# Patient Record
Sex: FEMALE | Race: BLACK OR AFRICAN AMERICAN | Employment: FULL TIME | URBAN - METROPOLITAN AREA
[De-identification: names, ages, dates, MRNs, and addresses within clinical notes are randomized per-mention and may not be internally consistent; named-entity substitution may affect disease eponyms.]

---

## 2017-01-05 ENCOUNTER — OFFICE VISIT (OUTPATIENT)
Dept: FAMILY MEDICINE CLINIC | Age: 32
End: 2017-01-05

## 2017-01-05 VITALS
RESPIRATION RATE: 16 BRPM | HEART RATE: 77 BPM | TEMPERATURE: 98.5 F | HEIGHT: 64 IN | DIASTOLIC BLOOD PRESSURE: 77 MMHG | OXYGEN SATURATION: 100 % | WEIGHT: 142 LBS | SYSTOLIC BLOOD PRESSURE: 115 MMHG | BODY MASS INDEX: 24.24 KG/M2

## 2017-01-05 DIAGNOSIS — J06.9 URI, ACUTE: Primary | ICD-10-CM

## 2017-01-05 RX ORDER — LORATADINE 10 MG/1
10 TABLET ORAL
COMMUNITY
End: 2018-04-27 | Stop reason: SDUPTHER

## 2017-01-05 RX ORDER — FLUTICASONE PROPIONATE 50 MCG
2 SPRAY, SUSPENSION (ML) NASAL DAILY
Qty: 1 BOTTLE | Refills: 0 | Status: SHIPPED | OUTPATIENT
Start: 2017-01-05 | End: 2018-04-27 | Stop reason: ALTCHOICE

## 2017-01-05 RX ORDER — PSEUDOEPHEDRINE HCL 30 MG
30 TABLET ORAL
Qty: 30 TAB | Refills: 0 | Status: SHIPPED | OUTPATIENT
Start: 2017-01-05 | End: 2018-05-21

## 2017-01-05 RX ORDER — AZITHROMYCIN 250 MG/1
TABLET, FILM COATED ORAL
Qty: 6 TAB | Refills: 0 | Status: SHIPPED | OUTPATIENT
Start: 2017-01-05 | End: 2017-01-10

## 2017-01-05 NOTE — MR AVS SNAPSHOT
Visit Information Date & Time Provider Department Dept. Phone Encounter #  
 1/5/2017  9:30 AM Marcelina Herron, Luis Christie Bono [phone] [id number] Follow-up Instructions Return for CPE. Upcoming Health Maintenance Date Due DTaP/Tdap/Td series (1 - Tdap) 9/2/2004 PAP AKA CERVICAL CYTOLOGY 10/18/2014 INFLUENZA AGE 9 TO ADULT 8/1/2016 Allergies as of 1/5/2017  Review Complete On: 1/5/2017 By: Marcelina Herron MD  
 No Known Allergies Current Immunizations  Never Reviewed No immunizations on file. Not reviewed this visit You Were Diagnosed With   
  
 Codes Comments URI, acute    -  Primary ICD-10-CM: J06.9 ICD-9-CM: 465.9 Vitals BP Pulse Temp Resp Height(growth percentile) Weight(growth percentile) 115/77 77 98.5 °F (36.9 °C) (Oral) 16 5' 4\" (1.626 m) 142 lb (64.4 kg) SpO2 BMI OB Status Smoking Status 100% 24.37 kg/m2 Having regular periods Never Smoker BMI and BSA Data Body Mass Index Body Surface Area  
 24.37 kg/m 2 1.71 m 2 Preferred Pharmacy Pharmacy Name Phone [facility] [address] [place] [address] [phone] Your Updated Medication List  
  
   
This list is accurate as of: 1/5/17  9:59 AM.  Always use your most recent med list.  
  
  
  
  
 azithromycin 250 mg tablet Commonly known as:  Kesha Burnett Take 2 tablets today, then take 1 tablet daily CLARITIN 10 mg tablet Generic drug:  loratadine Take 10 mg by mouth. fluticasone 50 mcg/actuation nasal spray Commonly known as:  [place] 2 Sprays by Both Nostrils route daily. pseudoephedrine 30 mg tablet Commonly known as:  SUDAFED Take 1 Tab by mouth every six (6) hours as needed for Congestion. Prescriptions Sent to Pharmacy Refills  
 azithromycin (ZITHROMAX) 250 mg tablet 0 Sig: Take 2 tablets today, then take 1 tablet daily Class: Normal  
 Pharmacy: [address] [place] [address] Ph #: [phone]  
 fluticasone (FLONASE) 50 mcg/actuation nasal spray 0 Si Sprays by Both Nostrils route daily. Class: Normal  
 Pharmacy: [address] [place] [address] Ph #: [phone] Route: Both Nostrils  
 pseudoephedrine (SUDAFED) 30 mg tablet 0 Sig: Take 1 Tab by mouth every six (6) hours as needed for Congestion. Class: Normal  
 Pharmacy: [address] [place] [address] Ph #: [phone] Route: Oral  
  
Follow-up Instructions Return for CPE. Patient Instructions - Mucinex as needed for expectoration - Sudafed as needed for congestion - Zyrtec as needed for congestion 
- rest 
- plenty of fluids 
- follow up if not feeling better in 7 days or if symptoms get worse 
- can start the zpak if you are not feeling better in 7 days or get wors Upper Respiratory Infection (Cold): Care Instructions Your Care Instructions An upper respiratory infection, or URI, is an infection of the nose, sinuses, or throat. URIs are spread by coughs, sneezes, and direct contact. The common cold is the most frequent kind of URI. The flu and sinus infections are other kinds of URIs. Almost all URIs are caused by viruses. Antibiotics won't cure them. But you can treat most infections with home care. This may include drinking lots of fluids and taking over-the-counter pain medicine. You will probably feel better in 4 to 10 days. The doctor has checked you carefully, but problems can develop later. If you notice any problems or new symptoms, get medical treatment right away. Follow-up care is a key part of your treatment and safety. Be sure to make and go to all appointments, and call your doctor if you are having problems. It's also a good idea to know your test results and keep a list of the medicines you take. How can you care for yourself at home? · To prevent dehydration, drink plenty of fluids, enough so that your urine is light yellow or clear like water. Choose water and other caffeine-free clear liquids until you feel better. If you have kidney, heart, or liver disease and have to limit fluids, talk with your doctor before you increase the amount of fluids you drink. · Take an over-the-counter pain medicine, such as acetaminophen (Tylenol), ibuprofen (Advil, Motrin), or naproxen (Aleve). Read and follow all instructions on the label. · Before you use cough and cold medicines, check the label. These medicines may not be safe for young children or for people with certain health problems. · Be careful when taking over-the-counter cold or flu medicines and Tylenol at the same time. Many of these medicines have acetaminophen, which is Tylenol. Read the labels to make sure that you are not taking more than the recommended dose. Too much acetaminophen (Tylenol) can be harmful. · Get plenty of rest. 
· Do not smoke or allow others to smoke around you. If you need help quitting, talk to your doctor about stop-smoking programs and medicines. These can increase your chances of quitting for good. When should you call for help? Call 911 anytime you think you may need emergency care. For example, call if: 
· You have severe trouble breathing. Call your doctor now or seek immediate medical care if: 
· You seem to be getting much sicker. · You have new or worse trouble breathing. · You have a new or higher fever. · You have a new rash. Watch closely for changes in your health, and be sure to contact your doctor if: 
· You have a new symptom, such as a sore throat, an earache, or sinus pain. · You cough more deeply or more often, especially if you notice more mucus or a change in the color of your mucus. · You do not get better as expected. Where can you learn more? Go to http://roland-agueda.info/. Enter T017 in the search box to learn more about \"Upper Respiratory Infection (Cold): Care Instructions. \" Current as of: June 30, 2016 Content Version: 11.1 © 0430-7188 Healthwise, Incorporated. Care instructions adapted under license by [employer] (which disclaims liability or warranty for this information). If you have questions about a medical condition or this instruction, always ask your healthcare professional. [place] [address] any warranty or liability for your use of this information. Introducing [facility]! [facility] introduces Root Orange patient portal. Now you can access parts of your medical record, email your doctor's office, and request medication refills online. 1. In your internet browser, go to [email or web address]. I Just [email or web address] Mind 2. Click on the First Time User? Click Here link in the Sign In box. You will see the New Member Sign Up page. 3. Enter your Root Orange Access Code exactly as it appears below. You will not need to use this code after youve completed the sign-up process. If you do not sign up before the expiration date, you must request a new code. · Root Orange Access Code: [email or web address] Expires: 4/5/2017  9:59 AM 
 
4. Enter the last four digits of your Social Security Number (xxxx) and Date of Birth (mm/dd/yyyy) as indicated and click Submit. You will be taken to the next sign-up page. 5. Create a OmniLyticst ID. This will be your Root Orange login ID and cannot be changed, so think of one that is secure and easy to remember. 6. Create a Root Orange password. You can change your password at any time. 7. Enter your Password Reset Question and Answer. This can be used at a later time if you forget your password. 8. Enter your e-mail address. You will receive e-mail notification when new information is available in [address] 9. Click Sign Up. You can now view and download portions of your medical record. 10. Click the Download Summary menu link to download a portable copy of your medical information. If you have questions, please visit the Frequently Asked Questions section of the [employer] website. Remember, [employer] is NOT to be used for urgent needs. For medical emergencies, dial 911. Now available from your iPhone and Android! Please provide this summary of care documentation to your next provider. Your primary care clinician is listed as Jim Li. If you have any questions after today's visit, please call [phone].

## 2017-01-05 NOTE — LETTER
NOTIFICATION RETURN TO WORK / SCHOOL 
 
1/5/2017 9:55 AM 
 
Ms. Aurelio Baugh 3[facility] [address] [place] [id number] To Whom It May Concern: 
 
Aurelio Baugh is currently under the care of [facility]. She was seen in our office 1/5/17. If there are questions or concerns please have the patient contact our office. Sincerely, Marcelina Herron MD

## 2017-01-05 NOTE — PROGRESS NOTES
Humberto Wright is a 33 y.o. female  Chief Complaint   Patient presents with    Head Pain    Neck Pain    Cold Symptoms     cough and sinus pain starting in early december 1. Have you been to the ER, urgent care clinic since your last visit? Hospitalized since your last visit? No    2. Have you seen or consulted any other health care providers outside of the [address] [place] [address] since your last visit? Include any pap smears or colon screening.   No

## 2017-01-05 NOTE — PATIENT INSTRUCTIONS
- Mucinex as needed for expectoration  - Sudafed as needed for congestion  - Zyrtec as needed for congestion  - rest  - plenty of fluids  - follow up if not feeling better in 7 days or if symptoms get worse  - can start the zpak if you are not feeling better in 7 days or get wors         Upper Respiratory Infection (Cold): Care Instructions  Your Care Instructions    An upper respiratory infection, or URI, is an infection of the nose, sinuses, or throat. URIs are spread by coughs, sneezes, and direct contact. The common cold is the most frequent kind of URI. The flu and sinus infections are other kinds of URIs. Almost all URIs are caused by viruses. Antibiotics won't cure them. But you can treat most infections with home care. This may include drinking lots of fluids and taking over-the-counter pain medicine. You will probably feel better in 4 to 10 days. The doctor has checked you carefully, but problems can develop later. If you notice any problems or new symptoms, get medical treatment right away. Follow-up care is a key part of your treatment and safety. Be sure to make and go to all appointments, and call your doctor if you are having problems. It's also a good idea to know your test results and keep a list of the medicines you take. How can you care for yourself at home? · To prevent dehydration, drink plenty of fluids, enough so that your urine is light yellow or clear like water. Choose water and other caffeine-free clear liquids until you feel better. If you have kidney, heart, or liver disease and have to limit fluids, talk with your doctor before you increase the amount of fluids you drink. · Take an over-the-counter pain medicine, such as acetaminophen (Tylenol), ibuprofen (Advil, Motrin), or naproxen (Aleve). Read and follow all instructions on the label. · Before you use cough and cold medicines, check the label.  These medicines may not be safe for young children or for people with certain health problems. · Be careful when taking over-the-counter cold or flu medicines and Tylenol at the same time. Many of these medicines have acetaminophen, which is Tylenol. Read the labels to make sure that you are not taking more than the recommended dose. Too much acetaminophen (Tylenol) can be harmful. · Get plenty of rest.  · Do not smoke or allow others to smoke around you. If you need help quitting, talk to your doctor about stop-smoking programs and medicines. These can increase your chances of quitting for good. When should you call for help? Call 911 anytime you think you may need emergency care. For example, call if:  · You have severe trouble breathing. Call your doctor now or seek immediate medical care if:  · You seem to be getting much sicker. · You have new or worse trouble breathing. · You have a new or higher fever. · You have a new rash. Watch closely for changes in your health, and be sure to contact your doctor if:  · You have a new symptom, such as a sore throat, an earache, or sinus pain. · You cough more deeply or more often, especially if you notice more mucus or a change in the color of your mucus. · You do not get better as expected. Where can you learn more? Go to http://roland-agueda.info/. Enter U505 in the search box to learn more about \"Upper Respiratory Infection (Cold): Care Instructions. \"  Current as of: June 30, 2016  Content Version: 11.1  © 7192-9518 [employer]. Care instructions adapted under license by [employer] (which disclaims liability or warranty for this information). If you have questions about a medical condition or this instruction, always ask your healthcare professional. [place] [address] any warranty or liability for your use of this information.

## 2017-01-05 NOTE — PROGRESS NOTES
Janet Espinosa is a 33 y.o. female who presents with congestion. History provided by: patient     HPI    Beginning of December started to cough with clear to yellow sputum. Having postnasal drip. Was starting to get better and then starting Monday now feeling malaise, fatigue and body pain including neck pain and headache. Positive ear pain  Occasional itchy water eyes  No sore throat, congestion, fever, chills, SOB, wheezing    No current sick contacts  Non smoker  Positive seasonal allergies  No hx asthma or needing inhaler      There is no problem list on file for this patient. Current Outpatient Prescriptions:     loratadine (CLARITIN) 10 mg tablet, Take 10 mg by mouth., Disp: , Rfl:     azithromycin (ZITHROMAX) 250 mg tablet, Take 2 tablets today, then take 1 tablet daily, Disp: 6 Tab, Rfl: 0    fluticasone (FLONASE) 50 mcg/actuation nasal spray, 2 Sprays by Both Nostrils route daily. , Disp: 1 Bottle, Rfl: 0    pseudoephedrine (SUDAFED) 30 mg tablet, Take 1 Tab by mouth every six (6) hours as needed for Congestion. , Disp: 30 Tab, Rfl: 0     No Known Allergies     History reviewed. No pertinent past medical history. ROS  As stated in HPI    Physical Exam   Constitutional: She is well-developed, well-nourished, and in no distress. /77  Pulse 77  Temp 98.5 °F (36.9 °C) (Oral)   Resp 16  Ht 5' 4\" (1.626 m)  Wt 142 lb (64.4 kg)  SpO2 100%  BMI 24.37 kg/m2    HENT:   Head: Normocephalic and atraumatic. Right Ear: External ear normal.   Left Ear: External ear normal.   Mouth/Throat: Oropharynx is clear and moist. No oropharyngeal exudate. Bilateral TM with good light reflex  Positive bilateral maxillary sinus tenderness  Positive nasal congestion   Eyes: Conjunctivae are normal. Pupils are equal, round, and reactive to light. Right eye exhibits no discharge. Left eye exhibits no discharge. No scleral icterus. Neck: Normal range of motion. Neck supple.    Cardiovascular: Normal rate, regular rhythm, normal heart sounds and intact distal pulses. Exam reveals no gallop and no friction rub. No murmur heard. Pulmonary/Chest: Effort normal and breath sounds normal. No respiratory distress. She has no wheezes. She has no rales. Lymphadenopathy:     She has no cervical adenopathy. Vitals reviewed. Negative Brudzinski sign and Kerning sign    Assessment/Plan:   Sandie Flores is a 33 y.o. female who presents with congestion. ICD-10-CM ICD-9-CM    1. URI, acute J06.9 465.9 azithromycin (ZITHROMAX) 250 mg tablet      fluticasone (FLONASE) 50 mcg/actuation nasal spray      pseudoephedrine (SUDAFED) 30 mg tablet     1. URI, acute  Appears that patient had acute new symptoms onset. Symptoms flu like. Outside the window for antiviral. Give symptomatic treatment. - azithromycin (ZITHROMAX) 250 mg tablet; Take 2 tablets today, then take 1 tablet daily  Dispense: 6 Tab; Refill: 0 if not better in 7 days  - fluticasone (FLONASE) 50 mcg/actuation nasal spray; 2 Sprays by Both Nostrils route daily. Dispense: 1 Bottle; Refill: 0  - pseudoephedrine (SUDAFED) 30 mg tablet; Take 1 Tab by mouth every six (6) hours as needed for Congestion. Dispense: 30 Tab; Refill: 0  - continue claritin  - tylenol or ibuprofen prn    Follow-up Disposition:  Return for CPE. I have discussed the diagnosis with the patient and the intended plan as seen in the above orders. The patient has received an after-visit summary and questions were answered concerning future plans. I have discussed medication side effects and warnings with the patient as well.     Scarlet Foreman MD  Family Medicine Resident (PGY-3)  1/5/2017

## 2017-01-06 ENCOUNTER — TELEPHONE (OUTPATIENT)
Dept: FAMILY MEDICINE CLINIC | Age: 32
End: 2017-01-06

## 2017-01-06 NOTE — TELEPHONE ENCOUNTER
----- Message from Ap Matos sent at 1/6/2017 10:56 AM EST -----  Regarding: /Telephone    Pt was out of work on yesterday and seen at the office, barrera a doctor's note stating returned date  Faxed to [phone] . Please call the pt to advise.  [phone]

## 2017-01-06 NOTE — LETTER
NOTIFICATION RETURN TO WORK / SCHOOL 
 
1/6/2017 11:53 AM 
 
Ms. Sandie Flores [facility] [address] [place] [address] [address] [id number] To Whom It May Concern: 
 
Sandie Flores is currently under the care of [facility]. She was seen in our office 1/5/17 and will return to work/school on: 1/7/17 If there are questions or concerns please have the patient contact our office. Sincerely, Scarlet Foreman MD

## 2017-01-13 ENCOUNTER — TELEPHONE (OUTPATIENT)
Dept: FAMILY MEDICINE | Age: 32
End: 2017-01-13

## 2017-02-14 ENCOUNTER — OFFICE VISIT (OUTPATIENT)
Dept: CHIROPRACTIC MEDICINE | Age: 32
End: 2017-02-14

## 2017-02-14 VITALS
OXYGEN SATURATION: 99 % | WEIGHT: 144.2 LBS | BODY MASS INDEX: 24.75 KG/M2 | HEART RATE: 70 BPM | SYSTOLIC BLOOD PRESSURE: 90 MMHG | TEMPERATURE: 97 F | DIASTOLIC BLOOD PRESSURE: 60 MMHG | RESPIRATION RATE: 24 BRPM

## 2017-02-14 DIAGNOSIS — M99.04 SOMATIC DYSFUNCTION OF SACRAL REGION: Primary | ICD-10-CM

## 2017-02-14 PROCEDURE — 99213 OFFICE O/P EST LOW 20 MIN: CPT | Performed by: FAMILY MEDICINE

## 2017-02-14 PROCEDURE — 98925 OSTEOPATH MANJ 1-2 REGIONS: CPT | Performed by: FAMILY MEDICINE

## 2017-03-08 ENCOUNTER — TELEPHONE (OUTPATIENT)
Dept: FAMILY MEDICINE | Age: 32
End: 2017-03-08

## 2017-03-08 DIAGNOSIS — L70.0 ACNE VULGARIS: Primary | Chronic | ICD-10-CM

## 2017-03-08 RX ORDER — MINOCYCLINE HYDROCHLORIDE 65 MG/1
1 TABLET, FILM COATED, EXTENDED RELEASE ORAL DAILY
Qty: 90 TABLET | Refills: 1 | Status: SHIPPED | OUTPATIENT
Start: 2017-03-08 | End: 2017-07-27 | Stop reason: CLARIF

## 2017-05-15 ENCOUNTER — OFFICE VISIT (OUTPATIENT)
Dept: FAMILY MEDICINE | Age: 32
End: 2017-05-15

## 2017-05-15 VITALS
OXYGEN SATURATION: 94 % | RESPIRATION RATE: 16 BRPM | BODY MASS INDEX: 24.79 KG/M2 | TEMPERATURE: 97.4 F | DIASTOLIC BLOOD PRESSURE: 60 MMHG | HEART RATE: 74 BPM | SYSTOLIC BLOOD PRESSURE: 84 MMHG | WEIGHT: 144.4 LBS

## 2017-05-15 DIAGNOSIS — K62.5 RECTAL BLEEDING: Primary | ICD-10-CM

## 2017-05-15 PROCEDURE — 99213 OFFICE O/P EST LOW 20 MIN: CPT | Performed by: FAMILY MEDICINE

## 2017-05-22 ENCOUNTER — TELEPHONE (OUTPATIENT)
Dept: FAMILY MEDICINE | Age: 32
End: 2017-05-22

## 2017-05-22 ENCOUNTER — NURSE ONLY (OUTPATIENT)
Dept: FAMILY MEDICINE | Age: 32
End: 2017-05-22

## 2017-05-22 DIAGNOSIS — Z11.1 SCREENING FOR TUBERCULOSIS: Primary | ICD-10-CM

## 2017-05-22 PROCEDURE — 86580 TB INTRADERMAL TEST: CPT | Performed by: FAMILY MEDICINE

## 2017-05-25 ENCOUNTER — TELEPHONE (OUTPATIENT)
Dept: FAMILY MEDICINE | Age: 32
End: 2017-05-25

## 2017-05-25 ENCOUNTER — NURSE ONLY (OUTPATIENT)
Dept: FAMILY MEDICINE | Age: 32
End: 2017-05-25

## 2017-05-25 LAB — INDURATION: 0 MM (ref 0–?)

## 2017-06-16 ENCOUNTER — TELEPHONE (OUTPATIENT)
Dept: FAMILY MEDICINE | Age: 32
End: 2017-06-16

## 2017-06-19 ENCOUNTER — TELEPHONE (OUTPATIENT)
Dept: FAMILY MEDICINE | Age: 32
End: 2017-06-19

## 2017-07-03 ENCOUNTER — OFFICE VISIT (OUTPATIENT)
Dept: FAMILY MEDICINE CLINIC | Age: 32
End: 2017-07-03

## 2017-07-03 VITALS
HEART RATE: 67 BPM | TEMPERATURE: 98.4 F | HEIGHT: 64 IN | DIASTOLIC BLOOD PRESSURE: 73 MMHG | BODY MASS INDEX: 24.07 KG/M2 | SYSTOLIC BLOOD PRESSURE: 106 MMHG | OXYGEN SATURATION: 100 % | RESPIRATION RATE: 17 BRPM | WEIGHT: 141 LBS

## 2017-07-03 DIAGNOSIS — Z01.419 WELL WOMAN EXAM WITH ROUTINE GYNECOLOGICAL EXAM: ICD-10-CM

## 2017-07-03 DIAGNOSIS — N92.6 MISSED MENSES: Primary | ICD-10-CM

## 2017-07-03 LAB
HCG URINE, QL. (POC): POSITIVE
VALID INTERNAL CONTROL?: YES

## 2017-07-03 NOTE — PROGRESS NOTES
HPI:  Shawn Win is a 33 y.o. female presenting for well woman exam and possible pregnancy. No acute problems today. Diet: She follows regular diet and tries to eat healthy. Drinks plenty of fluids. Exercise: Just walking   Tobacco: None  Alcohol: None  Drugs: None    Health Maintenance:  Pap smear: Last , normal  Mammogram: Never done before   HIV testing: Never done  Hepatitis C testing: Never done  Dental screening: Seeing the dentist regularly    Immunizations: There is no immunization history on file for this patient. Flu:Not done for 5586-6423 season  Tdap: More than 10 years ago    Menstrual, Pregnancy, and Sexual Histories:  Age at which menses began: 13  Last menstrual period was May, 28, 2017  Length of periods: 4-5 days  Number of days between periods: 28-30 days  Menstrual flow: Normal        Sexually active?: yes  Number of sexual partners: 1  Type of sexual partners: Male  Method of family planning: None    Has 6year old daughter who was born 6 years ago at [facility]. No complications during the pregnancy,  full term w/o complications. No Known Allergies      Current Outpatient Prescriptions   Medication Sig    prenatal vit-calcium-iron-fa (PRENATAL PLUS WITH CALCIUM) 27 mg iron- 1 mg tab Take 1 Tab by mouth daily.  loratadine (CLARITIN) 10 mg tablet Take 10 mg by mouth.  fluticasone (FLONASE) 50 mcg/actuation nasal spray 2 Sprays by Both Nostrils route daily.  pseudoephedrine (SUDAFED) 30 mg tablet Take 1 Tab by mouth every six (6) hours as needed for Congestion. No current facility-administered medications for this visit. History reviewed. No pertinent past medical history. History reviewed. No pertinent surgical history.       Family History   Problem Relation Age of Onset    Cancer Mother      breast    Cancer Father      prostate         Social History     Social History    Marital status: UNKNOWN     Spouse name: N/A    Number of children: N/A    Years of education: N/A     Occupational History    Not on file. Social History Main Topics    Smoking status: Never Smoker    Smokeless tobacco: Never Used    Alcohol use No    Drug use: No    Sexual activity: Not Currently     Partners: Male     Other Topics Concern    Not on file     Social History Narrative         Review of Systems: (Positive for items in bold)  Constitutional: fevers, chills, fatigue, weakness, weight loss, weight gain  Eyes: blurry vision, decreased vision, loss of vision, eye pain, diplopia, photophobia, discharge  ENT: sore throat, nasal congestion, nasal discharge, epistaxis, tinnitus, hearing loss  Cardiovascular: chest pain, dyspnea on exertion, orthopnea, paroxysmal nocturnal dyspnea, edema, palpitations   Respiratory: cough, hemoptysis, shortness of breath, pleuritic chest pain, wheezing   GI: abdominal pain, flank pain, nausea, vomiting, diarrhea, constipation, black stool, blood in stool  : dysuria, frequency/urgency, hematuria, genital discharge, vaginal bleeding. Missed menses.   Endocrine: polydipsia, polyuria, palpitations, skin changes, temperature intolerances, unexpected weight change   Neurological: dizzy/vertigo, headache, focal weakness, numbness/tingling, speech problems, loss of consciousness, confusion, memory loss  Musculoskeletal: back pain, joint pain, joint stiffness, joint swelling, muscle pain, muscle weakness  Skin: rash, itching, hives  Breast: no masses or nipple discharge  Psychiatric: anxiety, depression, physical abuse      Physical Exam  Visit Vitals    /73 (BP 1 Location: Left arm, BP Patient Position: Sitting)    Pulse 67    Temp 98.4 °F (36.9 °C) (Oral)    Resp 17    Ht 5' 4\" (1.626 m)    Wt 141 lb (64 kg)    LMP 05/28/2017    SpO2 100%    BMI 24.2 kg/m2       General appearance - alert, well appearing, and in no distress  HEENT - normocephalic, pupils equal and reactive, extraocular eye movements intact, hearing grossly normal bilaterally, bilateral TM's and external ear canals normal, nose normal and patent with no erythema, mucous membranes moist, pharynx normal without lesions  Neck - supple, no significant adenopathy  Cardiac - normal rate and regular rhythm, no m/r/g  Respiratory - clear to auscultation, no wheezes, rales or rhonchi, symmetric air entry  Abdomen - soft, nontender, nondistended, no masses or organomegaly  Neurologic - alert, oriented, normal speech, no focal findings or movement disorder noted, cranial nerves II through XII intact  Musculoskeletal - no joint tenderness, deformity or swelling  Extremities - peripheral pulses normal, no pedal edema, no clubbing or cyanosis  Skin - normal coloration and turgor, no rashes, no suspicious skin lesions noted  Pelvic - Exam chaperoned by Louisa Ramos LPN. External genitalia normal without rashes or lesions. Pink and moist vaginal mucosa. No discharge. Cervix without lesions or abnormal discharge. Uterus non tender and normal size. No adnexal masses or tenderness appreciated. Breast - Exam chaperoned by Louisa Ramos LPN. Non tender. No masses. No nipple discharge. POC UPT positive. Assessment/Plan:    1. Missed menses. Discussed with the patient healthy life style during the pregnancy. Precausions were given, safe exercise discussed.  -The labwork as below  -Will start prenatal vitamins  -The appointment is made for the dating [address] on 7/11/2017  And for initial prenatal visit with me on 7/21/2017.    2. Well woman exam. No concerns about health maintenance. I have discussed the diagnosis with the patient and the intended plan as seen in the above orders. The patient has received an after-visit summary and questions were answered concerning future plans. I have discussed medication side effects and warnings with the patient as well. Informed pt to return to the office if new symptoms arise.     Patient was discussed  with Dr. Reyna Alford, Attending Physician. Flavio Alston MD  Family Medicine Resident, PGY-2        Encounter Diagnoses:    ICD-10-CM ICD-9-CM    1. Missed menses N92.6 626.4 AMB POC URINE PREGNANCY TEST, VISUAL COLOR COMPARISON      CBC WITH AUTOMATED DIFF      METABOLIC PANEL, COMPREHENSIVE      TOTAL HCG, QT.      ABO GROUPING AND RHO(D) TYPING      HEP B SURFACE AG      HIV 1/2 AG/AB, 4TH GENERATION,W RFLX CONFIRM      VZV AB, IGG      T PALLIDUM AB      RUBELLA AB, IGG      CULTURE, URINE      prenatal vit-calcium-iron-fa (PRENATAL PLUS WITH CALCIUM) 27 mg iron- 1 mg tab      CHLAMYDIA / GC-AMPLIFIED      CANCELED: CHLAMYDIA/GC AMPLIFICATON THINPREP      CANCELED: CHLAMYDIA/GC PCR   2.  Well woman exam with routine gynecological exam Z01.419 V72.31 PAP IG, APTIMA HPV AND RFX 16/18,45 ([id number])

## 2017-07-03 NOTE — PATIENT INSTRUCTIONS
Learning About Pregnancy  Your Care Instructions  Your health in the early weeks of your pregnancy is particularly important for your babys health. Take good care of yourself. Anything you do that harms your body can also harm your baby. Make sure to go to all of your doctor appointments. Regular checkups will help keep you and your baby healthy. Follow-up care is a key part of your treatment and safety. Be sure to make and go to all appointments, and call your doctor if you are having problems. Its also a good idea to know your test results and keep a list of the medicines you take. How can you care for yourself at home? Diet  · Eat a balanced diet. Make sure your diet includes plenty of beans, peas, and leafy green vegetables. · Do not skip meals or go for many hours without eating. If you are nauseated, try to eat a small, healthy snack every 2 to 3 hours. · Do not eat fish that has a high level of mercury, such as shark, swordfish, or mackerel. Do not eat more than one can of tuna each week. · Drink plenty of fluids, enough so that your urine is light yellow or clear like water. If you have kidney, heart, or liver disease and have to limit fluids, talk with your doctor before you increase the amount of fluids you drink. · Cut down on caffeine, such as coffee, tea, and cola. · Do not drink alcohol, such as beer, wine, or hard liquor. · Take a multivitamin that contains at least 400 micrograms (mcg) of folic acid to help prevent birth defects. Fortified cereal and whole wheat bread are good additional sources of folic acid. · Increase the calcium in your diet. Try to drink a quart of skim milk each day. You may also take calcium supplements and choose foods such as cheese and yogurt. Lifestyle  · Make sure you go to your follow-up appointments. · Get plenty of rest. You may be unusually tired while you are pregnant. · Get at least 30 minutes of exercise on most days of the week.  Walking is a good choice. If you have not exercised in the past, start out slowly. Take several short walks each day. · Do not smoke. If you need help quitting, talk to your doctor about stop-smoking programs. These can increase your chances of quitting for good. · Do not touch cat feces or litter boxes. Also, wash your hands after you handle raw meat, and fully cook all meat before you eat it. Wear gloves when you work in the yard or garden, and wash your hands well when you are done. Cat feces, raw or undercooked meat, and contaminated dirt can cause an infection that may harm your baby or lead to a miscarriage. · Do not use saunas or hot tubs. Raising your body temperature may harm your baby. · Avoid chemical fumes, paint fumes, or poisons. · Do not use illegal drugs or alcohol. Medicines  · Review all of your medicines with your doctor. Some of your routine medicines may need to be changed to protect your baby. · Use acetaminophen (Tylenol) to relieve minor problems, such as a mild headache or backache or a mild fever with cold symptoms. Do not use nonsteroidal anti-inflammatory drugs (NSAIDs), such as ibuprofen (Advil, Motrin) or naproxen (Aleve), unless your doctor says it is okay. · Do not take two or more pain medicines at the same time unless the doctor told you to. Many pain medicines have acetaminophen, which is Tylenol. Too much acetaminophen (Tylenol) can be harmful. · Take your medicines exactly as prescribed. Call your doctor if you think you are having a problem with your medicine. To manage morning sickness  · If you feel sick when you first wake up, try eating a small snack (such as crackers) before you get out of bed. Allow some time to digest the snack, and then get out of bed slowly. · Do not skip meals or go for long periods without eating. An empty stomach can make nausea worse. · Eat small, frequent meals instead of three large meals each day. · Drink plenty of fluids.  Sports drinks, such as Gatorade or Powerade, are good choices. · Eat foods that are high in protein but low in fat. · If you are taking iron supplements, ask your doctor if they are necessary. Iron can make nausea worse. · Avoid any smells, such as coffee, that make you feel sick. · Get lots of rest. Morning sickness may be worse when you are tired. Where can you learn more? Go to http://roland-agueda.info/. Enter S693 in the search box to learn more about \"Learning About Pregnancy. \"  Current as of: March 16, 2017  Content Version: 11.3  © 1109-4128 [employer]. Care instructions adapted under license by [employer] (which disclaims liability or warranty for this information). If you have questions about a medical condition or this instruction, always ask your healthcare professional. [address] any warranty or liability for your use of this information. Exercise During Pregnancy: Care Instructions  Your Care Instructions  Exercise is good for healthy pregnant women. It can relieve back pain, swelling, and other discomforts of pregnancy. It also prepares your muscles for childbirth. And exercise can improve your energy level and help you sleep better. If your doctor recommends it, get more exercise. Walking is a good choice. Bit by bit, increase the amount you walk every day. Try for at least 30 minutes on most days of the week. But if you do not already exercise, be sure to talk with your doctor before you start a new exercise program. Try exercise classes for pregnant women. Doctors do not recommend contact sports during pregnancy. Follow-up care is a key part of your treatment and safety. Be sure to make and go to all appointments, and call your doctor if you are having problems. It's also a good idea to know your test results and keep a list of the medicines you take. How can you care for yourself at home?   · Talk with your doctor about the right kind of exercise for each stage of pregnancy. · Listen to your body to know if your exercise is at a safe level. ¨ Do not become overheated while you exercise. ¨ If you feel tired, take it easy. You might walk instead of run. ¨ If you are used to strenuous exercise, pay attention to changes in your body that mean it is time to slow down. ¨ High body temperature can be harmful to your baby. So if you want to use a sauna or hot tub, be sure to talk to your doctor about how to use them safely. · If you exercised before getting pregnant, you should be able to keep up your routine early in your pregnancy. That might include running and aerobics. Later, you may want to switch to swimming or walking. · Eat a small snack or drink juice 15 to 30 minutes before you exercise. · Eat a healthy diet. Make sure it includes plenty of beans, peas, and leafy green vegetables. You may need to increase how much you eat to get extra energy for exercise. · Drink plenty of fluids before, during, and after exercise. · Avoid contact sports, such as soccer and basketball. Also avoid scuba diving, exercise in high altitude (above 6,000 feet), and horseback riding. · Do not get overtired while you exercise. You should be able to talk while you work out. · After your fourth month of pregnancy, avoid exercises (such as sit-ups and some yoga poses) that require you to lie flat on your back on a hard surface. · Try swimming and brisk walking during all your pregnancy. · Get plenty of rest. You may be very tired while you are pregnant. Where can you learn more? Go to http://roland-agueda.info/. Enter X808 in the search box to learn more about \"Exercise During Pregnancy: Care Instructions. \"  Current as of: March 16, 2017  Content Version: 11.3  © 6055-5066 [employer]. Care instructions adapted under license by [employer] (which disclaims liability or warranty for this information).  If you have questions about a medical condition or this instruction, always ask your healthcare professional. [place] [address] any warranty or liability for your use of this information.

## 2017-07-03 NOTE — MR AVS SNAPSHOT
Visit Information Date & Time Provider Department Dept. Phone Encounter #  
 7/3/2017  4:00 PM Fernando Berger MD [facility] [phone] [id number] Your Appointments 7/11/2017  4:00 PM  
PROCEDURE with Werner Brown MD  
[address]) Appt Note: dating ultrasound [address] [facility]  
[phone]  
  
   
 [address] [id number]  
  
    
 7/21/2017 11:05 AM  
NEW OB Patient with Fernando Berger MD  
[address]) Appt Note: New Ob  
 [address] [facility] [facility]  
[phone]  
  
   
 [address] [facility] [phone] Upcoming Health Maintenance Date Due DTaP/Tdap/Td series (1 - Tdap) 9/2/2004 PAP AKA CERVICAL CYTOLOGY 10/18/2014 INFLUENZA AGE 9 TO ADULT 8/1/2017 Allergies as of 7/3/2017  Review Complete On: 7/3/2017 By: Kindra Segura LPN No Known Allergies Current Immunizations  Never Reviewed No immunizations on file. Not reviewed this visit You Were Diagnosed With   
  
 Codes Comments Missed menses    -  Primary ICD-10-CM: N92.6 ICD-9-CM: 626.4 Vitals BP Pulse Temp Resp Height(growth percentile) Weight(growth percentile) 106/73 (BP 1 Location: Left arm, BP Patient Position: Sitting) 67 98.4 °F (36.9 °C) (Oral) 17 5' 4\" (1.626 m) 141 lb (64 kg) LMP SpO2 BMI OB Status Smoking Status 05/28/2017 100% 24.2 kg/m2 Pregnant Never Smoker Vitals History BMI and BSA Data Body Mass Index Body Surface Area  
 24.2 kg/m 2 1.7 m 2 Preferred Pharmacy Pharmacy Name Phone [facility] [address] [place] [address] [phone] Your Updated Medication List  
  
   
This list is accurate as of: 7/3/17  5:17 PM.  Always use your most recent med list.  
  
  
  
  
 CLARITIN 10 mg tablet Generic drug:  loratadine Take 10 mg by mouth. fluticasone 50 mcg/actuation nasal spray Commonly known as:  Marcine Infield 2 Sprays by Both Nostrils route daily. prenatal vit-calcium-iron-fa 27 mg iron- 1 mg Tab Commonly known as:  PRENATAL PLUS with CALCIUM Take 1 Tab by mouth daily. pseudoephedrine 30 mg tablet Commonly known as:  SUDAFED Take 1 Tab by mouth every six (6) hours as needed for Congestion. Prescriptions Printed Refills  
 prenatal vit-calcium-iron-fa (PRENATAL PLUS WITH CALCIUM) 27 mg iron- 1 mg tab 11 Sig: Take 1 Tab by mouth daily. Class: Print Route: Oral  
  
We Performed the Following ABO GROUPING AND RHO(D) TYPING [id number] CPT(R)] AMB POC URINE PREGNANCY TEST, VISUAL COLOR COMPARISON [05608 CPT(R)] CBC WITH AUTOMATED DIFF [30811 CPT(R)] CHLAMYDIA/GC PCR [00629 CPT(R)] CULTURE, URINE [id number] CPT(R)] HEP B SURFACE AG [id number] CPT(R)] HIV 1/2 AG/AB, 4TH GENERATION,W RFLX CONFIRM [[id number] Custom] METABOLIC PANEL, COMPREHENSIVE [51822 CPT(R)] PAP IG, APTIMA HPV AND RFX 16/18,45 (229623) [[id number] Custom] RUBELLA AB, IGG [id number] CPT(R)] T PALLIDUM AB [[id number] Custom] TOTAL HCG, QT. [31792 CPT(R)] VZV AB, IGG [id number] CPT(R)] Patient Instructions Learning About Pregnancy Your Care Instructions Your health in the early weeks of your pregnancy is particularly important for your babys health. Take good care of yourself. Anything you do that harms your body can also harm your baby. Make sure to go to all of your doctor appointments. Regular checkups will help keep you and your baby healthy. Follow-up care is a key part of your treatment and safety. Be sure to make and go to all appointments, and call your doctor if you are having problems. Its also a good idea to know your test results and keep a list of the medicines you take. How can you care for yourself at home? Diet · Eat a balanced diet. Make sure your diet includes plenty of beans, peas, and leafy green vegetables. · Do not skip meals or go for many hours without eating. If you are nauseated, try to eat a small, healthy snack every 2 to 3 hours. · Do not eat fish that has a high level of mercury, such as shark, swordfish, or mackerel. Do not eat more than one can of tuna each week. · Drink plenty of fluids, enough so that your urine is light yellow or clear like water. If you have kidney, heart, or liver disease and have to limit fluids, talk with your doctor before you increase the amount of fluids you drink. · Cut down on caffeine, such as coffee, tea, and cola. · Do not drink alcohol, such as beer, wine, or hard liquor. · Take a multivitamin that contains at least 400 micrograms (mcg) of folic acid to help prevent birth defects. Fortified cereal and whole wheat bread are good additional sources of folic acid. · Increase the calcium in your diet. Try to drink a quart of skim milk each day. You may also take calcium supplements and choose foods such as cheese and yogurt. Lifestyle · Make sure you go to your follow-up appointments. · Get plenty of rest. You may be unusually tired while you are pregnant. · Get at least 30 minutes of exercise on most days of the week. Walking is a good choice. If you have not exercised in the past, start out slowly. Take several short walks each day. · Do not smoke. If you need help quitting, talk to your doctor about stop-smoking programs. These can increase your chances of quitting for good. · Do not touch cat feces or litter boxes. Also, wash your hands after you handle raw meat, and fully cook all meat before you eat it. Wear gloves when you work in the yard or garden, and wash your hands well when you are done. Cat feces, raw or undercooked meat, and contaminated dirt can cause an infection that may harm your baby or lead to a miscarriage. · Do not use saunas or hot tubs. Raising your body temperature may harm your baby. · Avoid chemical fumes, paint fumes, or poisons. · Do not use illegal drugs or alcohol. Medicines · Review all of your medicines with your doctor. Some of your routine medicines may need to be changed to protect your baby. · Use acetaminophen (Tylenol) to relieve minor problems, such as a mild headache or backache or a mild fever with cold symptoms. Do not use nonsteroidal anti-inflammatory drugs (NSAIDs), such as ibuprofen (Advil, Motrin) or naproxen (Aleve), unless your doctor says it is okay. · Do not take two or more pain medicines at the same time unless the doctor told you to. Many pain medicines have acetaminophen, which is Tylenol. Too much acetaminophen (Tylenol) can be harmful. · Take your medicines exactly as prescribed. Call your doctor if you think you are having a problem with your medicine. To manage morning sickness · If you feel sick when you first wake up, try eating a small snack (such as crackers) before you get out of bed. Allow some time to digest the snack, and then get out of bed slowly. · Do not skip meals or go for long periods without eating. An empty stomach can make nausea worse. · Eat small, frequent meals instead of three large meals each day. · Drink plenty of fluids. Sports drinks, such as Gatorade or Powerade, are good choices. · Eat foods that are high in protein but low in fat. · If you are taking iron supplements, ask your doctor if they are necessary. Iron can make nausea worse. · Avoid any smells, such as coffee, that make you feel sick. · Get lots of rest. Morning sickness may be worse when you are tired. Where can you learn more? Go to http://roland-agueda.info/. Enter S068 in the search box to learn more about \"Learning About Pregnancy. \" Current as of: March 16, 2017 Content Version: 11.3 © 5131-5689 [employer]. Care instructions adapted under license by [employer] (which disclaims liability or warranty for this information). If you have questions about a medical condition or this instruction, always ask your healthcare professional. [address] any warranty or liability for your use of this information. Exercise During Pregnancy: Care Instructions Your Care Instructions Exercise is good for healthy pregnant women. It can relieve back pain, swelling, and other discomforts of pregnancy. It also prepares your muscles for childbirth. And exercise can improve your energy level and help you sleep better. If your doctor recommends it, get more exercise. Walking is a good choice. Bit by bit, increase the amount you walk every day. Try for at least 30 minutes on most days of the week. But if you do not already exercise, be sure to talk with your doctor before you start a new exercise program. Try exercise classes for pregnant women. Doctors do not recommend contact sports during pregnancy. Follow-up care is a key part of your treatment and safety. Be sure to make and go to all appointments, and call your doctor if you are having problems. It's also a good idea to know your test results and keep a list of the medicines you take. How can you care for yourself at home? · Talk with your doctor about the right kind of exercise for each stage of pregnancy. · Listen to your body to know if your exercise is at a safe level. ¨ Do not become overheated while you exercise. ¨ If you feel tired, take it easy. You might walk instead of run. ¨ If you are used to strenuous exercise, pay attention to changes in your body that mean it is time to slow down. ¨ High body temperature can be harmful to your baby. So if you want to use a sauna or hot tub, be sure to talk to your doctor about how to use them safely. · If you exercised before getting pregnant, you should be able to keep up your routine early in your pregnancy. That might include running and aerobics. Later, you may want to switch to swimming or walking. · Eat a small snack or drink juice 15 to 30 minutes before you exercise. · Eat a healthy diet. Make sure it includes plenty of beans, peas, and leafy green vegetables. You may need to increase how much you eat to get extra energy for exercise. · Drink plenty of fluids before, during, and after exercise. · Avoid contact sports, such as soccer and basketball. Also avoid scuba diving, exercise in high altitude (above 6,000 feet), and horseback riding. · Do not get overtired while you exercise. You should be able to talk while you work out. · After your fourth month of pregnancy, avoid exercises (such as sit-ups and some yoga poses) that require you to lie flat on your back on a hard surface. · Try swimming and brisk walking during all your pregnancy. · Get plenty of rest. You may be very tired while you are pregnant. Where can you learn more? Go to http://rolandSenseLogixagueda.info/. Enter W130 in the search box to learn more about \"Exercise During Pregnancy: Care Instructions. \" Current as of: March 16, 2017 Content Version: 11.3 © 9697-4411 [employer]. Care instructions adapted under license by [employer] (which disclaims liability or warranty for this information). If you have questions about a medical condition or this instruction, always ask your healthcare professional. [place] [address] any warranty or liability for your use of this information. Introducing [facility]! Avelina Almanzar introduces [employer] patient portal. Now you can access parts of your medical record, email your doctor's office, and request medication refills online. 1. In your internet browser, go to https://[facility]. [employer]/[facility] 2. Click on the First Time User? Click Here link in the Sign In box. You will see the New Member Sign Up page. 3. Enter your [employer] Access Code exactly as it appears below. You will not need to use this code after youve completed the sign-up process. If you do not sign up before the expiration date, you must request a new code. · [employer] Access Code: [email or web address] Expires: 10/1/2017  4:02 PM 
 
4. Enter the last four digits of your Social Security Number (xxxx) and Date of Birth (mm/dd/yyyy) as indicated and click Submit. You will be taken to the next sign-up page. 5. Create a [employer] ID. This will be your [employer] login ID and cannot be changed, so think of one that is secure and easy to remember. 6. Create a [employer] password. You can change your password at any time. 7. Enter your Password Reset Question and Answer. This can be used at a later time if you forget your password. 8. Enter your e-mail address. You will receive e-mail notification when new information is available in [address] 9. Click Sign Up. You can now view and download portions of your medical record. 10. Click the Download Summary menu link to download a portable copy of your medical information. If you have questions, please visit the Frequently Asked Questions section of the [employer] website. Remember, [employer] is NOT to be used for urgent needs. For medical emergencies, dial 911. Now available from your iPhone and Android! Please provide this summary of care documentation to your next provider. Your primary care clinician is listed as Jorden Andrews. If you have any questions after today's visit, please call [phone].

## 2017-07-04 LAB — BACTERIA UR CULT: NO GROWTH

## 2017-07-05 LAB
C TRACH RRNA SPEC QL NAA+PROBE: NEGATIVE
N GONORRHOEA RRNA SPEC QL NAA+PROBE: NEGATIVE

## 2017-07-05 NOTE — PROGRESS NOTES
Chlamydia and Gonorrhea negative, urine culture with no growth. Will discuss during the next prenatal visit.

## 2017-07-06 LAB
ABO GROUP BLD DONR: NORMAL
ALBUMIN SERPL-MCNC: 4.2 G/DL (ref 3.5–5.5)
ALBUMIN/GLOB SERPL: 1.6 {RATIO} (ref 1.2–2.2)
ALP SERPL-CCNC: 45 IU/L (ref 39–117)
ALT SERPL-CCNC: 12 IU/L (ref 0–32)
AST SERPL-CCNC: 13 IU/L (ref 0–40)
BASOPHILS # BLD AUTO: 0 X10E3/UL (ref 0–0.2)
BASOPHILS NFR BLD AUTO: 0 %
BILIRUB SERPL-MCNC: 0.2 MG/DL (ref 0–1.2)
BUN SERPL-MCNC: 6 MG/DL (ref 6–20)
BUN/CREAT SERPL: 9 (ref 9–23)
CALCIUM SERPL-MCNC: 9.2 MG/DL (ref 8.7–10.2)
CHLORIDE SERPL-SCNC: 102 MMOL/L (ref 96–106)
CO2 SERPL-SCNC: 22 MMOL/L (ref 18–29)
CREAT SERPL-MCNC: 0.7 MG/DL (ref 0.57–1)
EOSINOPHIL # BLD AUTO: 0.1 X10E3/UL (ref 0–0.4)
EOSINOPHIL NFR BLD AUTO: 1 %
ERYTHROCYTE [DISTWIDTH] IN BLOOD BY AUTOMATED COUNT: 14.3 % (ref 12.3–15.4)
GLOBULIN SER CALC-MCNC: 2.7 G/DL (ref 1.5–4.5)
GLUCOSE SERPL-MCNC: 84 MG/DL (ref 65–99)
HBV SURFACE AG SERPL QL IA: NEGATIVE
HCG INTACT+B SERPL-ACNC: 2320 MIU/ML
HCT VFR BLD AUTO: 35.8 % (ref 34–46.6)
HGB BLD-MCNC: 11.3 G/DL (ref 11.1–15.9)
HIV 1+2 AB+HIV1 P24 AG SERPL QL IA: NON REACTIVE
IMM GRANULOCYTES # BLD: 0 X10E3/UL (ref 0–0.1)
IMM GRANULOCYTES NFR BLD: 0 %
LYMPHOCYTES # BLD AUTO: 2 X10E3/UL (ref 0.7–3.1)
LYMPHOCYTES NFR BLD AUTO: 26 %
MCH RBC QN AUTO: 25 PG (ref 26.6–33)
MCHC RBC AUTO-ENTMCNC: 31.6 G/DL (ref 31.5–35.7)
MCV RBC AUTO: 79 FL (ref 79–97)
MONOCYTES # BLD AUTO: 0.5 X10E3/UL (ref 0.1–0.9)
MONOCYTES NFR BLD AUTO: 7 %
NEUTROPHILS # BLD AUTO: 5.1 X10E3/UL (ref 1.4–7)
NEUTROPHILS NFR BLD AUTO: 66 %
PLATELET # BLD AUTO: 252 X10E3/UL (ref 150–379)
POTASSIUM SERPL-SCNC: 3.6 MMOL/L (ref 3.5–5.2)
PROT SERPL-MCNC: 6.9 G/DL (ref 6–8.5)
RBC # BLD AUTO: 4.52 X10E6/UL (ref 3.77–5.28)
RH BLD: POSITIVE
RUBV IGG SERPL IA-ACNC: 4.4 INDEX
SODIUM SERPL-SCNC: 140 MMOL/L (ref 134–144)
T PALLIDUM AB SER QL IA: NEGATIVE
VZV IGG SER IA-ACNC: 844 INDEX
WBC # BLD AUTO: 7.7 X10E3/UL (ref 3.4–10.8)

## 2017-07-07 ENCOUNTER — APPOINTMENT (OUTPATIENT)
Dept: ULTRASOUND IMAGING | Age: 32
End: 2017-07-07
Attending: EMERGENCY MEDICINE
Payer: COMMERCIAL

## 2017-07-07 ENCOUNTER — HOSPITAL ENCOUNTER (EMERGENCY)
Age: 32
Discharge: HOME | End: 2017-07-07
Attending: EMERGENCY MEDICINE | Admitting: EMERGENCY MEDICINE
Payer: COMMERCIAL

## 2017-07-07 VITALS
HEIGHT: 65 IN | TEMPERATURE: 98.1 F | WEIGHT: 141 LBS | BODY MASS INDEX: 23.49 KG/M2 | HEART RATE: 80 BPM | DIASTOLIC BLOOD PRESSURE: 70 MMHG | OXYGEN SATURATION: 100 % | RESPIRATION RATE: 15 BRPM | SYSTOLIC BLOOD PRESSURE: 111 MMHG

## 2017-07-07 DIAGNOSIS — O20.0 THREATENED MISCARRIAGE: ICD-10-CM

## 2017-07-07 DIAGNOSIS — R10.31 ABDOMINAL PAIN, RIGHT LOWER QUADRANT: Primary | ICD-10-CM

## 2017-07-07 LAB
ALBUMIN SERPL BCP-MCNC: 3.8 G/DL (ref 3.5–5)
ALBUMIN/GLOB SERPL: 1 {RATIO} (ref 1.1–2.2)
ALP SERPL-CCNC: 44 U/L (ref 45–117)
ALT SERPL-CCNC: 20 U/L (ref 12–78)
ANION GAP BLD CALC-SCNC: 7 MMOL/L (ref 5–15)
APPEARANCE UR: CLEAR
AST SERPL W P-5'-P-CCNC: 15 U/L (ref 15–37)
BACTERIA URNS QL MICRO: NEGATIVE /HPF
BASOPHILS # BLD AUTO: 0 K/UL (ref 0–0.1)
BASOPHILS # BLD: 0 % (ref 0–1)
BILIRUB SERPL-MCNC: 0.3 MG/DL (ref 0.2–1)
BILIRUB UR QL: NEGATIVE
BUN SERPL-MCNC: 3 MG/DL (ref 6–20)
BUN/CREAT SERPL: 4 (ref 12–20)
CALCIUM SERPL-MCNC: 8.7 MG/DL (ref 8.5–10.1)
CHLORIDE SERPL-SCNC: 103 MMOL/L (ref 97–108)
CO2 SERPL-SCNC: 27 MMOL/L (ref 21–32)
COLOR UR: ABNORMAL
CREAT SERPL-MCNC: 0.71 MG/DL (ref 0.55–1.02)
EOSINOPHIL # BLD: 0 K/UL (ref 0–0.4)
EOSINOPHIL NFR BLD: 0 % (ref 0–7)
EPITH CASTS URNS QL MICRO: ABNORMAL /LPF
ERYTHROCYTE [DISTWIDTH] IN BLOOD BY AUTOMATED COUNT: 13.7 % (ref 11.5–14.5)
GLOBULIN SER CALC-MCNC: 4 G/DL (ref 2–4)
GLUCOSE SERPL-MCNC: 94 MG/DL (ref 65–100)
GLUCOSE UR STRIP.AUTO-MCNC: NEGATIVE MG/DL
HCG SERPL-ACNC: 2688 MIU/ML (ref 0–6)
HCT VFR BLD AUTO: 32.5 % (ref 35–47)
HGB BLD-MCNC: 11 G/DL (ref 11.5–16)
HGB UR QL STRIP: NEGATIVE
KETONES UR QL STRIP.AUTO: ABNORMAL MG/DL
LEUKOCYTE ESTERASE UR QL STRIP.AUTO: NEGATIVE
LIPASE SERPL-CCNC: 104 U/L (ref 73–393)
LYMPHOCYTES # BLD AUTO: 21 % (ref 12–49)
LYMPHOCYTES # BLD: 1.7 K/UL (ref 0.8–3.5)
MCH RBC QN AUTO: 25.9 PG (ref 26–34)
MCHC RBC AUTO-ENTMCNC: 33.8 G/DL (ref 30–36.5)
MCV RBC AUTO: 76.7 FL (ref 80–99)
MONOCYTES # BLD: 0.6 K/UL (ref 0–1)
MONOCYTES NFR BLD AUTO: 7 % (ref 5–13)
NEUTS SEG # BLD: 6.1 K/UL (ref 1.8–8)
NEUTS SEG NFR BLD AUTO: 72 % (ref 32–75)
NITRITE UR QL STRIP.AUTO: NEGATIVE
PH UR STRIP: 6 [PH] (ref 5–8)
PLATELET # BLD AUTO: 231 K/UL (ref 150–400)
POTASSIUM SERPL-SCNC: 3.2 MMOL/L (ref 3.5–5.1)
PROT SERPL-MCNC: 7.8 G/DL (ref 6.4–8.2)
PROT UR STRIP-MCNC: NEGATIVE MG/DL
RBC # BLD AUTO: 4.24 M/UL (ref 3.8–5.2)
RBC #/AREA URNS HPF: ABNORMAL /HPF (ref 0–5)
SODIUM SERPL-SCNC: 137 MMOL/L (ref 136–145)
SP GR UR REFRACTOMETRY: 1.01 (ref 1–1.03)
UA: UC IF INDICATED,UAUC: ABNORMAL
UROBILINOGEN UR QL STRIP.AUTO: 0.2 EU/DL (ref 0.2–1)
WBC # BLD AUTO: 8.4 K/UL (ref 3.6–11)
WBC URNS QL MICRO: ABNORMAL /HPF (ref 0–4)

## 2017-07-07 PROCEDURE — 96361 HYDRATE IV INFUSION ADD-ON: CPT

## 2017-07-07 PROCEDURE — 36415 COLL VENOUS BLD VENIPUNCTURE: CPT | Performed by: EMERGENCY MEDICINE

## 2017-07-07 PROCEDURE — 74011250636 HC RX REV CODE- 250/636: Performed by: EMERGENCY MEDICINE

## 2017-07-07 PROCEDURE — 83690 ASSAY OF LIPASE: CPT | Performed by: EMERGENCY MEDICINE

## 2017-07-07 PROCEDURE — 87086 URINE CULTURE/COLONY COUNT: CPT | Performed by: EMERGENCY MEDICINE

## 2017-07-07 PROCEDURE — 81001 URINALYSIS AUTO W/SCOPE: CPT | Performed by: EMERGENCY MEDICINE

## 2017-07-07 PROCEDURE — 99283 EMERGENCY DEPT VISIT LOW MDM: CPT

## 2017-07-07 PROCEDURE — 80053 COMPREHEN METABOLIC PANEL: CPT | Performed by: EMERGENCY MEDICINE

## 2017-07-07 PROCEDURE — 96360 HYDRATION IV INFUSION INIT: CPT

## 2017-07-07 PROCEDURE — 76817 TRANSVAGINAL US OBSTETRIC: CPT

## 2017-07-07 PROCEDURE — 85025 COMPLETE CBC W/AUTO DIFF WBC: CPT | Performed by: EMERGENCY MEDICINE

## 2017-07-07 PROCEDURE — 76801 OB US < 14 WKS SINGLE FETUS: CPT

## 2017-07-07 PROCEDURE — 84702 CHORIONIC GONADOTROPIN TEST: CPT | Performed by: EMERGENCY MEDICINE

## 2017-07-07 RX ADMIN — SODIUM CHLORIDE 1000 ML: 900 INJECTION, SOLUTION INTRAVENOUS at 19:45

## 2017-07-07 NOTE — PROGRESS NOTES
CBC ( Hg 11.3), CMP WNL. Blood type A positive. Hep B/ HIV negative. Varicella and Rubella immune. Will discuss the results on next visit.

## 2017-07-07 NOTE — ED PROVIDER NOTES
HPI Comments: 33 y.o. female with no significant past medical history who presents from home with chief complaint of abdominal pain. Patient is A0 and reportedly found out she was pregnant on Monday (4 days ago). Her LMP was 17 and patient reports that it was normal. She states that she started to have vaginal bleeding/spotting on  (5 days ago). Pt reports passing clots two days ago but states that she did not have any bleeding today. She reports having sudden onset of right sided abdominal pain that radiates to her R flank since 0600 this morning (13 hours ago). She also complains of 3 episodes of diarrhea since this morning along with increased urinary frequency. Patient denies having any vomiting or fever. She states that she is unsure of any blood in her stool. There are no other acute medical concerns at this time. Social hx: nonsmoker, no EtOH use, no drug use  PCP: Lauryn Rojas MD    Note written by Lynn Olson, as dictated by Alyssa Solitario MD 7:27 PM      The history is provided by the patient. No  was used. No past medical history on file. No past surgical history on file. Family History:   Problem Relation Age of Onset    Cancer Mother      breast    Cancer Father      prostate       Social History     Social History    Marital status: UNKNOWN     Spouse name: N/A    Number of children: N/A    Years of education: N/A     Occupational History    Not on file. Social History Main Topics    Smoking status: Never Smoker    Smokeless tobacco: Never Used    Alcohol use No    Drug use: No    Sexual activity: Not Currently     Partners: Male     Other Topics Concern    Not on file     Social History Narrative         ALLERGIES: Review of patient's allergies indicates no known allergies. Review of Systems   Constitutional: Negative for fever. Eyes: Negative for visual disturbance.    Respiratory: Negative for cough, shortness of breath and wheezing. Cardiovascular: Negative for chest pain and leg swelling. Gastrointestinal: Positive for abdominal pain and diarrhea. Negative for nausea and vomiting. Genitourinary: Positive for flank pain and frequency. Negative for dysuria. Musculoskeletal: Negative for back pain and neck stiffness. Skin: Negative for rash. Neurological: Negative. Negative for syncope and headaches. Psychiatric/Behavioral: Negative for confusion. All other systems reviewed and are negative. Vitals:    07/07/17 1911   BP: 115/59   Pulse: 84   Resp: 14   Temp: 98.1 °F (36.7 °C)   SpO2: 100%   Weight: 64 kg (141 lb)   Height: 5' 5\" (1.651 m)            Physical Exam   Constitutional: She appears well-developed and well-nourished. No distress. HENT:   Head: Normocephalic. Eyes: Pupils are equal, round, and reactive to light. Neck: Normal range of motion. Cardiovascular: Normal rate and regular rhythm. No murmur heard. Pulmonary/Chest: Effort normal and breath sounds normal. No respiratory distress. Abdominal: Soft. There is tenderness (greater in RLQ) in the right upper quadrant and right lower quadrant. Musculoskeletal: Normal range of motion. She exhibits no edema. Neurological: She is alert. She has normal strength. No cranial nerve deficit. Skin: Skin is warm and dry. Psychiatric: She has a normal mood and affect. Her behavior is normal.   Nursing note and vitals reviewed. Note written by Lynn Horn, as dictated by Ish Sanchez MD 7:27 PM    [facility]  ED Course       Procedures  9:45 PM  Patient's results have been reviewed with them. Discussed ultrasound results with patient, which indicate possible miscarriage/miscarriage in progress. Recommended patient to have repeat HCG or ultrasound to check for continuing miscarriage in 2-3 days.  Patient and/or family have verbally conveyed their understanding and agreement of the patient's signs, symptoms, diagnosis, treatment and prognosis and additionally agree to follow up as recommended or return to the Emergency Room should their condition change prior to follow-up. Discharge instructions have also been provided to the patient with some educational information regarding their diagnosis as well a list of reasons why they would want to return to the ER prior to their follow-up appointment should their condition change.

## 2017-07-07 NOTE — ED NOTES
Assuming pt care, introduced self to pt as primary nurse. Adv pt to notify if pt did not want medical information discussed in the presence of family and visitors. Pt expressed understanding. Pt ambulatory to attempt to provide urine sample. Steady gait.

## 2017-07-08 NOTE — ED NOTES
Pt discharged with teaching given to pt by provider. Pt given opportunity to ask questions. Pt expressed understanding. Pt ambulatory with self with no signs of distress.

## 2017-07-08 NOTE — DISCHARGE INSTRUCTIONS
Threatened Miscarriage: Care Instructions  Your Care Instructions    Some women have light spotting or bleeding during the first 12 weeks of pregnancy. In some cases this is normal. Light spotting or bleeding can also be a sign of a possible loss of the pregnancy. This is called a threatened miscarriage. At this point, the doctor may not be able to tell if your vaginal bleeding is normal or is a sign of a miscarriage. In early pregnancy, things such as stress, exercise, and sex do not cause miscarriage. You may be worried or upset about the possibility of losing your pregnancy. But do not blame yourself. There is no treatment to stop a threatened miscarriage. If you do have a miscarriage, there was nothing you could have done to prevent it. A miscarriage usually means that the pregnancy is not developing normally. The doctor has checked you carefully, but problems can develop later. If you notice any problems or new symptoms, get medical treatment right away. Follow-up care is a key part of your treatment and safety. Be sure to make and go to all appointments, and call your doctor if you are having problems. It's also a good idea to know your test results and keep a list of the medicines you take. How can you care for yourself at home? · If you do have a miscarriage, you will probably have some vaginal bleeding for 1 to 2 weeks. Use pads instead of tampons. · Take acetaminophen (Tylenol) for cramps. Read and follow all instructions on the label. · Do not take two or more pain medicines at the same time unless the doctor told you to. Many pain medicines have acetaminophen, which is Tylenol. Too much acetaminophen (Tylenol) can be harmful. · Do not have sex until your doctor says it is okay. · Get lots of rest over the next several days. · You may do your normal activities if you feel well enough to do them. But do not do any heavy exercise until your doctor says it is okay.   · Eat a balanced diet that is high in iron and vitamin C. Foods rich in iron include red meat, shellfish, eggs, beans, and leafy green vegetables. Foods high in vitamin C include citrus fruits, tomatoes, and broccoli. Talk to your doctor about whether you need to take iron pills or a multivitamin. · Do not drink alcohol or use tobacco or illegal drugs. · Do not smoke. If you need help quitting, talk to your doctor about stop-smoking programs and medicines. These can increase your chances of quitting for good. When should you call for help? Call 911 anytime you think you may need emergency care. For example, call if:  · You have sudden, severe pain in your belly or pelvis. · You passed out (lost consciousness). · You have severe vaginal bleeding. Call your doctor now or seek immediate medical care if:  · You are dizzy or lightheaded, or you feel like you may faint. · You have new or increased pain in your belly or pelvis. · Your vaginal bleeding is getting worse. · You have increased pain in the vaginal area. · You have a fever. · You think you may have passed tissue. Save any tissue that you pass. Take it to your doctor's office as soon as you can. Watch closely for changes in your health, and be sure to contact your doctor if:  · You have new or worse vaginal discharge. · You do not get better as expected. Where can you learn more? Go to http://roland-agueda.info/. Enter F040 in the search box to learn more about \"Threatened Miscarriage: Care Instructions. \"  Current as of: March 16, 2017  Content Version: 11.3  © 4922-5329 [employer]. Care instructions adapted under license by [employer] (which disclaims liability or warranty for this information). If you have questions about a medical condition or this instruction, always ask your healthcare professional. [address] any warranty or liability for your use of this information.          Abdominal Pain: Care Instructions  Your Care Instructions    Abdominal pain has many possible causes. Some aren't serious and get better on their own in a few days. Others need more testing and treatment. If your pain continues or gets worse, you need to be rechecked and may need more tests to find out what is wrong. You may need surgery to correct the problem. Don't ignore new symptoms, such as fever, nausea and vomiting, urination problems, pain that gets worse, and dizziness. These may be signs of a more serious problem. Your doctor may have recommended a follow-up visit in the next 8 to 12 hours. If you are not getting better, you may need more tests or treatment. The doctor has checked you carefully, but problems can develop later. If you notice any problems or new symptoms, get medical treatment right away. Follow-up care is a key part of your treatment and safety. Be sure to make and go to all appointments, and call your doctor if you are having problems. It's also a good idea to know your test results and keep a list of the medicines you take. How can you care for yourself at home? · Rest until you feel better. · To prevent dehydration, drink plenty of fluids, enough so that your urine is light yellow or clear like water. Choose water and other caffeine-free clear liquids until you feel better. If you have kidney, heart, or liver disease and have to limit fluids, talk with your doctor before you increase the amount of fluids you drink. · If your stomach is upset, eat mild foods, such as rice, dry toast or crackers, bananas, and applesauce. Try eating several small meals instead of two or three large ones. · Wait until 48 hours after all symptoms have gone away before you have spicy foods, alcohol, and drinks that contain caffeine. · Do not eat foods that are high in fat. · Avoid anti-inflammatory medicines such as aspirin, ibuprofen (Advil, Motrin), and naproxen (Aleve). These can cause stomach upset.  Talk to your doctor if you take daily aspirin for another health problem. When should you call for help? Call 911 anytime you think you may need emergency care. For example, call if:  · You passed out (lost consciousness). · You pass maroon or very bloody stools. · You vomit blood or what looks like coffee grounds. · You have new, severe belly pain. Call your doctor now or seek immediate medical care if:  · Your pain gets worse, especially if it becomes focused in one area of your belly. · You have a new or higher fever. · Your stools are black and look like tar, or they have streaks of blood. · You have unexpected vaginal bleeding. · You have symptoms of a urinary tract infection. These may include:  ¨ Pain when you urinate. ¨ Urinating more often than usual.  ¨ Blood in your urine. · You are dizzy or lightheaded, or you feel like you may faint. Watch closely for changes in your health, and be sure to contact your doctor if:  · You are not getting better after 1 day (24 hours). Where can you learn more? Go to http://roland-agueda.info/. Enter F593 in the search box to learn more about \"Abdominal Pain: Care Instructions. \"  Current as of: March 20, 2017  Content Version: 11.3  © 0441-0594 [employer]. Care instructions adapted under license by [employer] (which disclaims liability or warranty for this information). If you have questions about a medical condition or this instruction, always ask your healthcare professional. [place] [address] any warranty or liability for your use of this information.

## 2017-07-09 LAB
BACTERIA SPEC CULT: NORMAL
CC UR VC: NORMAL
CYTOLOGIST CVX/VAG CYTO: NORMAL
CYTOLOGY CVX/VAG DOC THIN PREP: NORMAL
DX ICD CODE: NORMAL
HPV I/H RISK 4 DNA CVX QL PROBE+SIG AMP: NEGATIVE
Lab: NORMAL
OTHER STN SPEC: NORMAL
PATH REPORT.FINAL DX SPEC: NORMAL
SERVICE CMNT-IMP: NORMAL
STAT OF ADQ CVX/VAG CYTO-IMP: NORMAL

## 2017-07-10 ENCOUNTER — OFFICE VISIT (OUTPATIENT)
Dept: FAMILY MEDICINE CLINIC | Age: 32
End: 2017-07-10

## 2017-07-10 VITALS
HEIGHT: 65 IN | OXYGEN SATURATION: 98 % | HEART RATE: 72 BPM | SYSTOLIC BLOOD PRESSURE: 111 MMHG | RESPIRATION RATE: 18 BRPM | TEMPERATURE: 98.5 F | BODY MASS INDEX: 23.32 KG/M2 | WEIGHT: 140 LBS | DIASTOLIC BLOOD PRESSURE: 78 MMHG

## 2017-07-10 DIAGNOSIS — O03.9 COMPLETE MISCARRIAGE: Primary | ICD-10-CM

## 2017-07-10 NOTE — LETTER
NOTIFICATION RETURN TO WORK / SCHOOL 
 
7/10/2017 10:20 AM 
 
Ms. Lovely Carrillo [address] [address] To Whom It May Concern: 
 
Lovely Carrillo is currently under the care of [facility]. She will return to work/school on: 7/12/17 If there are questions or concerns please have the patient contact our office. Sincerely, Shiv Reddy MD

## 2017-07-10 NOTE — PROGRESS NOTES
Visit Vitals    /78    Pulse 72    Temp 98.5 °F (36.9 °C) (Oral)    Resp 18    Ht 5' 5\" (1.651 m)    Wt 140 lb (63.5 kg)    LMP 05/28/2017    SpO2 98%    BMI 23.3 kg/m2     Chief Complaint   Patient presents with   [facility] Follow Up     still cramp

## 2017-07-10 NOTE — MR AVS SNAPSHOT
Visit Information Date & Time Provider Department Dept. Phone Encounter #  
 7/10/2017  9:00 AM Ravinder Andrade MD [facility] [phone] [id number] Your Appointments 7/11/2017  4:00 PM  
PROCEDURE with Sonia Jakcson MD  
[facility] [address] [place] [address] Road) Appt Note: dating ultrasound [facility] [facility]  
[phone]  
  
   
 [address] [facility] [phone]  
  
    
 7/21/2017 11:05 AM  
NEW OB Patient with Ravinder Andrade MD  
[facility]) Appt Note: New Ob  
 [address] [facility] [facility]  
[phone]  
  
   
 [address] [place] [address],[phone] Upcoming Health Maintenance Date Due DTaP/Tdap/Td series (1 - Tdap) 9/2/2004 PAP AKA CERVICAL CYTOLOGY 10/18/2014 INFLUENZA AGE 9 TO ADULT 8/1/2017 Allergies as of 7/10/2017  Review Complete On: 7/10/2017 By: Bernie Adorno LPN No Known Allergies Current Immunizations  Reviewed on 7/3/2017 No immunizations on file. Not reviewed this visit You Were Diagnosed With   
  
 Codes Comments Threatened miscarriage in early pregnancy    -  Primary ICD-10-CM: O20.0 ICD-9-CM: 640.03 Vitals BP Pulse Temp Resp Height(growth percentile) Weight(growth percentile) 111/78 72 98.5 °F (36.9 °C) (Oral) 18 5' 5\" (1.651 m) 140 lb (63.5 kg) LMP SpO2 BMI OB Status Smoking Status 05/28/2017 98% 23.3 kg/m2 Pregnant Never Smoker Vitals History BMI and BSA Data Body Mass Index Body Surface Area  
 23.3 kg/m 2 1.71 m 2 Preferred Pharmacy Pharmacy Name Phone [facility] [address] [place] [address] [phone] Your Updated Medication List  
  
   
This list is accurate as of: 7/10/17 10:04 AM.  Always use your most recent med list.  
  
  
  
  
 CLARITIN 10 mg tablet Generic drug:  loratadine Take 10 mg by mouth. fluticasone 50 mcg/actuation nasal spray Commonly known as:  Marcine Infield 2 Sprays by Both Nostrils route daily. prenatal vit-calcium-iron-fa 27 mg iron- 1 mg Tab Commonly known as:  PRENATAL PLUS with CALCIUM Take 1 Tab by mouth daily. pseudoephedrine 30 mg tablet Commonly known as:  SUDAFED Take 1 Tab by mouth every six (6) hours as needed for Congestion. We Performed the Following TOTAL HCG, QT. [95141 CPT(R)] Patient Instructions Threatened Miscarriage: After Your Visit to the Emergency Room Your Care Instructions Some women have light spotting or bleeding during the first 12 weeks of pregnancy. In some cases this is normal. Light spotting or bleeding can also be a sign of a possible loss of the pregnancy. This is called a threatened miscarriage. At this point, the doctor may not be able to tell if your vaginal bleeding is normal or a threatened miscarriage. In early pregnancy, things such as stress, exercise, and sex do not cause vaginal bleeding or miscarriage. You may be worried or upset about the possibility of losing your pregnancy. But do not blame yourself. There is no treatment to stop a threatened miscarriage. If you do have a miscarriage, there was nothing you could have done to prevent it. A miscarriage usually means that the pregnancy is not developing normally. Even though you have been released from the emergency room, you still need to watch for any problems. The doctor carefully checked you. But sometimes problems can develop later. If you have new symptoms, or if your symptoms do not get better, return to the emergency room or call your doctor right away. A visit to the emergency room is only one step in your treatment.  Even if you feel better, you still need to do what your doctor recommends, such as going to all suggested follow-up appointments and taking medicines exactly as directed. This will help you recover and help prevent future problems. How can you care for yourself at home? · If you do have a miscarriage, you will probably have some vaginal bleeding for 1 to 2 weeks. Use pads instead of tampons. Count the pads you use every day, and write it down. This will help you know if the bleeding is stopping. · Take acetaminophen (Tylenol) for cramps. Read and follow all instructions on the label. · Do not take two or more pain medicines at the same time unless the doctor told you to. Many pain medicines have acetaminophen, which is Tylenol. Too much acetaminophen (Tylenol) can be harmful. · Do not have sex until your doctor says it is okay. · Get lots of rest over the next several days. · You may do your normal activities if you feel well enough to do them. But do not do any heavy exercise until your doctor says it is okay. · Eat a balanced diet that is high in iron and vitamin C. Foods rich in iron include red meat, shellfish, eggs, beans, and leafy green vegetables. Foods high in vitamin C include citrus fruits, tomatoes, and broccoli. Talk to your doctor about whether you need to take iron pills or a multivitamin. · Do not drink alcohol or use tobacco or illegal drugs. · Do not smoke. If you need help quitting, talk to your doctor about stop-smoking programs and medicines. These can increase your chances of quitting for good. When should you call for help? Call 911 if: 
· You have sudden, severe pain in your belly. · You passed out (lost consciousness). Return to the emergency room now if: 
· You have severe vaginal bleeding. You are passing blood clots and soaking through your usual pads each hour for 2 or more hours. · Vaginal bleeding restarts. Call your doctor today if: 
· You have cramps or pain in your belly or pelvis. · You have a fever. · You have vaginal discharge that smells bad. · Vaginal bleeding has not stopped completely after 3 days. Where can you learn more? Go to DealBringMeThater.be Enter F225 in the search box to learn more about \"Threatened Miscarriage: After Your Visit to the Emergency Room. \"  
© 1655-6382 [employer]wise, [employer]. Care instructions adapted under license by Lidia Guerrero (which disclaims liability or warranty for this information). This care instruction is for use with your licensed healthcare professional. If you have questions about a medical condition or this instruction, always ask your healthcare professional. [address] any warranty or liability for your use of this information. Content Version: 9.4.03909; Last Revised: December 9, 2010 Introducing [facility]! Lidia Guerrero introduces intelloCut patient portal. Now you can access parts of your medical record, email your doctor's office, and request medication refills online. 1. In your internet browser, go to [email or web address] 2. Click on the First Time User? Click Here link in the Sign In box. You will see the New Member Sign Up page. 3. Enter your intelloCut Access Code exactly as it appears below. You will not need to use this code after youve completed the sign-up process. If you do not sign up before the expiration date, you must request a new code. · intelloCut Access Code: [email or web address] Expires: 10/1/2017  4:02 PM 
 
4. Enter the last four digits of your Social Security Number (xxxx) and Date of Birth (mm/dd/yyyy) as indicated and click Submit. You will be taken to the next sign-up page. 5. Create a RedOak Logict ID. This will be your intelloCut login ID and cannot be changed, so think of one that is secure and easy to remember. 6. Create a intelloCut password. You can change your password at any time. 7. Enter your Password Reset Question and Answer. This can be used at a later time if you forget your password. 8. Enter your e-mail address. You will receive e-mail notification when new information is available in [address] 9. Click Sign Up. You can now view and download portions of your medical record. 10. Click the Download Summary menu link to download a portable copy of your medical information. If you have questions, please visit the Frequently Asked Questions section of the Someecards website. Remember, Someecards is NOT to be used for urgent needs. For medical emergencies, dial 911. Now available from your iPhone and Android! Please provide this summary of care documentation to your next provider. Your primary care clinician is listed as Pavel Santos. If you have any questions after today's visit, please call [phone].

## 2017-07-10 NOTE — PATIENT INSTRUCTIONS
Threatened Miscarriage: After Your Visit to the Emergency Room  Your Care Instructions  Some women have light spotting or bleeding during the first 12 weeks of pregnancy. In some cases this is normal. Light spotting or bleeding can also be a sign of a possible loss of the pregnancy. This is called a threatened miscarriage. At this point, the doctor may not be able to tell if your vaginal bleeding is normal or a threatened miscarriage. In early pregnancy, things such as stress, exercise, and sex do not cause vaginal bleeding or miscarriage. You may be worried or upset about the possibility of losing your pregnancy. But do not blame yourself. There is no treatment to stop a threatened miscarriage. If you do have a miscarriage, there was nothing you could have done to prevent it. A miscarriage usually means that the pregnancy is not developing normally. Even though you have been released from the emergency room, you still need to watch for any problems. The doctor carefully checked you. But sometimes problems can develop later. If you have new symptoms, or if your symptoms do not get better, return to the emergency room or call your doctor right away. A visit to the emergency room is only one step in your treatment. Even if you feel better, you still need to do what your doctor recommends, such as going to all suggested follow-up appointments and taking medicines exactly as directed. This will help you recover and help prevent future problems. How can you care for yourself at home? · If you do have a miscarriage, you will probably have some vaginal bleeding for 1 to 2 weeks. Use pads instead of tampons. Count the pads you use every day, and write it down. This will help you know if the bleeding is stopping. · Take acetaminophen (Tylenol) for cramps. Read and follow all instructions on the label. · Do not take two or more pain medicines at the same time unless the doctor told you to.  Many pain medicines have acetaminophen, which is Tylenol. Too much acetaminophen (Tylenol) can be harmful. · Do not have sex until your doctor says it is okay. · Get lots of rest over the next several days. · You may do your normal activities if you feel well enough to do them. But do not do any heavy exercise until your doctor says it is okay. · Eat a balanced diet that is high in iron and vitamin C. Foods rich in iron include red meat, shellfish, eggs, beans, and leafy green vegetables. Foods high in vitamin C include citrus fruits, tomatoes, and broccoli. Talk to your doctor about whether you need to take iron pills or a multivitamin. · Do not drink alcohol or use tobacco or illegal drugs. · Do not smoke. If you need help quitting, talk to your doctor about stop-smoking programs and medicines. These can increase your chances of quitting for good. When should you call for help? Call 911 if:  · You have sudden, severe pain in your belly. · You passed out (lost consciousness). Return to the emergency room now if:  · You have severe vaginal bleeding. You are passing blood clots and soaking through your usual pads each hour for 2 or more hours. · Vaginal bleeding restarts. Call your doctor today if:  · You have cramps or pain in your belly or pelvis. · You have a fever. · You have vaginal discharge that smells bad. · Vaginal bleeding has not stopped completely after 3 days. Where can you learn more? Go to [employer]  Enter T476 in the search box to learn more about \"Threatened Miscarriage: After Your Visit to the Emergency Room. \"   © 6560-3877 [employer]wise, Incorporated. Care instructions adapted under license by [employer] (which disclaims liability or warranty for this information).  This care instruction is for use with your licensed healthcare professional. If you have questions about a medical condition or this instruction, always ask your healthcare professional. [facility] disclaims any warranty or liability for your use of this information. Content Version: 9.4.16277;  Last Revised: December 9, 2010

## 2017-07-10 NOTE — PROGRESS NOTES
Subjective:   Kevin Maier is an 33 y.o. female who presents for the follow up after ED visit. She is  at 6w1d dated by LMP    The pt was seen in the ER on 2017 for the vaginal bleeding and abdominal pain. The night prior to visit the pt started to have significant vaginal bleeding associated with uterine cramping. She denies any trauma, no sexual intercourse prior to event. She went to ER where the total hcg was collected and vaginal US was concerning for miscarriage. She was sent home and was recommended to follow up in 2-3 days. Since the visit vaginal bleeding stopped. She is still having mild abdominal cramping. No fever, no SOB. No dysuria. No vaginal discharge. Allergies - reviewed:   No Known Allergies      Medications - reviewed:  Current Outpatient Prescriptions   Medication Sig    prenatal vit-calcium-iron-fa (PRENATAL PLUS WITH CALCIUM) 27 mg iron- 1 mg tab Take 1 Tab by mouth daily.  loratadine (CLARITIN) 10 mg tablet Take 10 mg by mouth.  fluticasone (FLONASE) 50 mcg/actuation nasal spray 2 Sprays by Both Nostrils route daily.  pseudoephedrine (SUDAFED) 30 mg tablet Take 1 Tab by mouth every six (6) hours as needed for Congestion. No current facility-administered medications for this visit. Past Medical History - reviewed:  History reviewed. No pertinent past medical history. Past Surgical History - reviewed:  History reviewed. No pertinent surgical history. Family History - reviewed:  Family History   Problem Relation Age of Onset    Cancer Mother      breast    Cancer Father      prostate         Social History - reviewed:  Social History     Social History    Marital status: UNKNOWN     Spouse name: N/A    Number of children: N/A    Years of education: N/A     Occupational History    Not on file.      Social History Main Topics    Smoking status: Never Smoker    Smokeless tobacco: Never Used    Alcohol use No    Drug use: No    Sexual activity: Not Currently     Partners: Male     Other Topics Concern    Not on file     Social History Narrative         Review of Systems   CONSTITUTIONAL: denies fever. Denies chills. CARDIOVASCULAR: denies chest pain. Denies palpitations  RESPIRATORY: denies shortness of breath  : No dysuria. Uterine cramping. Objective:     Visit Vitals    /78    Pulse 72    Temp 98.5 °F (36.9 °C) (Oral)    Resp 18    Ht 5' 5\" (1.651 m)    Wt 140 lb (63.5 kg)    LMP 2017    SpO2 98%    BMI 23.3 kg/m2       General appearance - alert, well appearing, and in no distress  Chest - clear to auscultation, no wheezes, rales or rhonchi, symmetric air entry  Heart - normal rate, regular rhythm, normal S1, S2, no murmurs, rubs, clicks or gallops  Abdomen - soft, nontender, nondistended, no masses or organomegaly  Pelvic exam: Exam chaperoned by Nelia Ordaz LPN. External genitalia normal without rashes or lesions. Pink and moist vaginal mucosa. No discharge. Cervix closed, without lesions or abnormal discharge, no bleeding coming from the cervix is seen. Assessment:   32 yo  at 6w1d is here for possible miscarriage. ICD-10-CM ICD-9-CM    1. Complete miscarriage O03.9 634.92 TOTAL HCG, QT.      TOTAL HCG, QT. Plan:   -The clinical presentation, US findings and physical exam today are concerning for complete miscarriage. Will check total HCG today. If the level decreased which will be consistent with compete miscarriage, will recheck bHCG in 1 week and continue to recheck until the level drop to 0. Will following the bHCG level, will strongly advise the pt against the conception until bhcg drop to 0. Follow-up Disposition:  Return in about 1 week (around 2017) for Bhcg check. The patient was discussed with Dr. Ravinder Orr ( the attending physician)      I have discussed the diagnosis with the patient and the intended plan as seen in the above orders.   The patient has received an after-visit summary and questions were answered concerning future plans. I have discussed medication side effects and warnings with the patient as well. Informed pt to return to the office if new symptoms arise.       Jolanta Nichols MD  Family Medicine Resident

## 2017-07-11 LAB — HCG INTACT+B SERPL-ACNC: 1046 MIU/ML

## 2017-07-11 NOTE — PROGRESS NOTES
Total hcg significantly decreased from the initial number. The findings are consistent with miscarriage give the recent clinical picture and US findings. Will call the pt and notified about the results. The pt will need to come back to repeat bHcg level in 1 week and continue to check the level until it drop to 0. Will strongly advise not to get pregnant until the bhcg level goes to 0.

## 2017-07-12 NOTE — PROGRESS NOTES
CBC, CMP WNL. Blood type A positive. HepB/HIV negative. PAP negative for IEl, hpv NEGATIVE. The pt had miscarriage. Will notify about the results.

## 2017-07-13 ENCOUNTER — TELEPHONE (OUTPATIENT)
Dept: FAMILY MEDICINE CLINIC | Age: 32
End: 2017-07-13

## 2017-07-13 NOTE — TELEPHONE ENCOUNTER
----- Message from Ely Galloway sent at 7/12/2017  5:09 PM EDT -----  Regarding: /telephone  Pt retuning a call. Best contact number is [phone].

## 2017-07-13 NOTE — TELEPHONE ENCOUNTER
Returning your call. Addendum:   Called the pt again. The pt was identified by 2 identifiers. Explained the test results to the pt - the findings consistent with complete miscarriage. Discussed with the pt the importance of following bHCG level until it goes to 0, also explained to avoid getting pregnant by using the contraception until the bHCG level goes to 0. The pt expressed understanding. She will come on Monday to draw the blood. The order is in place.     4:32 PM  7/13/2017  Alin Lacey MD

## 2017-07-14 NOTE — TELEPHONE ENCOUNTER
Called and spoke with patient as to make the lab appointment. The lab department also had has their lab schedule so the patient did make an appointment. Thanks.

## 2017-07-17 ENCOUNTER — LAB ONLY (OUTPATIENT)
Dept: FAMILY MEDICINE CLINIC | Age: 32
End: 2017-07-17

## 2017-07-17 DIAGNOSIS — O03.9 COMPLETE MISCARRIAGE: ICD-10-CM

## 2017-07-18 LAB — HCG INTACT+B SERPL-ACNC: 1255 MIU/ML

## 2017-07-18 NOTE — PROGRESS NOTES
bHcg slightly elevated from the previous reading. Will call and ask to come back again. Will continue checking until drop to 0.

## 2017-07-19 ENCOUNTER — TELEPHONE (OUTPATIENT)
Dept: FAMILY MEDICINE CLINIC | Age: 32
End: 2017-07-19

## 2017-07-19 DIAGNOSIS — O03.9 MISCARRIAGE: Primary | ICD-10-CM

## 2017-07-19 NOTE — TELEPHONE ENCOUNTER
Dr. Paty Lowe [address] [place] [address] Pt requesting call back from practice regarding her lab results.  Best contact number [phone].

## 2017-07-23 LAB — HCG INTACT+B SERPL-ACNC: 1348 MIU/ML

## 2017-07-24 ENCOUNTER — TELEPHONE (OUTPATIENT)
Dept: FAMILY MEDICINE CLINIC | Age: 32
End: 2017-07-24

## 2017-07-24 DIAGNOSIS — O03.9 MISCARRIAGE: Primary | ICD-10-CM

## 2017-07-24 NOTE — TELEPHONE ENCOUNTER
I called the pt to update on test results. The pt was identified by 2 identifiers. The total bHCG level continues to rise slightly. Will need to perform the US and repeat UPT. -US scheduled  in the hospital on 7/26/at 11.30 am   -The pt is informed to come 30 min earlier and drink 32-48 oz of water 1 h prior to appointment.      2:33 PM  7/24/2017  Jolanta Nichols MD

## 2017-07-25 ENCOUNTER — TELEPHONE (OUTPATIENT)
Dept: FAMILY MEDICINE | Age: 32
End: 2017-07-25

## 2017-07-26 ENCOUNTER — TELEPHONE (OUTPATIENT)
Dept: FAMILY MEDICINE CLINIC | Age: 32
End: 2017-07-26

## 2017-07-26 ENCOUNTER — HOSPITAL ENCOUNTER (OUTPATIENT)
Dept: ULTRASOUND IMAGING | Age: 32
Discharge: HOME | End: 2017-07-26
Attending: FAMILY MEDICINE
Payer: COMMERCIAL

## 2017-07-26 DIAGNOSIS — O03.9 MISCARRIAGE: ICD-10-CM

## 2017-07-26 PROCEDURE — 76801 OB US < 14 WKS SINGLE FETUS: CPT

## 2017-07-26 PROCEDURE — 76817 TRANSVAGINAL US OBSTETRIC: CPT

## 2017-07-26 NOTE — TELEPHONE ENCOUNTER
Patient would like to doctor to call her. Patient didn't disclose reason for call when asked. Call/[phone]    Addendum:  Discussed the case with Dr. Rubi Mcdaniels. Concern for the abnormal US with complex cyst and persistent number of bhcg level. I called the pt at [phone], the pt was identified by 2 identifiers. Updated the pt on test results. The pt had US done yesterday for the bhcg level being unchanged for the concern for retain products of conception. US with no evidence of retained products of conception however shows complex ovarian cyst. Due to concern for the ectopic pregnancy was patient was advised to the nearest hospital to get another US to be done and repeat bhcg level. The patient denies vaginal bleeding, which resolved on Tuesday, no abdominal pain or cramping. The pt reports that she is out of the town at [place]. She will find the hospital to repeat US and I will send the papers with the test results there. I gave her my cell phone number to call back. 5:30 PM  7/27/2017  Zacarias Calle MD      Addendum:   Attempted to call the pt to follow up if could find the place to get the [place] and [address] [facility]. No one answered. Left a message to call back and update. Left my call phone number again. 6:34 PM  7/27/2017  Zacarias Calle MD      The pt will go to [facility], she said she will go there now. I called the ER physician (Dr. Nino Villafuerte) and let him know that the pt is coming to the ER for the evaluation. Ask to repeat bhcg level and TVUS to exclude ectopic pregnancy. Will fax the [address] [place] [address] Dr results there.  -Will call the pt tomorrow morning to follow up  7:18 PM  7/27/2017  Zacarias Calle MD    Called the pt for the updates. The pt was seen in the ER yesterday where US and bhcg level were done. Per pt, US was unchanged, bhcg level was 1000.  She was advised to come back on Sunday ( 7/29/2017) to [facility] torepeat bhcg level.  -Will call on Monday    12:39 PM  Krishna Lind MD  7/28/2017

## 2017-07-27 RX ORDER — MINOCYCLINE HYDROCHLORIDE 50 MG/1
50 TABLET ORAL 2 TIMES DAILY
Qty: 180 TABLET | Refills: 1 | Status: SHIPPED | OUTPATIENT
Start: 2017-07-27 | End: 2017-08-01 | Stop reason: CLARIF

## 2017-07-27 NOTE — PROGRESS NOTES
I reviewed with the resident the medical history and the resident's findings on the physical examination. I discussed with the resident the patient's diagnosis and concur with the plan. RH positive.

## 2017-07-31 ENCOUNTER — TELEPHONE (OUTPATIENT)
Dept: FAMILY MEDICINE CLINIC | Age: 32
End: 2017-07-31

## 2017-07-31 NOTE — TELEPHONE ENCOUNTER
Called the pt to follow up on test results. Th ept was see at the [facility] yesterday ( 7/30/2017) where bHCG level was checked. The pt reports that the number was in 800s. She denies abdominal pain, vaginal bleeding.   -Advised the pt to draw the blood on Wednesday to repeat bhcg level. She will go to [facility] at [place]. Will send the request there.   12:27 PM  7/31/2017  Alana Vizcarra MD

## 2017-08-01 ENCOUNTER — TELEPHONE (OUTPATIENT)
Dept: FAMILY MEDICINE | Age: 32
End: 2017-08-01

## 2017-08-01 RX ORDER — MINOCYCLINE HYDROCHLORIDE 50 MG/1
50 CAPSULE ORAL 2 TIMES DAILY
Qty: 180 CAPSULE | Refills: 3 | Status: SHIPPED | OUTPATIENT
Start: 2017-08-01 | End: 2019-02-18 | Stop reason: ALTCHOICE

## 2017-08-04 LAB — HCG INTACT+B SERPL-ACNC: 724 MIU/ML

## 2017-08-04 NOTE — PROGRESS NOTES
BHCG is slowly dropping. Talked to the pt. She has no symptoms of abdominal cramping, vaginal bleeding.  Scheduled an appointment on 8/8/2017 to review the records from the hospital.

## 2017-08-07 ENCOUNTER — TELEPHONE (OUTPATIENT)
Dept: FAMILY MEDICINE CLINIC | Age: 32
End: 2017-08-07

## 2017-08-07 NOTE — TELEPHONE ENCOUNTER
----- Message from Nicole Barajas sent at 8/4/2017  5:11 PM EDT -----  Regarding: Dr. Jeffery Palacios states that [address] [place] [address] wants a medical release form faxed over to [phone]. Best contact number to reach pt is [phone].

## 2017-08-08 ENCOUNTER — OFFICE VISIT (OUTPATIENT)
Dept: FAMILY MEDICINE CLINIC | Age: 32
End: 2017-08-08

## 2017-08-08 VITALS
BODY MASS INDEX: 23.32 KG/M2 | TEMPERATURE: 98.5 F | SYSTOLIC BLOOD PRESSURE: 104 MMHG | HEART RATE: 66 BPM | RESPIRATION RATE: 16 BRPM | OXYGEN SATURATION: 100 % | DIASTOLIC BLOOD PRESSURE: 62 MMHG | WEIGHT: 140 LBS | HEIGHT: 65 IN

## 2017-08-08 DIAGNOSIS — Z13.31 DEPRESSION SCREEN: ICD-10-CM

## 2017-08-08 DIAGNOSIS — O03.9 MISCARRIAGE: Primary | ICD-10-CM

## 2017-08-08 NOTE — PROGRESS NOTES
Jann Casillas is an 33 y.o. female who presents for the follow up visit after the miscarriage. She was seen in [facility] on [facility] where she had US done and 2 subsequent bHCG levels. The   Intermittent lower abdominal pain. Pt was told that \" she did not need any additional procedures\". She was not given any test results, only instructions. No vaginal bleeding. No sexual intercourse since the miscarriage. Denies depression, decreased mood due to recent pregnancy loss. She reports that she \" had random intercourse with FOB and not very concerned that she lost the baby\". Allergies - reviewed:   No Known Allergies      Medications - reviewed:   Current Outpatient Prescriptions   Medication Sig    prenatal vit-calcium-iron-fa (PRENATAL PLUS WITH CALCIUM) 27 mg iron- 1 mg tab Take 1 Tab by mouth daily.  loratadine (CLARITIN) 10 mg tablet Take 10 mg by mouth.  fluticasone (FLONASE) 50 mcg/actuation nasal spray 2 Sprays by Both Nostrils route daily.  pseudoephedrine (SUDAFED) 30 mg tablet Take 1 Tab by mouth every six (6) hours as needed for Congestion. No current facility-administered medications for this visit. Past Medical History - reviewed:  No past medical history on file. Immunizations - reviewed: There is no immunization history on file for this patient. ROS  Review of Systems : A complete review of systems as performed and is negative except for those mentioned in the HPI. Physical Exam  Visit Vitals    /62    Pulse 66    Temp 98.5 °F (36.9 °C) (Oral)    Resp 16    Ht 5' 5\" (1.651 m)    Wt 140 lb (63.5 kg)    LMP 05/28/2017    SpO2 100%    Breastfeeding Unknown    BMI 23.3 kg/m2       General appearance - Alert, NAD. Respiratory - LCTAB. No wheeze/rale/rhonchi  Heart - Normal rate, regular rhythm. No m/r/r  Abdomen - +BS, Soft, Non distended.  MIld tenderness on the deep palpation in the lower quadrants bilaterally. Skin - normal coloration and normal turgor. No cyanosis, no rash. Assessment/Plan    Miscarriage. BHCG is dropping slower than expected. The pt did not provide any records from the previous evaluation from the [facility]. Due to concern of persistent bhcg level not decreasing appropriately ( ectopic vs molar?) will need to get another US. -Will repeat bhcg today  -MFM form was faxed today, will call tomorrow morning to make an appointment. Discussed the case with Dr. Bogdan Richard and Dr. Leela Cabrera ( over the phone). Dr. Leela Cabrera will review the chart tomorrow and will follow up on the [address] [facility]. May need to send the pt for D&C. Follow-up Disposition:  Return if symptoms worsen or fail to improve. I discussed the aforementioned diagnoses with the patient as well as the plan of care.      The pt was discussed with Dr. Bogdan Richard ( The attending physician)    Emelyn Francis MD  Family Medicine Resident  PGY 2

## 2017-08-08 NOTE — PATIENT INSTRUCTIONS
Miscarriage: Care Instructions  Your Care Instructions    The loss of a pregnancy can be very hard. You may wonder why it happened or blame yourself. Miscarriages are common and are not caused by exercise, stress, or sex. Most happen because the fertilized egg in the uterus does not develop normally. There is no treatment that can stop a miscarriage. As long as you do not have heavy blood loss, fever, weakness, or other signs of infection, you can let a miscarriage follow its own course. This can take several days. Your body will recover over the next several weeks. Having a miscarriage does not mean you cannot have a normal pregnancy in the future. The doctor has checked you carefully, but problems can develop later. If you notice any problems or new symptoms, get medical treatment right away. Follow-up care is a key part of your treatment and safety. Be sure to make and go to all appointments, and call your doctor if you are having problems. It's also a good idea to know your test results and keep a list of the medicines you take. How can you care for yourself at home? · You will probably have some vaginal bleeding for 1 to 2 weeks. It may be similar to or slightly heavier than a normal period. The bleeding should get lighter after a week. Use pads instead of tampons. You may use tampons during your next period, which should start in 3 to 6 weeks. · Take an over-the-counter pain medicine, such as acetaminophen (Tylenol), ibuprofen (Advil, Motrin), or naproxen (Aleve) for cramps. Read and follow all instructions on the label. You may have cramps for several days after the miscarriage. · Do not take two or more pain medicines at the same time unless the doctor told you to. Many pain medicines have acetaminophen, which is Tylenol. Too much acetaminophen (Tylenol) can be harmful. · Use a clear container to save any tissue that you pass. Take it to your doctor's office as soon as you can.   · Do not have sex until the bleeding stops. · You may return to your normal activities if you feel well enough to do so. But you should avoid heavy exercise until the bleeding stops. · If you plan to get pregnant again, check with your doctor. Most doctors suggest waiting until you have had at least one normal period before you try to get pregnant. · If you do not want to get pregnant, ask your doctor about birth control. You can get pregnant again before your next period starts if you are not using birth control. · You may be low in iron because of blood loss. Eat a balanced diet that is high in iron and vitamin C. Foods rich in iron include red meat, shellfish, eggs, beans, and leafy green vegetables. Foods high in vitamin C include citrus fruits, tomatoes, and broccoli. Talk to your doctor about whether you need to take iron pills or a multivitamin. · The loss of a pregnancy can be very hard. You may wonder why it happened and blame yourself. Talking to family members, friends, a counselor, or your doctor may help you cope with your loss. When should you call for help? Call 911 anytime you think you may need emergency care. For example, call if:  · You have sudden, severe pain in your belly or pelvis. · You passed out (lost consciousness). · You have severe vaginal bleeding. Call your doctor now or seek immediate medical care if:  · You are dizzy or lightheaded, or you feel like you may faint. · You have new or increased pain in your belly or pelvis. · Your vaginal bleeding is getting worse. · You have increased pain in the vaginal area. · You have a fever. Watch closely for changes in your health, and be sure to contact your doctor if:  · You have new or worse vaginal discharge. · You do not get better as expected. Where can you learn more? Go to http://roland-agueda.info/. Enter E802 in the search box to learn more about \"Miscarriage: Care Instructions. \"  Current as of: March 16, 2017  Content Version: 11.3  © 1975-0907 [employer]. Care instructions adapted under license by [employer] (which disclaims liability or warranty for this information). If you have questions about a medical condition or this instruction, always ask your healthcare professional. [address] any warranty or liability for your use of this information.

## 2017-08-08 NOTE — PROGRESS NOTES
Chief Complaint   Patient presents with    Miscarriage     1. Have you been to the ER, urgent care clinic since your last visit? Hospitalized since your last visit? Yes. [facility]. 2. Have you seen or consulted any other health care providers outside of the [address] [place] [address] since your last visit? Include any pap smears or colon screening.  No

## 2017-08-08 NOTE — MR AVS SNAPSHOT
Visit Information Date & Time Provider Department Dept. Phone Encounter #  
 8/8/2017  4:00 PM Scott Onofre MD [address] [place] [address] [phone] [id number] Follow-up Instructions Return if symptoms worsen or fail to improve. Upcoming Health Maintenance Date Due DTaP/Tdap/Td series (1 - Tdap) 9/2/2004 INFLUENZA AGE 9 TO ADULT 8/1/2017 PAP AKA CERVICAL CYTOLOGY 7/3/2020 Allergies as of 8/8/2017  Review Complete On: 8/8/2017 By: Kemal Lewis LPN No Known Allergies Current Immunizations  Reviewed on 7/3/2017 No immunizations on file. Not reviewed this visit You Were Diagnosed With   
  
 Codes Comments Miscarriage    -  Primary ICD-10-CM: O36.9 ICD-9-CM: 634.90 Depression screen     ICD-10-CM: Z13.89 ICD-9-CM: V79.0 Vitals BP Pulse Temp Resp Height(growth percentile) Weight(growth percentile) 104/62 66 98.5 °F (36.9 °C) (Oral) 16 5' 5\" (1.651 m) 140 lb (63.5 kg) LMP SpO2 Breastfeeding? BMI OB Status Smoking Status 05/28/2017 100% Unknown 23.3 kg/m2 Recent pregnancy Never Smoker BMI and BSA Data Body Mass Index Body Surface Area  
 23.3 kg/m 2 1.71 m 2 Preferred Pharmacy Pharmacy Name Phone [address] Your Updated Medication List  
  
   
This list is accurate as of: 8/8/17  5:10 PM.  Always use your most recent med list.  
  
  
  
  
 CLARITIN 10 mg tablet Generic drug:  loratadine Take 10 mg by mouth. fluticasone 50 mcg/actuation nasal spray Commonly known as:  Arlys Pock 2 Sprays by Both Nostrils route daily. prenatal vit-calcium-iron-fa 27 mg iron- 1 mg Tab Commonly known as:  PRENATAL PLUS with CALCIUM Take 1 Tab by mouth daily. pseudoephedrine 30 mg tablet Commonly known as:  SUDAFED Take 1 Tab by mouth every six (6) hours as needed for Congestion. We Performed the Following BEHAV ASSMT W/SCORE & DOCD/STAND INSTRUMENT [id number] CPT(R)] BETA HCG, QT [id number] CPT(R)] Follow-up Instructions Return if symptoms worsen or fail to improve. Patient Instructions Miscarriage: Care Instructions Your Care Instructions The loss of a pregnancy can be very hard. You may wonder why it happened or blame yourself. Miscarriages are common and are not caused by exercise, stress, or sex. Most happen because the fertilized egg in the uterus does not develop normally. There is no treatment that can stop a miscarriage. As long as you do not have heavy blood loss, fever, weakness, or other signs of infection, you can let a miscarriage follow its own course. This can take several days. Your body will recover over the next several weeks. Having a miscarriage does not mean you cannot have a normal pregnancy in the future. The doctor has checked you carefully, but problems can develop later. If you notice any problems or new symptoms, get medical treatment right away. Follow-up care is a key part of your treatment and safety. Be sure to make and go to all appointments, and call your doctor if you are having problems. It's also a good idea to know your test results and keep a list of the medicines you take. How can you care for yourself at home? · You will probably have some vaginal bleeding for 1 to 2 weeks. It may be similar to or slightly heavier than a normal period. The bleeding should get lighter after a week. Use pads instead of tampons. You may use tampons during your next period, which should start in 3 to 6 weeks. · Take an over-the-counter pain medicine, such as acetaminophen (Tylenol), ibuprofen (Advil, Motrin), or naproxen (Aleve) for cramps. Read and follow all instructions on the label. You may have cramps for several days after the miscarriage.  
· Do not take two or more pain medicines at the same time unless the doctor told you to. Many pain medicines have acetaminophen, which is Tylenol. Too much acetaminophen (Tylenol) can be harmful. · Use a clear container to save any tissue that you pass. Take it to your doctor's office as soon as you can. · Do not have sex until the bleeding stops. · You may return to your normal activities if you feel well enough to do so. But you should avoid heavy exercise until the bleeding stops. · If you plan to get pregnant again, check with your doctor. Most doctors suggest waiting until you have had at least one normal period before you try to get pregnant. · If you do not want to get pregnant, ask your doctor about birth control. You can get pregnant again before your next period starts if you are not using birth control. · You may be low in iron because of blood loss. Eat a balanced diet that is high in iron and vitamin C. Foods rich in iron include red meat, shellfish, eggs, beans, and leafy green vegetables. Foods high in vitamin C include citrus fruits, tomatoes, and broccoli. Talk to your doctor about whether you need to take iron pills or a multivitamin. · The loss of a pregnancy can be very hard. You may wonder why it happened and blame yourself. Talking to family members, friends, a counselor, or your doctor may help you cope with your loss. When should you call for help? Call 911 anytime you think you may need emergency care. For example, call if: 
· You have sudden, severe pain in your belly or pelvis. · You passed out (lost consciousness). · You have severe vaginal bleeding. Call your doctor now or seek immediate medical care if: 
· You are dizzy or lightheaded, or you feel like you may faint. · You have new or increased pain in your belly or pelvis. · Your vaginal bleeding is getting worse. · You have increased pain in the vaginal area. · You have a fever. Watch closely for changes in your health, and be sure to contact your doctor if: · You have new or worse vaginal discharge. · You do not get better as expected. Where can you learn more? Go to http://roland-agueda.info/. Enter E802 in the search box to learn more about \"Miscarriage: Care Instructions. \" Current as of: March 16, 2017 Content Version: 11.3 © 1112-9828 [employer]. Care instructions adapted under license by [employer] (which disclaims liability or warranty for this information). If you have questions about a medical condition or this instruction, always ask your healthcare professional. [address] any warranty or liability for your use of this information. Introducing [facility]! Avelina Almanzar introduces SixthEye patient portal. Now you can access parts of your medical record, email your doctor's office, and request medication refills online. 1. In your internet browser, go to [email or web address] 2. Click on the First Time User? Click Here link in the Sign In box. You will see the New Member Sign Up page. 3. Enter your SixthEye Access Code exactly as it appears below. You will not need to use this code after youve completed the sign-up process. If you do not sign up before the expiration date, you must request a new code. · SixthEye Access Code: [email or web address] Expires: 10/1/2017  4:02 PM 
 
4. Enter the last four digits of your Social Security Number (xxxx) and Date of Birth (mm/dd/yyyy) as indicated and click Submit. You will be taken to the next sign-up page. 5. Create a GigaLogixt ID. This will be your SixthEye login ID and cannot be changed, so think of one that is secure and easy to remember. 6. Create a SixthEye password. You can change your password at any time. 7. Enter your Password Reset Question and Answer. This can be used at a later time if you forget your password. 8. Enter your e-mail address.  You will receive e-mail notification when new information is available in Urban [employer]. 9. Click Sign Up. You can now view and download portions of your medical record. 10. Click the Download Summary menu link to download a portable copy of your medical information. If you have questions, please visit the Frequently Asked Questions section of the [employer] website. Remember, [employer] is NOT to be used for urgent needs. For medical emergencies, dial 911. Now available from your iPhone and Android! Please provide this summary of care documentation to your next provider. Your primary care clinician is listed as Janet Malik. If you have any questions after today's visit, please call [phone].

## 2017-08-09 ENCOUNTER — TELEPHONE (OUTPATIENT)
Dept: FAMILY MEDICINE CLINIC | Age: 32
End: 2017-08-09

## 2017-08-09 ENCOUNTER — DOCUMENTATION ONLY (OUTPATIENT)
Dept: PSYCHIATRY | Age: 32
End: 2017-08-09

## 2017-08-09 LAB — HCG INTACT+B SERPL-ACNC: 455 MIU/ML

## 2017-08-09 NOTE — PROGRESS NOTES
Called [facility] office to make an appointment for [address]. The office does not any openings but the nurse will talk to Dr. Shahriar Canales and will try to make an appointment today. She will call back. 8:44 AM  8/9/2017  Fernando Berger MD      Addendum:  The pt is scheduled for the [address] on Friday 8/11/2017 at 2.15 pm with [facility].

## 2017-08-09 NOTE — TELEPHONE ENCOUNTER
Patient called regarding her appointment with the  center.  She is currently scheduled for Friday but would like to know if it can wait until Monday

## 2017-08-09 NOTE — TELEPHONE ENCOUNTER
Spoke with Alphonse who stated pt could be seen today at 10am.  States she will call the pt to inform her.

## 2017-08-09 NOTE — TELEPHONE ENCOUNTER
Nahomy from the  center returned a call to this office per Dr. Sandra Qureshi for the patient to be seen there. Nurse took the call.

## 2017-08-09 NOTE — TELEPHONE ENCOUNTER
Spoke with Dr. Luke Christianson who states that it needs to be done Friday and should not wait until Monday

## 2017-08-11 ENCOUNTER — HOSPITAL ENCOUNTER (OUTPATIENT)
Dept: PERINATAL CARE | Age: 32
Discharge: HOME | End: 2017-08-11
Attending: OBSTETRICS & GYNECOLOGY
Payer: COMMERCIAL

## 2017-08-11 PROCEDURE — 76830 TRANSVAGINAL US NON-OB: CPT | Performed by: OBSTETRICS & GYNECOLOGY

## 2017-08-13 NOTE — PROGRESS NOTES
Pt referred to [facility] for repeat ultrasound as beta hCGs have not trended as quickly as expected    Dx:   Spontaneous     Plan:  Need to follow beta hCGs to zero  Repeat beta hCG in 4 weeks

## 2017-08-15 NOTE — PROGRESS NOTES
Result forwarded to Dr. Misael Lunsford. Dr. Smith Common aware as well. I am covering for Dr. Stephania French while she is on vacation.

## 2017-08-16 ENCOUNTER — TELEPHONE (OUTPATIENT)
Dept: FAMILY MEDICINE | Age: 32
End: 2017-08-16

## 2017-12-01 ENCOUNTER — WALK IN (OUTPATIENT)
Dept: URGENT CARE | Age: 32
End: 2017-12-01

## 2017-12-01 VITALS
TEMPERATURE: 97.6 F | RESPIRATION RATE: 16 BRPM | SYSTOLIC BLOOD PRESSURE: 97 MMHG | DIASTOLIC BLOOD PRESSURE: 66 MMHG | HEART RATE: 78 BPM | OXYGEN SATURATION: 98 %

## 2017-12-01 DIAGNOSIS — N30.00 ACUTE CYSTITIS WITHOUT HEMATURIA: ICD-10-CM

## 2017-12-01 DIAGNOSIS — R30.0 DYSURIA: Primary | ICD-10-CM

## 2017-12-01 LAB
APPEARANCE UR: NORMAL
BILIRUB UR QL: NEGATIVE
COLOR UR: YELLOW
GLUCOSE UR-MCNC: NEGATIVE MG/DL
HGB UR QL: NORMAL
KETONES UR-MCNC: NEGATIVE MG/DL
LEUKOCYTE ESTERASE UR QL STRIP: NORMAL
NITRITE UR QL: POSITIVE
PH UR: 6.5 [PH]
PROT UR QL: NEGATIVE
SP GR UR: 1.02
SPECIMEN SOURCE: NORMAL
UROBILINOGEN UR QL: 0.2

## 2017-12-01 PROCEDURE — 87186 SC STD MICRODIL/AGAR DIL: CPT | Performed by: INTERNAL MEDICINE

## 2017-12-01 PROCEDURE — 87086 URINE CULTURE/COLONY COUNT: CPT | Performed by: INTERNAL MEDICINE

## 2017-12-01 PROCEDURE — 81002 URINALYSIS NONAUTO W/O SCOPE: CPT | Performed by: INTERNAL MEDICINE

## 2017-12-01 PROCEDURE — 87088 URINE BACTERIA CULTURE: CPT | Performed by: INTERNAL MEDICINE

## 2017-12-01 PROCEDURE — 99213 OFFICE O/P EST LOW 20 MIN: CPT | Performed by: INTERNAL MEDICINE

## 2017-12-01 RX ORDER — FLUCONAZOLE 200 MG/1
200 TABLET ORAL ONCE
Qty: 1 TABLET | Refills: 0 | Status: SHIPPED | OUTPATIENT
Start: 2017-12-01 | End: 2017-12-01

## 2017-12-01 RX ORDER — SULFAMETHOXAZOLE AND TRIMETHOPRIM 800; 160 MG/1; MG/1
1 TABLET ORAL 2 TIMES DAILY
Qty: 20 TABLET | Refills: 0 | Status: SHIPPED | OUTPATIENT
Start: 2017-12-01 | End: 2017-12-06

## 2017-12-04 ENCOUNTER — TELEPHONE (OUTPATIENT)
Dept: URGENT CARE | Age: 32
End: 2017-12-04

## 2017-12-04 LAB
BACTERIA UR CULT: ABNORMAL
ORGANISM: ABNORMAL
REPORT STATUS (RPT): ABNORMAL
SPECIMEN SOURCE: ABNORMAL

## 2017-12-12 ENCOUNTER — TELEPHONE (OUTPATIENT)
Dept: FAMILY MEDICINE CLINIC | Age: 32
End: 2017-12-12

## 2017-12-12 DIAGNOSIS — O03.9 MISCARRIAGE: Primary | ICD-10-CM

## 2017-12-12 NOTE — TELEPHONE ENCOUNTER
I called the patient at [phone] to discuss the test results. Left a message to call back the clinic. The pt needs to come back to the clinic to draw the blood. Will need to see if total bHCG back to 0.  -The order for total HCG is placed.     4:24 PM  12/12/2017  Rosalie Segovia MD

## 2017-12-21 ENCOUNTER — LAB ONLY (OUTPATIENT)
Dept: FAMILY MEDICINE CLINIC | Age: 32
End: 2017-12-21

## 2017-12-21 DIAGNOSIS — O03.9 MISCARRIAGE: ICD-10-CM

## 2017-12-22 LAB — HCG INTACT+B SERPL-ACNC: <1 MIU/ML

## 2017-12-24 NOTE — PROGRESS NOTES
The pt draw the blood for bHCG after miscarriage. BHCG<1, no further testing is indicated.     8:29 AM  12/24/2017    Trip Fields MD

## 2017-12-29 ENCOUNTER — TELEPHONE (OUTPATIENT)
Dept: FAMILY MEDICINE CLINIC | Age: 32
End: 2017-12-29

## 2018-01-02 NOTE — TELEPHONE ENCOUNTER
I wanted to call patient about lab results and looks like the labs still need to be collected?  Does she need to come back for labwork

## 2018-01-02 NOTE — TELEPHONE ENCOUNTER
I called the patient at [phone] to discuss the test results.   Left a message stating that the labwork was negative and asked to call back the clinic.    4:13 PM  1/2/2018  David De La Fuente MD

## 2018-02-01 ENCOUNTER — OFFICE VISIT (OUTPATIENT)
Dept: FAMILY MEDICINE CLINIC | Age: 33
End: 2018-02-01

## 2018-02-01 VITALS
WEIGHT: 140.8 LBS | SYSTOLIC BLOOD PRESSURE: 114 MMHG | TEMPERATURE: 98.8 F | BODY MASS INDEX: 23.46 KG/M2 | HEIGHT: 65 IN | DIASTOLIC BLOOD PRESSURE: 76 MMHG | RESPIRATION RATE: 16 BRPM | OXYGEN SATURATION: 100 % | HEART RATE: 74 BPM

## 2018-02-01 DIAGNOSIS — J01.00 ACUTE NON-RECURRENT MAXILLARY SINUSITIS: Primary | ICD-10-CM

## 2018-02-01 DIAGNOSIS — J02.9 PHARYNGITIS, UNSPECIFIED ETIOLOGY: ICD-10-CM

## 2018-02-01 RX ORDER — LIDOCAINE HYDROCHLORIDE 20 MG/ML
SOLUTION OROPHARYNGEAL
Qty: 100 ML | Refills: 0 | Status: SHIPPED | OUTPATIENT
Start: 2018-02-01 | End: 2018-05-21

## 2018-02-01 NOTE — MR AVS SNAPSHOT
[address] 
[phone] Patient: Vitaly Coronado MRN: [id number] Visit Information Date & Time Provider Department Dept. Phone Encounter #  
 2/1/2018  7:00 PM Iglesia Peter, [facility] [phone] [id number] Follow-up Instructions Return if symptoms worsen or fail to improve. Upcoming Health Maintenance Date Due DTaP/Tdap/Td series (1 - Tdap) 9/2/2004 Influenza Age 5 to Adult 8/1/2017 PAP AKA CERVICAL CYTOLOGY 7/3/2020 Allergies as of 2/1/2018  Review Complete On: 2/1/2018 By: Grace Guaman No Known Allergies Current Immunizations  Reviewed on 7/3/2017 No immunizations on file. Not reviewed this visit You Were Diagnosed With   
  
 Codes Comments Acute non-recurrent maxillary sinusitis    -  Primary ICD-10-CM: J01.00 ICD-9-CM: 461.0 Pharyngitis, unspecified etiology     ICD-10-CM: J02.9 ICD-9-CM: 400 Vitals BP Pulse Temp Resp Height(growth percentile) Weight(growth percentile) 114/76 (BP 1 Location: Left arm, BP Patient Position: Sitting) 74 98.8 °F (37.1 °C) (Oral) 16 5' 5\" (1.651 m) 140 lb 12.8 oz (63.9 kg) LMP SpO2 BMI OB Status Smoking Status 01/08/2018 (Exact Date) 100% 23.43 kg/m2 Having regular periods Never Smoker Vitals History BMI and BSA Data Body Mass Index Body Surface Area  
 23.43 kg/m 2 1.71 m 2 Preferred Pharmacy Pharmacy Name Phone [facility], [address] [place] [address] Street [address] [place] [address] [phone] Your Updated Medication List  
  
   
This list is accurate as of: 2/1/18  7:43 PM.  Always use your most recent med list.  
  
  
  
  
 CLARITIN 10 mg tablet Generic drug:  loratadine Take 10 mg by mouth. fluticasone 50 mcg/actuation nasal spray Commonly known as:  Keira Zelayaa 2 Sprays by Both Nostrils route daily. lidocaine 2 % solution Commonly known as:  XYLOCAINE 15 mL gargled and may be swallowed no more often than every 3 hours as needed; MAX 8 doses in a 24-hour period  
  
 prenatal vit-calcium-iron-fa 27 mg iron- 1 mg Tab Commonly known as:  PRENATAL PLUS with CALCIUM Take 1 Tab by mouth daily. pseudoephedrine 30 mg tablet Commonly known as:  SUDAFED Take 1 Tab by mouth every six (6) hours as needed for Congestion. Prescriptions Sent to Pharmacy Refills  
 lidocaine (XYLOCAINE) 2 % solution 0 Sig: 15 mL gargled and may be swallowed no more often than every 3 hours as needed; MAX 8 doses in a 24-hour period Class: Normal  
 Pharmacy: [facility] [address], [address] [place] [address] #: [phone] Follow-up Instructions Return if symptoms worsen or fail to improve. Patient Instructions Sinusitis: Care Instructions Your Care Instructions Sinusitis is an infection of the lining of the sinus cavities in your head. Sinusitis often follows a cold. It causes pain and pressure in your head and face. In most cases, sinusitis gets better on its own in 1 to 2 weeks. But some mild symptoms may last for several weeks. Sometimes antibiotics are needed. Follow-up care is a key part of your treatment and safety. Be sure to make and go to all appointments, and call your doctor if you are having problems. It's also a good idea to know your test results and keep a list of the medicines you take. How can you care for yourself at home? · Take an over-the-counter pain medicine, such as acetaminophen (Tylenol), ibuprofen (Advil, Motrin), or naproxen (Aleve). Read and follow all instructions on the label. · If the doctor prescribed antibiotics, take them as directed. Do not stop taking them just because you feel better. You need to take the full course of antibiotics. · Be careful when taking over-the-counter cold or flu medicines and Tylenol at the same time. Many of these medicines have acetaminophen, which is Tylenol. Read the labels to make sure that you are not taking more than the recommended dose. Too much acetaminophen (Tylenol) can be harmful. · Breathe warm, moist air from a steamy shower, a hot bath, or a sink filled with hot water. Avoid cold, dry air. Using a humidifier in your home may help. Follow the directions for cleaning the machine. · Use saline (saltwater) nasal washes to help keep your nasal passages open and wash out mucus and bacteria. You can buy saline nose drops at a grocery store or drugstore. Or you can make your own at home by adding 1 teaspoon of salt and 1 teaspoon of baking soda to 2 cups of distilled water. If you make your own, fill a bulb syringe with the solution, insert the tip into your nostril, and squeeze gently. Lizzie Link your nose. · Put a hot, wet towel or a warm gel pack on your face 3 or 4 times a day for 5 to 10 minutes each time. · Try a decongestant nasal spray like oxymetazoline (Afrin). Do not use it for more than 3 days in a row. Using it for more than 3 days can make your congestion worse. When should you call for help? Call your doctor now or seek immediate medical care if: 
? · You have new or worse swelling or redness in your face or around your eyes. ? · You have a new or higher fever. ? Watch closely for changes in your health, and be sure to contact your doctor if: 
? · You have new or worse facial pain. ? · The mucus from your nose becomes thicker (like pus) or has new blood in it. ? · You are not getting better as expected. Where can you learn more? Go to http://roland-agueda.info/. Enter B150 in the search box to learn more about \"Sinusitis: Care Instructions. \" Current as of: May 12, 2017 Content Version: 11.4 © 2672-0215 [employer]wise, [employer].  Care instructions adapted under license by [address] (which disclaims liability or warranty for this information). If you have questions about a medical condition or this instruction, always ask your healthcare professional. [address] any warranty or liability for your use of this information. Sore Throat: Care Instructions Your Care Instructions Infection by bacteria or a virus causes most sore throats. Cigarette smoke, dry air, air pollution, allergies, and yelling can also cause a sore throat. Sore throats can be painful and annoying. Fortunately, most sore throats go away on their own. If you have a bacterial infection, your doctor may prescribe antibiotics. Follow-up care is a key part of your treatment and safety. Be sure to make and go to all appointments, and call your doctor if you are having problems. It's also a good idea to know your test results and keep a list of the medicines you take. How can you care for yourself at home? · If your doctor prescribed antibiotics, take them as directed. Do not stop taking them just because you feel better. You need to take the full course of antibiotics. · Gargle with warm salt water once an hour to help reduce swelling and relieve discomfort. Use 1 teaspoon of salt mixed in 1 cup of warm water. · Take an over-the-counter pain medicine, such as acetaminophen (Tylenol), ibuprofen (Advil, Motrin), or naproxen (Aleve). Read and follow all instructions on the label. · Be careful when taking over-the-counter cold or flu medicines and Tylenol at the same time. Many of these medicines have acetaminophen, which is Tylenol. Read the labels to make sure that you are not taking more than the recommended dose. Too much acetaminophen (Tylenol) can be harmful. · Drink plenty of fluids. Fluids may help soothe an irritated throat. Hot fluids, such as tea or soup, may help decrease throat pain. · Use over-the-counter throat lozenges to soothe pain. Regular cough drops or hard candy may also help. These should not be given to young children because of the risk of choking. · Do not smoke or allow others to smoke around you. If you need help quitting, talk to your doctor about stop-smoking programs and medicines. These can increase your chances of quitting for good. · Use a vaporizer or humidifier to add moisture to your bedroom. Follow the directions for cleaning the machine. When should you call for help? Call your doctor now or seek immediate medical care if: 
? · You have new or worse trouble swallowing. ? · Your sore throat gets much worse on one side. ? Watch closely for changes in your health, and be sure to contact your doctor if you do not get better as expected. Where can you learn more? Go to http://roland-agueda.info/. Enter 062 441 80 [id number] in the search box to learn more about \"Sore Throat: Care Instructions. \" Current as of: May 12, 2017 Content Version: 11.4 © 9818-5762 [employer]. Care instructions adapted under license by [employer] (which disclaims liability or warranty for this information). If you have questions about a medical condition or this instruction, always ask your healthcare professional. [address] any warranty or liability for your use of this information. Introducing [facility] & HEALTH SERVICES! [employer] introduces [employer] patient portal. Now you can access parts of your medical record, email your doctor's office, and request medication refills online. 1. In your internet browser, go to [email or web address] 2. Click on the First Time User? Click Here link in the Sign In box. You will see the New Member Sign Up page. 3. Enter your [employer] Access Code exactly as it appears below. You will not need to use this code after youve completed the sign-up process.  If you do not sign up before the expiration date, you must request a new code. · [employer] Access Code: [email or web address] Expires: 5/2/2018  7:18 PM 
 
4. Enter the last four digits of your Social Security Number (xxxx) and Date of Birth (mm/dd/yyyy) as indicated and click Submit. You will be taken to the next sign-up page. 5. Create a [employer] ID. This will be your [employer] login ID and cannot be changed, so think of one that is secure and easy to remember. 6. Create a [employer] password. You can change your password at any time. 7. Enter your Password Reset Question and Answer. This can be used at a later time if you forget your password. 8. Enter your e-mail address. You will receive e-mail notification when new information is available in [address] 9. Click Sign Up. You can now view and download portions of your medical record. 10. Click the Download Summary menu link to download a portable copy of your medical information. If you have questions, please visit the Frequently Asked Questions section of the [employer] website. Remember, [employer] is NOT to be used for urgent needs. For medical emergencies, dial 911. Now available from your iPhone and Android! Please provide this summary of care documentation to your next provider. Your primary care clinician is listed as Cindra Blind. If you have any questions after today's visit, please call [phone].

## 2018-02-02 NOTE — PROGRESS NOTES
Suella Roosevelt  27 y.o. female  1985  [address] [place] [address]  [id number]   [address]: Progress Note  Rom Sher MD       Encounter Date: 2/1/2018    Chief Complaint   Patient presents with    Sore Throat     sore throat and ear ache x 3-4 days. Headaches. No fever/chills. Congestion. History of Present Illness   Marquis Jaimes is a 27 y.o. female who presents to clinic today for 4 days of sore throat and bilateral ear ache. Associated with congestion. Denies fevers/chills. No known sick contacts. Talking OTC analgesics. Pain Scale: 8/10. Non Smoker  No known hx of asthma    Review of Systems   Review of Systems   Constitutional: Negative for chills and fever. HENT: Positive for congestion, ear pain, sinus pain and sore throat. Respiratory: Negative for cough, shortness of breath and wheezing. Cardiovascular: Negative. Skin: Negative. Neurological: Positive for headaches. Vitals/Objective:     Vitals:    02/01/18 1913   BP: 114/76   Pulse: 74   Resp: 16   Temp: 98.8 °F (37.1 °C)   TempSrc: Oral   SpO2: 100%   Weight: 140 lb 12.8 oz (63.9 kg)   Height: 5' 5\" (1.651 m)     Body mass index is 23.43 kg/(m^2). Physical Exam   Constitutional: She appears well-nourished. No distress. HENT:   Right Ear: Tympanic membrane and ear canal normal.   Left Ear: Tympanic membrane and ear canal normal.   Nose: Mucosal edema present. No rhinorrhea. Right sinus exhibits maxillary sinus tenderness. Right sinus exhibits no frontal sinus tenderness. Left sinus exhibits no maxillary sinus tenderness and no frontal sinus tenderness. Mouth/Throat: Posterior oropharyngeal erythema present. No oropharyngeal exudate or posterior oropharyngeal edema. Eyes: Right eye exhibits no discharge. Left eye exhibits no discharge. Cardiovascular: Normal rate, regular rhythm, normal heart sounds and intact distal pulses.   Exam reveals no gallop and no friction rub. No murmur heard. Pulmonary/Chest: Effort normal and breath sounds normal. No respiratory distress. She has no wheezes. Abdominal: Soft. Bowel sounds are normal.   Lymphadenopathy:     She has cervical adenopathy. Assessment and Plan:   1. Acute non-recurrent maxillary sinusitis  Likely viral. Discussed sx management    2. Pharyngitis, unspecified etiology  - lidocaine (XYLOCAINE) 2 % solution; 15 mL gargled and may be swallowed no more often than every 3 hours as needed; MAX 8 doses in a 24-hour period  Dispense: 100 mL; Refill: 0    I have discussed the diagnosis with the patient and the intended plan as seen in the above orders. she has expressed understanding. The patient has received an after-visit summary and questions were answered concerning future plans. I have discussed medication side effects and warnings with the patient as well. Follow-up Disposition:  Return if symptoms worsen or fail to improve. Electronically Signed: Nahun Palmer MD     History   Patients past medical, surgical and family histories were reviewed and updated. No past medical history on file. No past surgical history on file. Family History   Problem Relation Age of Onset    Cancer Mother      breast    Cancer Father      prostate     Social History     Social History    Marital status: UNKNOWN     Spouse name: N/A    Number of children: N/A    Years of education: N/A     Occupational History    Not on file.      Social History Main Topics    Smoking status: Never Smoker    Smokeless tobacco: Never Used    Alcohol use No    Drug use: No    Sexual activity: Not Currently     Partners: Male     Other Topics Concern    Not on file     Social History Narrative            Current Medications/Allergies     Current Outpatient Prescriptions   Medication Sig Dispense Refill    pseudoephedrine (SUDAFED) 30 mg tablet Take 1 Tab by mouth every six (6) hours as needed for Congestion. 30 Tab 0    prenatal vit-calcium-iron-fa (PRENATAL PLUS WITH CALCIUM) 27 mg iron- 1 mg tab Take 1 Tab by mouth daily. 30 Tab 11    loratadine (CLARITIN) 10 mg tablet Take 10 mg by mouth.  fluticasone (FLONASE) 50 mcg/actuation nasal spray 2 Sprays by Both Nostrils route daily.  1 Bottle 0     No Known Allergies

## 2018-02-02 NOTE — PATIENT INSTRUCTIONS
Sinusitis: Care Instructions  Your Care Instructions    Sinusitis is an infection of the lining of the sinus cavities in your head. Sinusitis often follows a cold. It causes pain and pressure in your head and face. In most cases, sinusitis gets better on its own in 1 to 2 weeks. But some mild symptoms may last for several weeks. Sometimes antibiotics are needed. Follow-up care is a key part of your treatment and safety. Be sure to make and go to all appointments, and call your doctor if you are having problems. It's also a good idea to know your test results and keep a list of the medicines you take. How can you care for yourself at home? · Take an over-the-counter pain medicine, such as acetaminophen (Tylenol), ibuprofen (Advil, Motrin), or naproxen (Aleve). Read and follow all instructions on the label. · If the doctor prescribed antibiotics, take them as directed. Do not stop taking them just because you feel better. You need to take the full course of antibiotics. · Be careful when taking over-the-counter cold or flu medicines and Tylenol at the same time. Many of these medicines have acetaminophen, which is Tylenol. Read the labels to make sure that you are not taking more than the recommended dose. Too much acetaminophen (Tylenol) can be harmful. · Breathe warm, moist air from a steamy shower, a hot bath, or a sink filled with hot water. Avoid cold, dry air. Using a humidifier in your home may help. Follow the directions for cleaning the machine. · Use saline (saltwater) nasal washes to help keep your nasal passages open and wash out mucus and bacteria. You can buy saline nose drops at a grocery store or drugstore. Or you can make your own at home by adding 1 teaspoon of salt and 1 teaspoon of baking soda to 2 cups of distilled water. If you make your own, fill a bulb syringe with the solution, insert the tip into your nostril, and squeeze gently. Faith Gusmanhs your nose.   · Put a hot, wet towel or a warm gel pack on your face 3 or 4 times a day for 5 to 10 minutes each time. · Try a decongestant nasal spray like oxymetazoline (Afrin). Do not use it for more than 3 days in a row. Using it for more than 3 days can make your congestion worse. When should you call for help? Call your doctor now or seek immediate medical care if:  ? · You have new or worse swelling or redness in your face or around your eyes. ? · You have a new or higher fever. ? Watch closely for changes in your health, and be sure to contact your doctor if:  ? · You have new or worse facial pain. ? · The mucus from your nose becomes thicker (like pus) or has new blood in it. ? · You are not getting better as expected. Where can you learn more? Go to http://roland-agueda.info/. Enter A534 in the search box to learn more about \"Sinusitis: Care Instructions. \"  Current as of: May 12, 2017  Content Version: 11.4  © 3121-8815 [employer]. Care instructions adapted under license by [employer] (which disclaims liability or warranty for this information). If you have questions about a medical condition or this instruction, always ask your healthcare professional. [place] [address] any warranty or liability for your use of this information. Sore Throat: Care Instructions  Your Care Instructions    Infection by bacteria or a virus causes most sore throats. Cigarette smoke, dry air, air pollution, allergies, and yelling can also cause a sore throat. Sore throats can be painful and annoying. Fortunately, most sore throats go away on their own. If you have a bacterial infection, your doctor may prescribe antibiotics. Follow-up care is a key part of your treatment and safety. Be sure to make and go to all appointments, and call your doctor if you are having problems. It's also a good idea to know your test results and keep a list of the medicines you take.   How can you care for yourself at home? · If your doctor prescribed antibiotics, take them as directed. Do not stop taking them just because you feel better. You need to take the full course of antibiotics. · Gargle with warm salt water once an hour to help reduce swelling and relieve discomfort. Use 1 teaspoon of salt mixed in 1 cup of warm water. · Take an over-the-counter pain medicine, such as acetaminophen (Tylenol), ibuprofen (Advil, Motrin), or naproxen (Aleve). Read and follow all instructions on the label. · Be careful when taking over-the-counter cold or flu medicines and Tylenol at the same time. Many of these medicines have acetaminophen, which is Tylenol. Read the labels to make sure that you are not taking more than the recommended dose. Too much acetaminophen (Tylenol) can be harmful. · Drink plenty of fluids. Fluids may help soothe an irritated throat. Hot fluids, such as tea or soup, may help decrease throat pain. · Use over-the-counter throat lozenges to soothe pain. Regular cough drops or hard candy may also help. These should not be given to young children because of the risk of choking. · Do not smoke or allow others to smoke around you. If you need help quitting, talk to your doctor about stop-smoking programs and medicines. These can increase your chances of quitting for good. · Use a vaporizer or humidifier to add moisture to your bedroom. Follow the directions for cleaning the machine. When should you call for help? Call your doctor now or seek immediate medical care if:  ? · You have new or worse trouble swallowing. ? · Your sore throat gets much worse on one side. ? Watch closely for changes in your health, and be sure to contact your doctor if you do not get better as expected. Where can you learn more? Go to http://roland-agueda.info/. Enter 062 441 80 [id number] in the search box to learn more about \"Sore Throat: Care Instructions. \"  Current as of:  May 12, 2017  Content Version: 11.4  © 7792-3324 [employer]. Care instructions adapted under license by [employer] (which disclaims liability or warranty for this information). If you have questions about a medical condition or this instruction, always ask your healthcare professional. [address] any warranty or liability for your use of this information.

## 2018-02-02 NOTE — PROGRESS NOTES
Chief Complaint   Patient presents with    Sore Throat     sore throat and ear ache x 3-4 days. Headaches. No fever/chills. Congestion. 1. Have you been to the ER, urgent care clinic since your last visit? Hospitalized since your last visit? No    2. Have you seen or consulted any other health care providers outside of the [address] [place] [address] since your last visit? Include any pap smears or colon screening.  No

## 2018-04-20 ENCOUNTER — TELEPHONE (OUTPATIENT)
Dept: FAMILY MEDICINE | Age: 33
End: 2018-04-20

## 2018-04-20 DIAGNOSIS — L70.0 SUPERFICIAL MIXED COMEDONAL AND INFLAMMATORY ACNE VULGARIS: Primary | ICD-10-CM

## 2018-04-20 RX ORDER — CLINDAMYCIN PHOSPHATE 11.9 MG/ML
SOLUTION TOPICAL 2 TIMES DAILY
Qty: 60 ML | Refills: 5 | Status: SHIPPED | OUTPATIENT
Start: 2018-04-20 | End: 2019-01-07 | Stop reason: SDUPTHER

## 2018-04-27 ENCOUNTER — OFFICE VISIT (OUTPATIENT)
Dept: FAMILY MEDICINE CLINIC | Age: 33
End: 2018-04-27

## 2018-04-27 VITALS
RESPIRATION RATE: 18 BRPM | TEMPERATURE: 98.3 F | WEIGHT: 143 LBS | HEART RATE: 88 BPM | DIASTOLIC BLOOD PRESSURE: 55 MMHG | OXYGEN SATURATION: 98 % | SYSTOLIC BLOOD PRESSURE: 95 MMHG | BODY MASS INDEX: 23.82 KG/M2 | HEIGHT: 65 IN

## 2018-04-27 DIAGNOSIS — J30.1 SEASONAL ALLERGIC RHINITIS DUE TO POLLEN: Primary | ICD-10-CM

## 2018-04-27 RX ORDER — LORATADINE 10 MG/1
10 TABLET ORAL DAILY
Qty: 30 TAB | Refills: 0 | Status: SHIPPED | OUTPATIENT
Start: 2018-04-27 | End: 2018-05-21

## 2018-04-27 RX ORDER — FLUTICASONE PROPIONATE 50 MCG
2 SPRAY, SUSPENSION (ML) NASAL DAILY
Qty: 1 BOTTLE | Refills: 2 | Status: SHIPPED | OUTPATIENT
Start: 2018-04-27 | End: 2018-05-21

## 2018-04-27 NOTE — PROGRESS NOTES
Chief Complaint   Patient presents with    Allergies     headache,bilat ear pain,nasal congestionwatery eyes     1. Have you been to the ER, urgent care clinic since your last visit? Hospitalized since your last visit? No    2. Have you seen or consulted any other health care providers outside of the [address] [place] [address] since your last visit? Include any pap smears or colon screening.  No

## 2018-04-27 NOTE — MR AVS SNAPSHOT
[address] [phone] Patient: Chong Godinez MRN: [id number] TPF:[birth year] Visit Information Date & Time Provider Department Dept. Phone Encounter #  
 4/27/2018 10:20 AM Darlyn Perales MD [facility] [phone] [id number] Upcoming Health Maintenance Date Due DTaP/Tdap/Td series (1 - Tdap) 9/2/2004 Influenza Age 5 to Adult 8/1/2018 PAP AKA CERVICAL CYTOLOGY 7/3/2020 Allergies as of 4/27/2018  Review Complete On: 4/27/2018 By: Crystal Minaya MD  
 No Known Allergies Current Immunizations  Reviewed on 7/3/2017 No immunizations on file. Not reviewed this visit You Were Diagnosed With   
  
 Codes Comments Seasonal allergic rhinitis due to pollen    -  Primary ICD-10-CM: J30.1 ICD-9-CM: 477.0 Vitals BP Pulse Temp Resp Height(growth percentile) Weight(growth percentile) 95/55 (BP 1 Location: Right arm, BP Patient Position: Sitting) 88 98.3 °F (36.8 °C) (Oral) 18 5' 5\" (1.651 m) 143 lb (64.9 kg) LMP SpO2 Breastfeeding? BMI OB Status Smoking Status 04/26/2018 98% No 23.8 kg/m2 Having regular periods Never Smoker Vitals History BMI and BSA Data Body Mass Index Body Surface Area  
 23.8 kg/m 2 1.73 m 2 Preferred Pharmacy Pharmacy Name Phone [facility], [address] [place] [address] [phone] Your Updated Medication List  
  
   
This list is accurate as of 4/27/18 10:59 AM.  Always use your most recent med list.  
  
  
  
  
 fluticasone 50 mcg/actuation nasal spray Commonly known as:  Tony Calk 2 Sprays by Both Nostrils route daily. lidocaine 2 % solution Commonly known as:  XYLOCAINE 15 mL gargled and may be swallowed no more often than every 3 hours as needed; MAX 8 doses in a 24-hour period  
  
 loratadine 10 mg tablet Commonly known as:  Veryl Bleak Take 1 Tab by mouth daily. prenatal vit-calcium-iron-fa 27 mg iron- 1 mg Tab Commonly known as:  PRENATAL PLUS with CALCIUM Take 1 Tab by mouth daily. pseudoephedrine 30 mg tablet Commonly known as:  SUDAFED Take 1 Tab by mouth every six (6) hours as needed for Congestion. Prescriptions Sent to Pharmacy Refills  
 fluticasone (FLONASE) 50 mcg/actuation nasal spray 2 Si Sprays by Both Nostrils route daily. Class: Normal  
 Pharmacy: [facility] [address], [address] Ph #: [phone] Route: Both Nostrils  
 loratadine (CLARITIN) 10 mg tablet 0 Sig: Take 1 Tab by mouth daily. Class: Normal  
 Pharmacy: [facility] [address], [address] Ph #: [phone] Route: Oral  
  
Patient Instructions Seasonal Allergies: Care Instructions Your Care Instructions Allergies occur when your body's defense system (immune system) overreacts to certain substances. The immune system treats a harmless substance as if it were a harmful germ or virus. Many things can cause this to happen. Examples include pollens, medicine, food, dust, animal dander, and mold. Your allergies are seasonal if you have symptoms just at certain times of the year. In that case, you are probably allergic to pollens from certain trees, grasses, or weeds. Allergies can be mild or severe. Over-the-counter allergy medicine may help with some symptoms. Read and follow all instructions on the label. Managing your allergies is an important part of staying healthy. Your doctor may suggest that you have tests to help find the cause of your allergies. When you know what things trigger your symptoms, you can avoid them. This can prevent allergy symptoms and other health problems. In some cases, immunotherapy might help.  For this treatment, you get shots or use pills that have a small amount of certain allergens in them. Your body \"gets used to\" the allergen, so you react less to it over time. This kind of treatment may help prevent or reduce some allergy symptoms. Follow-up care is a key part of your treatment and safety. Be sure to make and go to all appointments, and call your doctor if you are having problems. It's also a good idea to know your test results and keep a list of the medicines you take. How can you care for yourself at home? · Be safe with medicines. Take your medicines exactly as prescribed. Call your doctor if you think you are having a problem with your medicine. · During your allergy season, keep windows closed. If you need to use air-conditioning, change or clean all filters every month. Take a shower and change your clothes after you have been outside. · Stay inside when pollen counts are high. Vacuum once or twice a week. Use a vacuum  with a HEPA filter or a double-thickness filter. When should you call for help? Give an epinephrine shot if: 
? · You think you are having a severe allergic reaction. ? After giving an epinephrine shot, call 911, even if you feel better. ?Call 911 if: 
? · You have symptoms of a severe allergic reaction. These may include: 
¨ Sudden raised, red areas (hives) all over your body. ¨ Swelling of the throat, mouth, lips, or tongue. ¨ Trouble breathing. ¨ Passing out (losing consciousness). Or you may feel very lightheaded or suddenly feel weak, confused, or restless. ? · You have been given an epinephrine shot, even if you feel better. ?Call your doctor now or seek immediate medical care if: 
? · You have symptoms of an allergic reaction, such as: ¨ A rash or hives (raised, red areas on the skin). ¨ Itching. ¨ Swelling. ¨ Belly pain, nausea, or vomiting. ? Watch closely for changes in your health, and be sure to contact your doctor if: 
? · You do not get better as expected. Where can you learn more? Go to http://roland-agueda.info/. Enter J912 in the search box to learn more about \"Seasonal Allergies: Care Instructions. \" Current as of: September 29, 2016 Content Version: 11.4 © 3304-7438 [employer] Incorporated. Care instructions adapted under license by [employer] [place] (which disclaims liability or warranty for this information). If you have questions about a medical condition or this instruction, always ask your healthcare professional. [address] any warranty or liability for your use of this information. Introducing [facility]! Amadeo Godinez introduces Lumatic patient portal. Now you can access parts of your medical record, email your doctor's office, and request medication refills online. 1. In your internet browser, go to [email or web address] 2. Click on the First Time User? Click Here link in the Sign In box. You will see the New Member Sign Up page. 3. Enter your Lumatic Access Code exactly as it appears below. You will not need to use this code after youve completed the sign-up process. If you do not sign up before the expiration date, you must request a new code. · Lumatic Access Code: [email or web address] Expires: 5/2/2018  8:18 PM 
 
4. Enter the last four digits of your Social Security Number (xxxx) and Date of Birth (mm/dd/yyyy) as indicated and click Submit. You will be taken to the next sign-up page. 5. Create a DGITt ID. This will be your Lumatic login ID and cannot be changed, so think of one that is secure and easy to remember. 6. Create a Lumatic password. You can change your password at any time. 7. Enter your Password Reset Question and Answer. This can be used at a later time if you forget your password. 8. Enter your e-mail address. You will receive e-mail notification when new information is available in [address] 9. Click Sign Up. You can now view and download portions of your medical record. 10. Click the Download Summary menu link to download a portable copy of your medical information. If you have questions, please visit the Frequently Asked Questions section of the [employer] website. Remember, [employer] is NOT to be used for urgent needs. For medical emergencies, dial 911. Now available from your iPhone and Android! Please provide this summary of care documentation to your next provider. Your primary care clinician is listed as Christ Kanner. If you have any questions after today's visit, please call [phone].

## 2018-04-27 NOTE — LETTER
NOTIFICATION RETURN TO WORK / SCHOOL 
 
4/27/2018 11:02 AM 
 
Ms. Alonzo Graf [address] [address] To Whom It May Concern: 
 
Alonzo Graf is currently under the care of [email or web address]. Patient had an appointment today, 04/27/18 at 10:20am. 
 
If there are questions or concerns please have the patient contact our office.  
 
 
 
Sincerely, 
 
 
Sandy Green MD

## 2018-04-27 NOTE — PATIENT INSTRUCTIONS
Seasonal Allergies: Care Instructions  Your Care Instructions  Allergies occur when your body's defense system (immune system) overreacts to certain substances. The immune system treats a harmless substance as if it were a harmful germ or virus. Many things can cause this to happen. Examples include pollens, medicine, food, dust, animal dander, and mold. Your allergies are seasonal if you have symptoms just at certain times of the year. In that case, you are probably allergic to pollens from certain trees, grasses, or weeds. Allergies can be mild or severe. Over-the-counter allergy medicine may help with some symptoms. Read and follow all instructions on the label. Managing your allergies is an important part of staying healthy. Your doctor may suggest that you have tests to help find the cause of your allergies. When you know what things trigger your symptoms, you can avoid them. This can prevent allergy symptoms and other health problems. In some cases, immunotherapy might help. For this treatment, you get shots or use pills that have a small amount of certain allergens in them. Your body \"gets used to\" the allergen, so you react less to it over time. This kind of treatment may help prevent or reduce some allergy symptoms. Follow-up care is a key part of your treatment and safety. Be sure to make and go to all appointments, and call your doctor if you are having problems. It's also a good idea to know your test results and keep a list of the medicines you take. How can you care for yourself at home? · Be safe with medicines. Take your medicines exactly as prescribed. Call your doctor if you think you are having a problem with your medicine. · During your allergy season, keep windows closed. If you need to use air-conditioning, change or clean all filters every month. Take a shower and change your clothes after you have been outside. · Stay inside when pollen counts are high.  Vacuum once or twice a week. Use a vacuum  with a HEPA filter or a double-thickness filter. When should you call for help? Give an epinephrine shot if:  ? · You think you are having a severe allergic reaction. ? After giving an epinephrine shot, call 911, even if you feel better. ?Call 911 if:  ? · You have symptoms of a severe allergic reaction. These may include:  ¨ Sudden raised, red areas (hives) all over your body. ¨ Swelling of the throat, mouth, lips, or tongue. ¨ Trouble breathing. ¨ Passing out (losing consciousness). Or you may feel very lightheaded or suddenly feel weak, confused, or restless. ? · You have been given an epinephrine shot, even if you feel better. ?Call your doctor now or seek immediate medical care if:  ? · You have symptoms of an allergic reaction, such as:  ¨ A rash or hives (raised, red areas on the skin). ¨ Itching. ¨ Swelling. ¨ Belly pain, nausea, or vomiting. ? Watch closely for changes in your health, and be sure to contact your doctor if:  ? · You do not get better as expected. Where can you learn more? Go to http://roland-agueda.info/. Enter J912 in the search box to learn more about \"Seasonal Allergies: Care Instructions. \"  Current as of: September 29, 2016  Content Version: 11.4  © 9443-2171 [employer]. Care instructions adapted under license by [employer] (which disclaims liability or warranty for this information). If you have questions about a medical condition or this instruction, always ask your healthcare professional. [place] [address] any warranty or liability for your use of this information.

## 2018-04-27 NOTE — PROGRESS NOTES
HPI     Chief Complaint   Patient presents with    Allergies     headache,bilat ear pain,nasal congestion watery eyes     She is a 27 y.o. female who presents for sinus congestion. Present for about 2-3 months but worsening. She now has a headache, watery eyes, and ear fullness. No fever or chills. Has not tried any antihistamines or nasal sprays. Was on sudafed before but no longer taking this. Denies sore throat or cough. Review of Systems   Constitutional: Negative for chills and fever. HENT: Positive for congestion, ear pain, postnasal drip and sinus pressure. Respiratory: Negative for cough and shortness of breath. Cardiovascular: Negative for chest pain. Gastrointestinal: Negative for abdominal pain. Musculoskeletal: Negative for back pain. Skin: Negative for rash. Neurological: Positive for headaches. Psychiatric/Behavioral: Negative for confusion. Physical Exam:  Visit Vitals    BP 95/55 (BP 1 Location: Right arm, BP Patient Position: Sitting)    Pulse 88    Temp 98.3 °F (36.8 °C) (Oral)    Resp 18    Ht 5' 5\" (1.651 m)    Wt 143 lb (64.9 kg)    LMP 04/26/2018    SpO2 98%    Breastfeeding No    BMI 23.8 kg/m2     Physical Exam   Constitutional: She is oriented to person, place, and time. She appears well-developed and well-nourished. HENT:   Head: Normocephalic and atraumatic. Right Ear: External ear normal.   Left Ear: External ear normal.   Nose: Nose normal.   Mouth/Throat: Oropharynx is clear and moist. No oropharyngeal exudate. Eyes: Conjunctivae are normal. Pupils are equal, round, and reactive to light. Cardiovascular: Normal rate and regular rhythm. No murmur heard. Pulmonary/Chest: Effort normal and breath sounds normal. She has no wheezes. She has no rales. Abdominal: Soft. Bowel sounds are normal. She exhibits no distension. There is no tenderness. Lymphadenopathy:     She has no cervical adenopathy.    Neurological: She is alert and oriented to person, place, and time. Skin: Skin is warm and dry. Vitals reviewed. Assessment / Plan   Diagnoses and all orders for this visit:    1. Seasonal allergic rhinitis due to pollen  -     fluticasone (FLONASE) 50 mcg/actuation nasal spray; 2 Sprays by Both Nostrils route daily. -     loratadine (CLARITIN) 10 mg tablet; Take 1 Tab by mouth daily. Follow-up Disposition:  Return if symptoms worsen or fail to improve. I have discussed the diagnosis with the patient and the intended plan as seen in the above orders. The patient has received an after-visit summary and questions were answered concerning future plans. I have discussed medication side effects and warnings with the patient as well.     Dwight Stein MD  Family Medicine Resident

## 2018-05-01 ENCOUNTER — OCC HEALTH (OUTPATIENT)
Dept: OCCUPATIONAL MEDICINE | Age: 33
End: 2018-05-01

## 2018-05-01 DIAGNOSIS — Z00.8 HEALTH EXAMINATION IN POPULATION SURVEYS: ICD-10-CM

## 2018-05-01 DIAGNOSIS — Z02.89 VISIT FOR OCCUPATIONAL HEALTH EXAMINATION: Primary | ICD-10-CM

## 2018-05-01 PROCEDURE — OH062 RESPIRATOR FIT TESTING: Performed by: PHYSICIAN ASSISTANT

## 2018-05-01 PROCEDURE — OH071 RESPIRATORY (PFT) QUESTIONNAIRE: Performed by: PHYSICIAN ASSISTANT

## 2018-05-02 ENCOUNTER — APPOINTMENT (OUTPATIENT)
Dept: OCCUPATIONAL MEDICINE | Age: 33
End: 2018-05-02

## 2018-05-09 ENCOUNTER — TELEPHONE (OUTPATIENT)
Dept: FAMILY MEDICINE | Age: 33
End: 2018-05-09

## 2018-05-09 DIAGNOSIS — L70.0 ACNE VULGARIS: Primary | Chronic | ICD-10-CM

## 2018-05-16 ENCOUNTER — TELEPHONE (OUTPATIENT)
Dept: FAMILY MEDICINE CLINIC | Age: 33
End: 2018-05-16

## 2018-05-16 NOTE — TELEPHONE ENCOUNTER
----- Message from Sheridan Stinson sent at 5/16/2018  4:59 PM EDT -----  Regarding: Dr Brigitte Mata  Would like to speak to her dr about her cycle.   Her number is [phone]

## 2018-05-18 NOTE — TELEPHONE ENCOUNTER
I called and spoke with patient and she indicated that she wanted to schedule appt. She is having transportation issues so she will call back and schedule appt.

## 2018-05-21 ENCOUNTER — OFFICE VISIT (OUTPATIENT)
Dept: FAMILY MEDICINE CLINIC | Age: 33
End: 2018-05-21

## 2018-05-21 VITALS
TEMPERATURE: 98 F | BODY MASS INDEX: 23.82 KG/M2 | OXYGEN SATURATION: 100 % | RESPIRATION RATE: 12 BRPM | DIASTOLIC BLOOD PRESSURE: 70 MMHG | HEART RATE: 67 BPM | WEIGHT: 143 LBS | SYSTOLIC BLOOD PRESSURE: 110 MMHG | HEIGHT: 65 IN

## 2018-05-21 DIAGNOSIS — R82.90 ABNORMAL URINALYSIS: ICD-10-CM

## 2018-05-21 DIAGNOSIS — N92.6 ABNORMAL MENSES: Primary | ICD-10-CM

## 2018-05-21 LAB
BILIRUB UR QL STRIP: ABNORMAL
GLUCOSE UR-MCNC: NEGATIVE MG/DL
HCG URINE, QL. (POC): NEGATIVE
KETONES P FAST UR STRIP-MCNC: ABNORMAL MG/DL
PH UR STRIP: 8.5 [PH] (ref 4.6–8)
PROT UR QL STRIP: ABNORMAL
SP GR UR STRIP: 1.02 (ref 1–1.03)
UA UROBILINOGEN AMB POC: ABNORMAL (ref 0.2–1)
URINALYSIS CLARITY POC: ABNORMAL
URINALYSIS COLOR POC: ABNORMAL
URINE BLOOD POC: ABNORMAL
URINE LEUKOCYTES POC: ABNORMAL
URINE NITRITES POC: POSITIVE
VALID INTERNAL CONTROL?: YES

## 2018-05-21 NOTE — PATIENT INSTRUCTIONS
-Seek immediate medical care if symptoms worsen or fail to improve and/or if you develop fever non-responsive to anti-pyretic (tylenol/ibuprofen), persistent nausea/vomiting, unable to eat/drink, dehydration, difficulty breathing, confusion/altered mental status. Abnormal Uterine Bleeding: Care Instructions  Your Care Instructions    Abnormal uterine bleeding (AUB) is irregular bleeding from the uterus that is longer or heavier than usual or does not occur at your regular time. Sometimes it is caused by changes in hormone levels. It can also be caused by growths in the uterus, such as fibroids or polyps. Sometimes a cause cannot be found. You may have heavy bleeding when you are not expecting your period. Your doctor may suggest a pregnancy test, if you think you are pregnant. Follow-up care is a key part of your treatment and safety. Be sure to make and go to all appointments, and call your doctor if you are having problems. It's also a good idea to know your test results and keep a list of the medicines you take. How can you care for yourself at home? · Be safe with medicines. Take pain medicines exactly as directed. ¨ If the doctor gave you a prescription medicine for pain, take it as prescribed. ¨ If you are not taking a prescription pain medicine, ask your doctor if you can take an over-the-counter medicine. · You may be low in iron because of blood loss. Eat a balanced diet that is high in iron and vitamin C. Foods rich in iron include red meat, shellfish, eggs, beans, and leafy green vegetables. Talk to your doctor about whether you need to take iron pills or a multivitamin. When should you call for help? Call 911 anytime you think you may need emergency care. For example, call if:  ? · You passed out (lost consciousness). ?Call your doctor now or seek immediate medical care if:  ? · You have new or worse belly or pelvic pain. ? · You have severe vaginal bleeding.    ? · You feel dizzy or lightheaded, or you feel like you may faint. ? Watch closely for changes in your health, and be sure to contact your doctor if:  ? · You think you may be pregnant. ? · Your bleeding gets worse. ? · You do not get better as expected. Where can you learn more? Go to http://roland-agueda.info/. Enter D621 in the search box to learn more about \"Abnormal Uterine Bleeding: Care Instructions. \"  Current as of: October 13, 2016  Content Version: 11.4  © 0739-2253 [employer]. Care instructions adapted under license by [employer] (which disclaims liability or warranty for this information). If you have questions about a medical condition or this instruction, always ask your healthcare professional. [address] any warranty or liability for your use of this information.

## 2018-05-21 NOTE — LETTER
NOTIFICATION RETURN TO WORK / SCHOOL 
 
5/21/2018 2:45 PM 
 
Ms. Kevin Maier [address] [facility] [address] To Whom It May Concern: 
 
Kevin Maier is currently under the care of [facility]. She will return to work/school on: 5/22/18 If there are questions or concerns please have the patient contact our office. Sincerely, Kirt Sharpe MD

## 2018-05-21 NOTE — PROGRESS NOTES
Chief Complaint:  Chief Complaint   Patient presents with    Irregular Menses     HPI  Aniyah Wright is a 27 y.o. female who presents for irregular menstrual bleeding.  night had abdominal pain. Monday pain subsided. Monday am () noticed that she was bleeding a lot. Bleeding slowed by the evening and was almost gone. Tuesday had bleeding during the day that slowed in the evening. Ever since Wednesday has been bleeding throughout the day like a period. Not having abdominal pain currently. Changing pad/tampons more regularly than normal, about every 2 hours. LMP prior was 18. Typically has periods about every 28 days. Usually lasts 5 days. Usually not heavy. . . Last pap smear: NILM, HPV negative in 2017. Sexually active: Yes. She is not using any form of contraception. Occasional dyspareunia (not new). History of STI?: No    SH: Tobacco, alcohol, or drug history?: Denies    ROS: Positive for items in bold, otherwise reviewed and negative:   Constitutional: headache, fever, fatigue     Cardiac: chest pain      Resp: cough or shortness of breath      GI: nausea, vomiting, constipation, diarrhea  : dysuria, hematuria    Allergies:   No Known Allergies    Meds:   No current outpatient prescriptions on file. PMH:  No known medical problems    Physical Exam:  Visit Vitals    /70    Pulse 67    Temp 98 °F (36.7 °C) (Oral)    Resp 12    Ht 5' 5\" (1.651 m)    Wt 143 lb (64.9 kg)    LMP 2018    SpO2 100%    BMI 23.8 kg/m2     Gen: No apparent distress. Alert and oriented and responds to all questions appropriately.    HEENT: Normocephalic, conjunctiva clear, extraocular eye movements intact, hearing grossly normal bilaterally, nose normal and patent, mucous membranes moist  Lungs: Respirations unlabored, clear to auscultation bilaterally  Cardio: Regular rate and rhythm without murmurs, rubs, or gallops   Abdomen: Normoactive bowel sounds, soft, nontender, nondistended  Pelvic: Exam chaperoned by Mir Rodriguez LPN. External genitalia normal without rashes or lesions. Pink and moist vaginal mucosa. Cervix without lesions. There is blood present from cervix. Uterus non tender and normal size. No adnexal masses or tenderness appreciated. Psych: Makes good eye contact. Appearance, behavior, and conversation appropriate with normal speech rate, fluency, content. UPT in-office negative    Results for orders placed or performed in visit on 05/21/18   AMB POC URINALYSIS DIP STICK AUTO W/O MICRO     Status: Abnormal   Result Value Ref Range Status    Color (UA POC) Red  Final    Clarity (UA POC) Turbid  Final    Glucose (UA POC) Negative Negative Final    Bilirubin (UA POC) 2+ Negative Final    Ketones (UA POC) 1+ Negative Final    Specific gravity (UA POC) 1.020 1.001 - 1.035 Final    Blood (UA POC) 3+ Negative Final    pH (UA POC) 8.5 (A) 4.6 - 8.0 Final    Protein (UA POC) 3+ Negative Final    Urobilinogen (UA POC) 4 mg/dL 0.2 - 1 Final    Nitrites (UA POC) Positive Negative Final    Leukocyte esterase (UA POC) 3+ Negative Final     Assessment and Plan:     Encounter Diagnoses:    ICD-10-CM ICD-9-CM    1. Abnormal menses N92.6 626.9 AMB POC URINE PREGNANCY TEST, VISUAL COLOR COMPARISON      AMB POC URINALYSIS DIP STICK AUTO W/O MICRO      CHLAMYDIA/GC PCR      CBC WITH AUTOMATED DIFF      CHLAMYDIA/GC PCR   2. Abnormal urinalysis R82.90 791.9 CULTURE, URINE     -Patient presents with onset of menstrual cycle that is sooner and heavier than expected. Possibly anovulatory cycle. No apparent structural abnormality on exam. UPT negative.  -Check cbc given h/o low hgb  -Check gc/chlamydia  -Culture abnormal urine  -Rtc 1 week. May consider TVUS and further lab evaluation if sxs not improving. Patient discussed with Dr. Barbara Schultz, Attending Physician.     Claire Dallas MD  Family Medicine Resident, PGY-3

## 2018-05-21 NOTE — MR AVS SNAPSHOT
[address] 
[phone] Patient: Janki Alegre MRN: [id number] GTM:[birth year] Visit Information Date & Time Provider Department Dept. Phone Encounter #  
 5/21/2018  1:35 PM Aquiles Rodriguez, [facility] [phone] [id number] Follow-up Instructions Return in about 1 week (around 5/28/2018) for Follow up of vaginal bleeding. Your Appointments 5/24/2018  3:20 PM  
PROCEDURE with Figueroa Regan MD  
[facility] Appt Note: well woman with labs [address] [employer] [address]  
[phone]  
  
   
 [address] [facility] [id number] Upcoming Health Maintenance Date Due DTaP/Tdap/Td series (1 - Tdap) 9/2/2004 Influenza Age 5 to Adult 8/1/2018 PAP AKA CERVICAL CYTOLOGY 7/3/2020 Allergies as of 5/21/2018  Review Complete On: 5/21/2018 By: Aquiles Rodriguez MD  
 No Known Allergies Current Immunizations  Reviewed on 7/3/2017 No immunizations on file. Not reviewed this visit You Were Diagnosed With   
  
 Codes Comments Abnormal menses    -  Primary ICD-10-CM: N92.6 ICD-9-CM: 626.9 Abnormal urinalysis     ICD-10-CM: R82.90 ICD-9-CM: 791.9 Vitals BP Pulse Temp Resp Height(growth percentile) Weight(growth percentile) 110/70 67 98 °F (36.7 °C) (Oral) 12 5' 5\" (1.651 m) 143 lb (64.9 kg) LMP SpO2 BMI OB Status Smoking Status 05/14/2018 100% 23.8 kg/m2 Having regular periods Never Smoker BMI and BSA Data Body Mass Index Body Surface Area  
 23.8 kg/m 2 1.73 m 2 Preferred Pharmacy Pharmacy Name Phone [facility], [address] [facility] [phone] Your Updated Medication List  
  
Notice  As of 5/21/2018  2:34 PM  
 You have not been prescribed any medications. We Performed the Following AMB POC URINALYSIS DIP STICK AUTO W/O MICRO [39824 CPT(R)] AMB POC URINE PREGNANCY TEST, VISUAL COLOR COMPARISON [59564 CPT(R)] CBC WITH AUTOMATED DIFF [48913 CPT(R)] CULTURE, URINE [id number] CPT(R)] Follow-up Instructions Return in about 1 week (around 5/28/2018) for Follow up of vaginal bleeding. To-Do List   
 05/21/2018 Lab:  CHLAMYDIA/GC PCR Patient Instructions   
-Seek immediate medical care if symptoms worsen or fail to improve and/or if you develop fever non-responsive to anti-pyretic (tylenol/ibuprofen), persistent nausea/vomiting, unable to eat/drink, dehydration, difficulty breathing, confusion/altered mental status. Abnormal Uterine Bleeding: Care Instructions Your Care Instructions Abnormal uterine bleeding (AUB) is irregular bleeding from the uterus that is longer or heavier than usual or does not occur at your regular time. Sometimes it is caused by changes in hormone levels. It can also be caused by growths in the uterus, such as fibroids or polyps. Sometimes a cause cannot be found. You may have heavy bleeding when you are not expecting your period. Your doctor may suggest a pregnancy test, if you think you are pregnant. Follow-up care is a key part of your treatment and safety. Be sure to make and go to all appointments, and call your doctor if you are having problems. It's also a good idea to know your test results and keep a list of the medicines you take. How can you care for yourself at home? · Be safe with medicines. Take pain medicines exactly as directed. ¨ If the doctor gave you a prescription medicine for pain, take it as prescribed. ¨ If you are not taking a prescription pain medicine, ask your doctor if you can take an over-the-counter medicine. · You may be low in iron because of blood loss. Eat a balanced diet that is high in iron and vitamin C.  Foods rich in iron include red meat, shellfish, eggs, beans, and leafy green vegetables. Talk to your doctor about whether you need to take iron pills or a multivitamin. When should you call for help? Call 911 anytime you think you may need emergency care. For example, call if: 
? · You passed out (lost consciousness). ?Call your doctor now or seek immediate medical care if: 
? · You have new or worse belly or pelvic pain. ? · You have severe vaginal bleeding. ? · You feel dizzy or lightheaded, or you feel like you may faint. ? Watch closely for changes in your health, and be sure to contact your doctor if: 
? · You think you may be pregnant. ? · Your bleeding gets worse. ? · You do not get better as expected. Where can you learn more? Go to http://roland-agueda.info/. Enter W583 in the search box to learn more about \"Abnormal Uterine Bleeding: Care Instructions. \" Current as of: October 13, 2016 Content Version: 11.4 © 6013-8387 [employer]. Care instructions adapted under license by [employer] (which disclaims liability or warranty for this information). If you have questions about a medical condition or this instruction, always ask your healthcare professional. [place] [address] any warranty or liability for your use of this information. Introducing [facility]! Shonda Walker introduces [employer] patient portal. Now you can access parts of your medical record, email your doctor's office, and request medication refills online. 1. In your internet browser, go to [email or web address]. [email or web address] 2. Click on the First Time User? Click Here link in the Sign In box. You will see the New Member Sign Up page. 3. Enter your [employer] Access Code exactly as it appears below. You will not need to use this code after youve completed the sign-up process. If you do not sign up before the expiration date, you must request a new code. · Active Endpoints Access Code: [email or web address] Expires: 8/19/2018  2:34 PM 
 
4. Enter the last four digits of your Social Security Number (xxxx) and Date of Birth (mm/dd/yyyy) as indicated and click Submit. You will be taken to the next sign-up page. 5. Create a Active Endpoints ID. This will be your Active Endpoints login ID and cannot be changed, so think of one that is secure and easy to remember. 6. Create a [employer] password. You can change your password at any time. 7. Enter your Password Reset Question and Answer. This can be used at a later time if you forget your password. 8. Enter your e-mail address. You will receive e-mail notification when new information is available in [address] 9. Click Sign Up. You can now view and download portions of your medical record. 10. Click the Download Summary menu link to download a portable copy of your medical information. If you have questions, please visit the Frequently Asked Questions section of the Active Endpoints website. Remember, [employer] Endpoints is NOT to be used for urgent needs. For medical emergencies, dial 911. Now available from your iPhone and Android! Please provide this summary of care documentation to your next provider. Your primary care clinician is listed as Christ Kanner. If you have any questions after today's visit, please call [phone].

## 2018-05-22 LAB
BACTERIA UR CULT: NO GROWTH
BASOPHILS # BLD AUTO: 0 X10E3/UL (ref 0–0.2)
BASOPHILS NFR BLD AUTO: 0 %
C TRACH RRNA SPEC QL NAA+PROBE: NEGATIVE
EOSINOPHIL # BLD AUTO: 0.1 X10E3/UL (ref 0–0.4)
EOSINOPHIL NFR BLD AUTO: 2 %
ERYTHROCYTE [DISTWIDTH] IN BLOOD BY AUTOMATED COUNT: 13.4 % (ref 12.3–15.4)
HCT VFR BLD AUTO: 37.5 % (ref 34–46.6)
HGB BLD-MCNC: 11.7 G/DL (ref 11.1–15.9)
IMM GRANULOCYTES # BLD: 0 X10E3/UL (ref 0–0.1)
IMM GRANULOCYTES NFR BLD: 0 %
LYMPHOCYTES # BLD AUTO: 1.9 X10E3/UL (ref 0.7–3.1)
LYMPHOCYTES NFR BLD AUTO: 33 %
MCH RBC QN AUTO: 25.9 PG (ref 26.6–33)
MCHC RBC AUTO-ENTMCNC: 31.2 G/DL (ref 31.5–35.7)
MCV RBC AUTO: 83 FL (ref 79–97)
MONOCYTES # BLD AUTO: 0.4 X10E3/UL (ref 0.1–0.9)
MONOCYTES NFR BLD AUTO: 7 %
N GONORRHOEA RRNA SPEC QL NAA+PROBE: NEGATIVE
NEUTROPHILS # BLD AUTO: 3.4 X10E3/UL (ref 1.4–7)
NEUTROPHILS NFR BLD AUTO: 58 %
PLATELET # BLD AUTO: 248 X10E3/UL (ref 150–379)
RBC # BLD AUTO: 4.51 X10E6/UL (ref 3.77–5.28)
WBC # BLD AUTO: 5.8 X10E3/UL (ref 3.4–10.8)

## 2018-06-01 ENCOUNTER — OFFICE VISIT (OUTPATIENT)
Dept: FAMILY MEDICINE CLINIC | Age: 33
End: 2018-06-01

## 2018-06-01 VITALS
HEIGHT: 65 IN | HEART RATE: 81 BPM | WEIGHT: 146 LBS | TEMPERATURE: 98.8 F | DIASTOLIC BLOOD PRESSURE: 62 MMHG | RESPIRATION RATE: 15 BRPM | SYSTOLIC BLOOD PRESSURE: 100 MMHG | OXYGEN SATURATION: 100 % | BODY MASS INDEX: 24.32 KG/M2

## 2018-06-01 DIAGNOSIS — R10.2 PELVIC PAIN: Primary | ICD-10-CM

## 2018-06-01 DIAGNOSIS — N94.6 DYSMENORRHEA: ICD-10-CM

## 2018-06-01 LAB
BILIRUB UR QL STRIP: NEGATIVE
GLUCOSE UR-MCNC: NEGATIVE MG/DL
HCG URINE, QL. (POC): NEGATIVE
KETONES P FAST UR STRIP-MCNC: NEGATIVE MG/DL
PH UR STRIP: 8.5 [PH] (ref 4.6–8)
PROT UR QL STRIP: NEGATIVE
SP GR UR STRIP: 1.01 (ref 1–1.03)
UA UROBILINOGEN AMB POC: ABNORMAL (ref 0.2–1)
URINALYSIS CLARITY POC: CLEAR
URINALYSIS COLOR POC: YELLOW
URINE BLOOD POC: NEGATIVE
URINE LEUKOCYTES POC: ABNORMAL
URINE NITRITES POC: NEGATIVE
VALID INTERNAL CONTROL?: YES

## 2018-06-01 NOTE — PROGRESS NOTES
Chief Complaint   Patient presents with    Irregular Menses     had period--2 weeks hiatus--then got period again     1. Have you been to the ER, urgent care clinic since your last visit? Hospitalized since your last visit? No    2. Have you seen or consulted any other health care providers outside of the [address] [place] [address] since your last visit? Include any pap smears or colon screening.  No     No bleeding today    Pelvic pain

## 2018-06-01 NOTE — PATIENT INSTRUCTIONS
Chronic Pelvic Pain: Care Instructions  Your Care Instructions    Pelvic pain in women is pain below the belly button. Chronic pelvic pain means you have had this pain for at least 6 months. The pain can range from a mild ache that comes and goes to a steady pain that makes it hard to sleep, work, or enjoy life. It can be hard to know what causes this pain. You may need a number of tests to find the cause. Some common causes include problems with your reproductive system and diseases of the urinary tract or bowel. Sometimes, chronic pelvic pain may be related to past or current physical or sexual abuse. But doctors can't always find the cause. This does not mean the pain is not real or that it is \"in your head. \" It is real pain, and you need to treat it. If your doctor finds the cause of the pain, you treat the cause. For example, if the cause is hormonal, you might need to take birth control pills. But if the tests don't show a cause, you can take steps to help with the pain. Follow-up care is a key part of your treatment and safety. Be sure to make and go to all appointments, and call your doctor if you are having problems. It's also a good idea to know your test results and keep a list of the medicines you take. How can you care for yourself at home? · Be safe with medicines. Read and follow all instructions on the label. ¨ If the doctor gave you a prescription medicine for pain, take it as prescribed. ¨ If you are not taking a prescription pain medicine, ask your doctor if you can take an over-the-counter medicine. · If you have back pain, lie down and elevate your legs by placing a pillow under your knees. When lying on your side, bring your knees up to your chest.  · Put a warm water bottle, a heating pad set on low, or a warm cloth on your belly. Or take a warm bath. Do not go to sleep with a heating pad on your skin. · Relax. Try meditation, yoga exercises, or breathing. · Exercise regularly. It improves blood flow and reduces pain. · Keep a diary. Track your symptoms, menstrual cycle, sexual activity, and physical activity. Also track stressful events or illnesses. This information can help your doctor find the cause or treat it. When should you call for help? Watch closely for changes in your health, and be sure to contact your doctor if:  ? · Your pain gets worse. ? · You do not get better as expected. Where can you learn more? Go to http://roland-agueda.info/. Enter Z960 in the search box to learn more about \"Chronic Pelvic Pain: Care Instructions. \"  Current as of: October 13, 2016  Content Version: 11.4  © 8391-1027 [employer]. Care instructions adapted under license by [employer] (which disclaims liability or warranty for this information). If you have questions about a medical condition or this instruction, always ask your healthcare professional. [address] any warranty or liability for your use of this information.

## 2018-06-01 NOTE — MR AVS SNAPSHOT
[address] 
[phone] Patient: Katie Valenzuela MRN: [id number] PKN:[id number] Visit Information Date & Time Provider Department Dept. Phone Encounter #  
 6/1/2018  3:40 PM Neema Briones MD [address] [place] [address] [phone] [id number] Follow-up Instructions Return in about 2 weeks (around 6/15/2018). Upcoming Health Maintenance Date Due DTaP/Tdap/Td series (1 - Tdap) 9/2/2004 Influenza Age 5 to Adult 8/1/2018 PAP AKA CERVICAL CYTOLOGY 7/3/2020 Allergies as of 6/1/2018  Review Complete On: 6/1/2018 By: Mitali Parish LPN No Known Allergies Current Immunizations  Reviewed on 7/3/2017 No immunizations on file. Not reviewed this visit You Were Diagnosed With   
  
 Codes Comments Pelvic pain    -  Primary ICD-10-CM: R10.2 ICD-9-CM: IQE2015 Dysmenorrhea     ICD-10-CM: N94.6 ICD-9-CM: 044. 3 Vitals BP Pulse Temp Resp Height(growth percentile) Weight(growth percentile) 100/62 81 98.8 °F (37.1 °C) (Oral) 15 5' 5\" (1.651 m) 146 lb (66.2 kg) LMP SpO2 BMI OB Status Smoking Status 05/14/2018 100% 24.3 kg/m2 Having regular periods Never Smoker Vitals History BMI and BSA Data Body Mass Index Body Surface Area  
 24.3 kg/m 2 1.74 m 2 Preferred Pharmacy Pharmacy Name Phone [address] [address] [place] [address] [phone] Your Updated Medication List  
  
Notice  As of 6/1/2018  4:16 PM  
 You have not been prescribed any medications. We Performed the Following AMB POC URINALYSIS DIP STICK AUTO W/O MICRO [43653 CPT(R)] AMB POC URINE PREGNANCY TEST, VISUAL COLOR COMPARISON [00587 CPT(R)] Follow-up Instructions Return in about 2 weeks (around 6/15/2018). To-Do List   
 06/08/2018 Imaging:  US PELV NON OBS  LTD Patient Instructions Chronic Pelvic Pain: Care Instructions Your Care Instructions Pelvic pain in women is pain below the belly button. Chronic pelvic pain means you have had this pain for at least 6 months. The pain can range from a mild ache that comes and goes to a steady pain that makes it hard to sleep, work, or enjoy life. It can be hard to know what causes this pain. You may need a number of tests to find the cause. Some common causes include problems with your reproductive system and diseases of the urinary tract or bowel. Sometimes, chronic pelvic pain may be related to past or current physical or sexual abuse. But doctors can't always find the cause. This does not mean the pain is not real or that it is \"in your head. \" It is real pain, and you need to treat it. If your doctor finds the cause of the pain, you treat the cause. For example, if the cause is hormonal, you might need to take birth control pills. But if the tests don't show a cause, you can take steps to help with the pain. Follow-up care is a key part of your treatment and safety. Be sure to make and go to all appointments, and call your doctor if you are having problems. It's also a good idea to know your test results and keep a list of the medicines you take. How can you care for yourself at home? · Be safe with medicines. Read and follow all instructions on the label. ¨ If the doctor gave you a prescription medicine for pain, take it as prescribed. ¨ If you are not taking a prescription pain medicine, ask your doctor if you can take an over-the-counter medicine. · If you have back pain, lie down and elevate your legs by placing a pillow under your knees. When lying on your side, bring your knees up to your chest. 
· Put a warm water bottle, a heating pad set on low, or a warm cloth on your belly. Or take a warm bath. Do not go to sleep with a heating pad on your skin. · Relax. Try meditation, yoga exercises, or breathing. · Exercise regularly. It improves blood flow and reduces pain. · Keep a diary. Track your symptoms, menstrual cycle, sexual activity, and physical activity. Also track stressful events or illnesses. This information can help your doctor find the cause or treat it. When should you call for help? Watch closely for changes in your health, and be sure to contact your doctor if: 
? · Your pain gets worse. ? · You do not get better as expected. Where can you learn more? Go to http://roland-agueda.info/. Enter F589 in the search box to learn more about \"Chronic Pelvic Pain: Care Instructions. \" Current as of: October 13, 2016 Content Version: 11.4 © 3668-7376 DossierView. Care instructions adapted under license by ChatLing[employer] (which disclaims liability or warranty for this information). If you have questions about a medical condition or this instruction, always ask your healthcare professional. [place] [address] any warranty or liability for your use of this information. Introducing [facility]! [employer] introduces [employer]ding [employer]ologist patient portal. Now you can access parts of your medical record, email your doctor's office, and request medication refills online. 1. In your internet browser, go to [email or web address] 2. Click on the First Time User? Click Here link in the Sign In box. You will see the New Member Sign Up page. 3. Enter your Budding [employer]ologist Access Code exactly as it appears below. You will not need to use this code after youve completed the sign-up process. If you do not sign up before the expiration date, you must request a new code. · Budding [employer]ologist Access Code: [email or web address] Expires: 8/19/2018  2:34 PM 
 
4. Enter the last four digits of your Social Security Number (xxxx) and Date of Birth (mm/dd/yyyy) as indicated and click Submit.  You will be taken to the next sign-up page. 5. Create a Consumr ID. This will be your Consumr login ID and cannot be changed, so think of one that is secure and easy to remember. 6. Create a Consumr password. You can change your password at any time. 7. Enter your Password Reset Question and Answer. This can be used at a later time if you forget your password. 8. Enter your e-mail address. You will receive e-mail notification when new information is available in [address]. 9. Click Sign Up. You can now view and download portions of your medical record. 10. Click the Download Summary menu link to download a portable copy of your medical information. If you have questions, please visit the Frequently Asked Questions section of the Consumr website. Remember, Consumr is NOT to be used for urgent needs. For medical emergencies, dial 911. Now available from your iPhone and Android! Please provide this summary of care documentation to your next provider. Your primary care clinician is listed as Pasquale Malone. If you have any questions after today's visit, please call [phone].

## 2018-06-01 NOTE — PROGRESS NOTES
Subjective:   Nemo Mackey is an 27 y.o. female who presents for evaluation of suprapubic pain. HPI  She reports having suprapubic pain for couple of months from now which recently got worse during the last LMP. LMP 4/26/2018 ( Normal), 2 weeks after she started to bleeding and was passing small clots. The bleeding lasted for 10 days with subsequent spotting x3 days. Last spotting 7 days ago. Before 4/26/18 the periods were regular, every 28-30 days and last x 3-4 days with normal flow. Since the spotting stopped she report improvement of suprapubic pain. Denies dysuria, vaginal discharge. She is having episodic dyspareunia which is chronic. No fever, no chills. No hx of STDs. [id number]  She is sexually active with her boyfriend. No contraception use. Allergies - reviewed:   No Known Allergies      Medications - reviewed:  No current outpatient prescriptions on file. No current facility-administered medications for this visit. Past Medical History - reviewed:  History reviewed. No pertinent past medical history. Past Surgical History - reviewed:  History reviewed. No pertinent surgical history. Social History - reviewed:  Social History     Social History    Marital status: UNKNOWN     Spouse name: N/A    Number of children: N/A    Years of education: N/A     Occupational History    Not on file. Social History Main Topics    Smoking status: Never Smoker    Smokeless tobacco: Never Used    Alcohol use No    Drug use: No    Sexual activity: Not Currently     Partners: Male     Other Topics Concern    Not on file     Social History Narrative         Review of Systems   CONSTITUTIONAL: denies fever. Denies chills. CARDIOVASCULAR: denies chest pain. Denies palpitations  RESPIRATORY: denies shortness of breath  GI:Denies change in stools. Denies hematochezia/melana  MUSCULOSKELETAL: denies joint pain. SKIN: denies rash.  Denies easy bruising          Objective: Visit Vitals    /62    Pulse 81    Temp 98.8 °F (37.1 °C) (Oral)    Resp 15    Ht 5' 5\" (1.651 m)    Wt 146 lb (66.2 kg)    LMP 05/14/2018    SpO2 100%    BMI 24.3 kg/m2       General appearance - alert, well appearing, and in no distress  Chest - clear to auscultation, no wheezes, rales or rhonchi, symmetric air entry  Heart - normal rate, regular rhythm, normal S1, S2, no murmurs, rubs, clicks or gallops  Abdomen - soft, nondistended, no masses or organomegaly, minimally tender in the suprapubic area, no guarding, no rebound tenderness. Skin - normal coloration and turgor, no rashes, no suspicious skin lesions noted    UPT negative. Assessment:   28 yo female who is here for:     ICD-10-CM ICD-9-CM    1. Pelvic pain R10.2 [id number] AMB POC URINE PREGNANCY TEST, VISUAL COLOR COMPARISON      AMB POC URINALYSIS DIP STICK AUTO W/O MICRO      US PELV NON OBS  LTD   2. Dysmenorrhea N94.6 625.3 US PELV NON OBS  LTD           Plan:   1. Pelvic pain. Unclear etiology. No signs of acute abdomen. CG/Chlamydia was checked 10 days ago when the pt experienced similar symptoms, no reason to recheck today. Ucx was checked as well, no growth ( 5/21/18). On the previous TVUS complex left ovarian cyst was found, possibly contributing to the current symptoms.  -Will repeat TVUS  2. Dysmenorrhea. Only 1 episode of irregular period. Discussed with the patient to watch for couple of months. If persistent irregular periods will consider treatment with OCP vs IUD. Follow-up Disposition:  Return in about 2 weeks (around 6/15/2018). I have discussed the diagnosis with the patient and the intended plan as seen in the above orders. The patient has received an after-visit summary and questions were answered concerning future plans. I have discussed medication side effects and warnings with the patient as well. Informed pt to return to the office if new symptoms arise.     The patient was discuss with Dr. Faheem Sorensen (the attending physician)    Nancy Glaser MD  Family Medicine Resident, PGY-2

## 2018-06-19 ENCOUNTER — HOSPITAL ENCOUNTER (OUTPATIENT)
Dept: ULTRASOUND IMAGING | Age: 33
Discharge: HOME | End: 2018-06-19
Attending: FAMILY MEDICINE
Payer: COMMERCIAL

## 2018-06-19 DIAGNOSIS — R10.2 PELVIC PAIN IN FEMALE: ICD-10-CM

## 2018-06-19 DIAGNOSIS — N94.6 DYSMENORRHEA: ICD-10-CM

## 2018-06-19 DIAGNOSIS — R10.2 PELVIC PAIN: ICD-10-CM

## 2018-06-19 PROCEDURE — 76856 US EXAM PELVIC COMPLETE: CPT

## 2018-06-19 PROCEDURE — 76830 TRANSVAGINAL US NON-OB: CPT

## 2018-10-10 ENCOUNTER — TELEPHONE (OUTPATIENT)
Dept: FAMILY MEDICINE | Age: 33
End: 2018-10-10

## 2018-12-31 ENCOUNTER — E-ADVICE (OUTPATIENT)
Dept: FAMILY MEDICINE | Age: 33
End: 2018-12-31

## 2019-01-07 ENCOUNTER — TELEPHONE (OUTPATIENT)
Dept: OTHER | Age: 34
End: 2019-01-07

## 2019-01-07 DIAGNOSIS — L70.0 SUPERFICIAL MIXED COMEDONAL AND INFLAMMATORY ACNE VULGARIS: ICD-10-CM

## 2019-01-07 DIAGNOSIS — L70.0 ACNE VULGARIS: Primary | Chronic | ICD-10-CM

## 2019-01-07 DIAGNOSIS — L70.9 ADULT ACNE: ICD-10-CM

## 2019-01-07 RX ORDER — CLINDAMYCIN PHOSPHATE 11.9 MG/ML
SOLUTION TOPICAL 2 TIMES DAILY
Qty: 60 ML | Refills: 5 | Status: SHIPPED | OUTPATIENT
Start: 2019-01-07 | End: 2020-05-18 | Stop reason: SDUPTHER

## 2019-01-27 ENCOUNTER — E-ADVICE (OUTPATIENT)
Dept: FAMILY MEDICINE | Age: 34
End: 2019-01-27

## 2019-01-29 ENCOUNTER — APPOINTMENT (OUTPATIENT)
Dept: FAMILY MEDICINE | Age: 34
End: 2019-01-29

## 2019-01-31 ENCOUNTER — OFFICE VISIT (OUTPATIENT)
Dept: FAMILY MEDICINE CLINIC | Age: 34
End: 2019-01-31

## 2019-01-31 VITALS
RESPIRATION RATE: 16 BRPM | SYSTOLIC BLOOD PRESSURE: 104 MMHG | TEMPERATURE: 97.8 F | BODY MASS INDEX: 25.16 KG/M2 | HEART RATE: 80 BPM | HEIGHT: 65 IN | OXYGEN SATURATION: 100 % | DIASTOLIC BLOOD PRESSURE: 67 MMHG | WEIGHT: 151 LBS

## 2019-01-31 DIAGNOSIS — S06.9X0A MILD TRAUMATIC BRAIN INJURY, WITHOUT LOSS OF CONSCIOUSNESS, INITIAL ENCOUNTER (HCC): Primary | ICD-10-CM

## 2019-01-31 NOTE — LETTER
NOTIFICATION RETURN TO WORK / SCHOOL 
 
1/31/2019 8:10 PM 
 
Ms. Valentina Rodriguez [address] [id number] To Whom It May Concern: 
 
Valentina Rodriguez is currently under the care of [facility]. Please excuse her absence on 2/1/2019 for medical reasons. She will return to work/school on: 2/4/2019 If there are questions or concerns please have the patient contact our office.  
 
 
 
Sincerely, 
 
 
Brenna Tolentino MD

## 2019-02-01 NOTE — PATIENT INSTRUCTIONS
Concussion: Care Instructions Your Care Instructions A concussion is a kind of injury to the brain. It happens when the head receives a hard blow. The impact can jar or shake the brain against the skull. This interrupts the brain's normal activities. Although you may have cuts or bruises on your head or face, you may have no other visible signs of a brain injury. In most cases, damage to the brain from a concussion can't be seen in tests such as a CT or MRI scan. For a few weeks, you may have low energy, dizziness, trouble sleeping, a headache, ringing in your ears, or nausea. You may also feel anxious, grumpy, or depressed. You may have problems with memory and concentration. These symptoms are common after a concussion. They should slowly improve over time. Sometimes this takes weeks or even months. Someone who lives with you should know how to care for you. Please share this and all information with a caregiver who will be available to help if needed. Follow-up care is a key part of your treatment and safety. Be sure to make and go to all appointments, and call your doctor if you are having problems. It's also a good idea to know your test results and keep a list of the medicines you take. How can you care for yourself at home? Pain control · Put ice or a cold pack on the part of your head that hurts for 10 to 20 minutes at a time. Put a thin cloth between the ice and your skin. · Be safe with medicines. Read and follow all instructions on the label. ? If the doctor gave you a prescription medicine for pain, take it as prescribed. ? If you are not taking a prescription pain medicine, ask your doctor if you can take an over-the-counter medicine. Recovery · Follow your doctor's instructions. He or she will tell you if you need someone to watch you closely for the next 24 hours or longer. · Rest is the best way to recover from a concussion. You need to rest your body and your brain: ? Get plenty of sleep at night. And take rest breaks during the day. ? Avoid activities that take a lot of physical or mental work. This includes housework, exercise, schoolwork, video games, text messaging, and using the computer. ? You may need to change your school or work schedule while you recover. ? Return to your normal activities slowly. Do not try to do too much at once. · Do not drink alcohol or use illegal drugs. Alcohol and illegal drugs can slow your recovery. And they can increase your risk of a second brain injury. · Avoid activities that could lead to another concussion. Follow your doctor's instructions for a gradual return to activity and sports. · Ask your doctor when it's okay for you to drive a car, ride a bike, or operate machinery. How should you return to activity? Your return to activity can begin after 1 to 2 days of physical and mental rest. After resting, you can gradually increase your activity as long as it does not cause new symptoms or worsen your symptoms. Doctors and concussion specialists suggest steps to follow for returning to sports after a concussion. Use these steps as a guide. You should slowly progress through the following levels of activity: 1. Limited activity. You can take part in daily activities as long as the activity doesn't increase your symptoms or cause new symptoms. 2. Light aerobic activity. This can include walking, swimming, or other exercise at less than 70% of maximum heart rate. No resistance training is included in this step. 3. Sport-specific exercise. This includes running drills or skating drills (depending on the sport), but no head impact. 4. Noncontact training drills. This includes more complex training drills such as passing. The athlete may also begin light resistance training. 5. Full-contact practice. The athlete can participate in normal training. 6. Return to normal game play.  This is the final step and allows the athlete to join in normal game play. Watch and keep track of your progress. It should take at least 6 days for you to go from light activity to normal game play. Make sure that you can stay at each new level of activity for at least 24 hours without symptoms, or as long as your doctor says, before doing more. If one or more symptoms come back, return to a lower level of activity for at least 24 hours. Don't move on until all symptoms are gone. When should you call for help? Call 911 anytime you think you may need emergency care. For example, call if: 
  · You have a seizure.  
  · You passed out (lost consciousness).  
  · You are confused or can't stay awake.  
 Call your doctor now or seek immediate medical care if: 
  · You have new or worse vomiting.  
  · You feel less alert.  
  · You have new weakness or numbness in any part of your body.  
 Watch closely for changes in your health, and be sure to contact your doctor if: 
  · You do not get better as expected.  
  · You have new symptoms, such as headaches, trouble concentrating, or changes in mood. Where can you learn more? Go to http://roland-agueda.info/. Enter V886 in the search box to learn more about \"Concussion: Care Instructions. \" Current as of: Laurie 3, 2018 Content Version: 11.9 © 3223-2541 Darkstrand, [employer]. Care instructions adapted under license by Compound Time (which disclaims liability or warranty for this information). If you have questions about a medical condition or this instruction, always ask your healthcare professional. [place] [address] any warranty or liability for your use of this information.

## 2019-02-01 NOTE — PROGRESS NOTES
Olga Espana  26 y.o. female  1985  [address] [facility] [address] [facility] [address] [address]  [phone]   [address]: Progress Note  Meaghan Tovar MD       Encounter Date: 1/31/2019    Chief Complaint   Patient presents with    Other     back of head hit the wall on 1/30/2019, pain level 8/10, blured vision on 1/30/2019, Patient refuse Influenza and tdap vaccone     History of Present Illness   Olga Espana is a 26 y.o. female who presents to clinic today for concerns of head injury. She notes that she hit the back of her head on a wall yesterday. Pain was 8 out of 10 and associated with blurred vision, headache, mental slowing. Denies loss of consciousness. Scat 3 completed in the office (CT scan). All orientation questions normal.   Total number of symptoms: 12/22  Her total symptom severity score: 67/132  Overall rating: Very different    Cognitive assessment: 22/25  Neck examination: Normal   Balance examination: 4 errors  Delayed recall: 5/5    Review of Systems   Review of Systems -  Neuro: Headache, pressure in the head, dizziness, blurred vision, sensitivity to light, sensitivity to noise difficulty concentrating, fatigue, confusion, drowsiness  MSK: Balance troubles  GI: Nausea  Psych: Nervous  Vitals/Objective:     Vitals:    01/31/19 1916   BP: 104/67   Pulse: 80   Resp: 16   Temp: 97.8 °F (36.6 °C)   TempSrc: Oral   SpO2: 100%   Weight: 151 lb (68.5 kg)   Height: 5' 5\" (1.651 m)     Body mass index is 25.13 kg/m². General: Patient alert and oriented and in NAD  HEENT: PER/EOMI, no conjunctival pallor or scleral icterus. No thyromegaly or cervical lymphadenopathy  Heart: Regular rate and rhythm, No murmurs, rubs or gallops.   2+ peripheral pulses  Lungs: Clear to auscultation bilaterally, no wheezing, rales or rhonchi  Abd: +BS, non-tender, non-distended  Ext: No edema  Skin: No rashes or lesions noted on exposed skin,  Psych: Appropriate mood and affect      Assessment and Plan:   1. Mild traumatic brain injury, without loss of consciousness, initial encounter [facility])  Findings most consistent with concussion. Discussed cognitive rest, including avoidance of television, telephone, reading, and other activities that require significant concentration. Given written note to stay out of work until reevaluation. I have discussed the diagnosis with the patient and the intended plan as seen in the above orders. she has expressed understanding. The patient has received an after-visit summary and questions were answered concerning future plans. I have discussed medication side effects and warnings with the patient as well. Follow-up Disposition:  Return in about 1 week (around 2/7/2019). Electronically Signed: Sara Garcia MD     History   Patients past medical, surgical and family histories were reviewed and updated. History reviewed. No pertinent past medical history. No past surgical history on file.   Family History   Problem Relation Age of Onset    Cancer Mother         breast    Cancer Father         prostate     Social History     Socioeconomic History    Marital status: UNKNOWN     Spouse name: Not on file    Number of children: Not on file    Years of education: Not on file    Highest education level: Not on file   Social Needs    Financial resource strain: Not on file    Food insecurity - worry: Not on file    Food insecurity - inability: Not on file   POPRAGEOUS needs - medical: Not on file   POPRAGEOUS needs - non-medical: Not on file   Occupational History    Not on file   Tobacco Use    Smoking status: Never Smoker    Smokeless tobacco: Never Used   Substance and Sexual Activity    Alcohol use: No    Drug use: No    Sexual activity: Not Currently     Partners: Male   Other Topics Concern    Not on file   Social History Narrative    Not on file            Current Medications/Allergies Current Outpatient Medications   Medication Sig Dispense Refill    acetaminophen (TYLENOL PO) Take 500 mg by mouth.        No Known Allergies

## 2019-02-04 ENCOUNTER — TELEPHONE (OUTPATIENT)
Dept: FAMILY MEDICINE CLINIC | Age: 34
End: 2019-02-04

## 2019-02-04 NOTE — TELEPHONE ENCOUNTER
Patient called to say she was seen recently for headaches. Today she is at work but has a headache, has blurred vision, and stomach hurts. Nurse advice took the call.

## 2019-02-05 ENCOUNTER — TELEPHONE (OUTPATIENT)
Dept: TELEHEALTH | Age: 34
End: 2019-02-05

## 2019-02-05 NOTE — TELEPHONE ENCOUNTER
I called and let her know that with her symptoms and recent fall and new symptom of blurred vision that she should go to the ER to be evaluated. [atient verbalized understanding. I called patient this morning to follow up to see if she went to ER for symptoms, I was unable to reach but left a voicemail for her to call me back here at the office.

## 2019-02-06 ENCOUNTER — E-ADVICE (OUTPATIENT)
Dept: FAMILY MEDICINE | Age: 34
End: 2019-02-06

## 2019-02-07 ENCOUNTER — OFFICE VISIT (OUTPATIENT)
Dept: FAMILY MEDICINE CLINIC | Age: 34
End: 2019-02-07

## 2019-02-07 VITALS
OXYGEN SATURATION: 99 % | SYSTOLIC BLOOD PRESSURE: 124 MMHG | BODY MASS INDEX: 24.83 KG/M2 | TEMPERATURE: 98.4 F | WEIGHT: 149 LBS | DIASTOLIC BLOOD PRESSURE: 82 MMHG | HEART RATE: 81 BPM | HEIGHT: 65 IN | RESPIRATION RATE: 16 BRPM

## 2019-02-07 DIAGNOSIS — S06.0X0D CONCUSSION WITHOUT LOSS OF CONSCIOUSNESS, SUBSEQUENT ENCOUNTER: Primary | ICD-10-CM

## 2019-02-07 RX ORDER — IBUPROFEN 200 MG
TABLET ORAL
COMMUNITY
End: 2019-02-27

## 2019-02-07 NOTE — LETTER
NOTIFICATION RETURN TO WORK 
 
2/7/2019 5:30 PM 
 
Ms. Nikkie Lewis [address] [facility] [address] [id number] [facility] [id number] To Whom It May Concern: 
 
Nikkie Lewis is currently under the care of [facility] [facility]. She will return to work on: 2/14/19 If there are questions or concerns please have the patient contact our office. Sincerely, Doreen Zazueta MD

## 2019-02-07 NOTE — PATIENT INSTRUCTIONS
Concussion: Care Instructions  Your Care Instructions    A concussion is a kind of injury to the brain. It happens when the head receives a hard blow. The impact can jar or shake the brain against the skull. This interrupts the brain's normal activities. Although you may have cuts or bruises on your head or face, you may have no other visible signs of a brain injury. In most cases, damage to the brain from a concussion can't be seen in tests such as a CT or MRI scan. For a few weeks, you may have low energy, dizziness, trouble sleeping, a headache, ringing in your ears, or nausea. You may also feel anxious, grumpy, or depressed. You may have problems with memory and concentration. These symptoms are common after a concussion. They should slowly improve over time. Sometimes this takes weeks or even months. Someone who lives with you should know how to care for you. Please share this and all information with a caregiver who will be available to help if needed. Follow-up care is a key part of your treatment and safety. Be sure to make and go to all appointments, and call your doctor if you are having problems. It's also a good idea to know your test results and keep a list of the medicines you take. How can you care for yourself at home? Pain control  · Put ice or a cold pack on the part of your head that hurts for 10 to 20 minutes at a time. Put a thin cloth between the ice and your skin. · Be safe with medicines. Read and follow all instructions on the label. ? If the doctor gave you a prescription medicine for pain, take it as prescribed. ? If you are not taking a prescription pain medicine, ask your doctor if you can take an over-the-counter medicine. Recovery  · Follow your doctor's instructions. He or she will tell you if you need someone to watch you closely for the next 24 hours or longer. · Rest is the best way to recover from a concussion.  You need to rest your body and your brain:  ? Get plenty of sleep at night. And take rest breaks during the day. ? Avoid activities that take a lot of physical or mental work. This includes housework, exercise, schoolwork, video games, text messaging, and using the computer. ? You may need to change your school or work schedule while you recover. ? Return to your normal activities slowly. Do not try to do too much at once. · Do not drink alcohol or use illegal drugs. Alcohol and illegal drugs can slow your recovery. And they can increase your risk of a second brain injury. · Avoid activities that could lead to another concussion. Follow your doctor's instructions for a gradual return to activity and sports. · Ask your doctor when it's okay for you to drive a car, ride a bike, or operate machinery. How should you return to activity? Your return to activity can begin after 1 to 2 days of physical and mental rest. After resting, you can gradually increase your activity as long as it does not cause new symptoms or worsen your symptoms. Doctors and concussion specialists suggest steps to follow for returning to sports after a concussion. Use these steps as a guide. You should slowly progress through the following levels of activity:  1. Limited activity. You can take part in daily activities as long as the activity doesn't increase your symptoms or cause new symptoms. 2. Light aerobic activity. This can include walking, swimming, or other exercise at less than 70% of maximum heart rate. No resistance training is included in this step. 3. Sport-specific exercise. This includes running drills or skating drills (depending on the sport), but no head impact. 4. Noncontact training drills. This includes more complex training drills such as passing. The athlete may also begin light resistance training. 5. Full-contact practice. The athlete can participate in normal training. 6. Return to normal game play.  This is the final step and allows the athlete to join in normal game play. Watch and keep track of your progress. It should take at least 6 days for you to go from light activity to normal game play. Make sure that you can stay at each new level of activity for at least 24 hours without symptoms, or as long as your doctor says, before doing more. If one or more symptoms come back, return to a lower level of activity for at least 24 hours. Don't move on until all symptoms are gone. When should you call for help? Call 911 anytime you think you may need emergency care. For example, call if:    · You have a seizure.     · You passed out (lost consciousness).     · You are confused or can't stay awake.    Call your doctor now or seek immediate medical care if:    · You have new or worse vomiting.     · You feel less alert.     · You have new weakness or numbness in any part of your body.    Watch closely for changes in your health, and be sure to contact your doctor if:    · You do not get better as expected.     · You have new symptoms, such as headaches, trouble concentrating, or changes in mood. Where can you learn more? Go to http://roland-agueda.info/. Enter Y287 in the search box to learn more about \"Concussion: Care Instructions. \"  Current as of: Laurie 3, 2018  Content Version: 11.9  © 6456-1089 [employer]. Care instructions adapted under license by [employer] (which disclaims liability or warranty for this information). If you have questions about a medical condition or this instruction, always ask your healthcare professional. [place] [address] any warranty or liability for your use of this information.

## 2019-02-07 NOTE — PROGRESS NOTES
[address] [facility] with [address] [facility]     Chief Complaint: \"follow up on head injury. \"    Jagdeep Mosher is an 26 y.o. female who presents for follow up on head injury. Hit hte back of her head on the wall from a whiplash motion on 1/30. Since the time of the injury, she has felt increasingly ill. Has had daily headaches. Was seen 1/31 for the injury. Was held out of work until 2/4. Has gone to work daily until today. States that she cannot complete her job to the best of her ability. Now with increased pain in stomach. Ibuprofen is helping with headaches, but states that they still occur daily. Has not had a prior concussion. She is right-handed. Completed 4 years of high school. No history of migraines, ADD/ADHD, learning disability, dyslexia, depression, anxiety. SCAT3 Questionnaire (total symptom score 23): Allergies - reviewed:   No Known Allergies    Medications - reviewed:   Current Outpatient Medications   Medication Sig    ibuprofen (MOTRIN) 200 mg tablet Take  by mouth. No current facility-administered medications for this visit. I have reviewed and updated the histories as listed below:    Past Medical History - reviewed:  History reviewed. No pertinent past medical history. Past Surgical History - reviewed:   History reviewed. No pertinent surgical history.       Social History - reviewed:  Social History     Socioeconomic History    Marital status: UNKNOWN     Spouse name: Not on file    Number of children: Not on file    Years of education: Not on file    Highest education level: Not on file   Social Needs    Financial resource strain: Not on file    Food insecurity - worry: Not on file    Food insecurity - inability: Not on file   [employer] needs - medical: Not on file   [employer] needs - non-medical: Not on file   Occupational History    Not on file   Tobacco Use    Smoking status: Never Smoker    Smokeless tobacco: Never Used   Substance and Sexual Activity    Alcohol use: No    Drug use: No    Sexual activity: Not Currently     Partners: Male   Other Topics Concern    Not on file   Social History Narrative    Not on file         Family History - reviewed:  Family History   Problem Relation Age of Onset    Cancer Mother         breast    Cancer Father         prostate         Immunizations - reviewed: There is no immunization history on file for this patient. Review of Systems  Review of Systems   Constitutional: Negative for chills and fever. Respiratory: Negative for shortness of breath. Cardiovascular: Negative for chest pain. Neurological: Positive for light-headedness and headaches. Negative for dizziness. Psychiatric/Behavioral: Positive for confusion and decreased concentration. Negative for sleep disturbance and suicidal ideas. The patient is not nervous/anxious. Physical Exam    Visit Vitals  /82   Pulse 81   Temp 98.4 °F (36.9 °C) (Oral)   Resp 16   Ht 5' 5\" (1.651 m)   Wt 149 lb (67.6 kg)   SpO2 99%   BMI 24.79 kg/m²       General: Alert and oriented, in no acute distress. Slow to respond to all questions, but does respond appropriately. EYES: Conjunctiva are clear bilaterally; pupils round and reactive to light; extraocular movements intact. Accomodation: normal   Horizontal tracking: +nystagmus    Eye tracking from examiners nose to examiners thumb on command:     +nystagmus. Coordinated movements. NECK:  Supple; no masses; no lymphadenopathy. LUNGS: Respirations unlabored; clear to auscultation bilaterally. CARDIOVASCULAR: Regular, rate, and rhythm without murmurs, gallops or rubs  NEUROLOGIC:     Cranial nerves II - XII: Intact   Speech: Normal.     Face: Symmetrical   Extremities: Moving all equally, well perfused, and no edema. Strength and sensation: Grossly intact.      Gait: Normal   Rhombergs: Negative   Finger to Nose with eyes closed: abnormal, hits cheek     Unable to perform 3 word recall at 1 min and 5 min. Only remembers 1 word. Serial 7s abnormal.  Long term memory intact (remembers phone number, address, friends names). ALEYDA: Double leg stance: poor balance; Tandem Stance: did not examine; Single leg stance did not examine. Derek-Devic: Test 1: 1 errors; Test 2:  0 errors; Test 3:  2 errors. Timing of Derek-Devic testing not recorded as error noted in timing device following completion of testing. Assessment    ICD-10-CM ICD-9-CM    1. Concussion without loss of consciousness, subsequent encounter S06.0X0D V58.89 REFERRAL TO PHYSICAL THERAPY     850.0      Plan  1. Concussion without loss of consciousness, subsequent encounter - out of work for an additional week for recovery. Advised no driving. She is experiencing severe symptoms. Recommended rest with no electronic stimulation (no TV, no phone). Referral placed to therapy [facility]). Follow up in 1 week with sports medicine clinic--appointment made on discharge from office today.  - REFERRAL TO PHYSICAL THERAPY    Follow-up Disposition:  Return in 6 days (on 2/13/2019) for concussion follow up--sports medicine clinic. I have discussed the diagnosis with the patient and the intended plan as seen in the above orders. Patient verbalized understanding of the plan and agrees with the plan. The patient has received an after-visit summary and questions were answered concerning future plans. I have discussed medication side effects and warnings with the patient as well. Informed patient to return to the office if new symptoms arise. Patient was discussed with Dr. Carolina Romero.     Ric Osuna MD  Family Medicine Resident

## 2019-02-12 ENCOUNTER — E-ADVICE (OUTPATIENT)
Dept: FAMILY MEDICINE | Age: 34
End: 2019-02-12

## 2019-02-13 ENCOUNTER — OFFICE VISIT (OUTPATIENT)
Dept: FAMILY MEDICINE CLINIC | Age: 34
End: 2019-02-13

## 2019-02-13 VITALS
BODY MASS INDEX: 24.99 KG/M2 | HEIGHT: 65 IN | WEIGHT: 150 LBS | RESPIRATION RATE: 16 BRPM | DIASTOLIC BLOOD PRESSURE: 74 MMHG | HEART RATE: 87 BPM | TEMPERATURE: 98.9 F | SYSTOLIC BLOOD PRESSURE: 106 MMHG | OXYGEN SATURATION: 99 %

## 2019-02-13 DIAGNOSIS — S06.0X0A CONCUSSION WITHOUT LOSS OF CONSCIOUSNESS, INITIAL ENCOUNTER: Primary | ICD-10-CM

## 2019-02-13 NOTE — PROGRESS NOTES
Chief Complaint   Patient presents with    Concussion     folow up     . 1. Have you been to the ER, urgent care clinic since your last visit? Hospitalized since your last visit? No    2. Have you seen or consulted any other health care providers outside of the [address] [place] [address] since your last visit? Include any pap smears or colon screening. No    Derek-Devick Test             Test Number Seconds Errors   1)        22.79          0   2)        24.62           0   3)         31.35          0        B.E.S.S. Score Card    Count Number of Errors   Max of 10 ea.  Stance/  Surface   Firm Surface Foam Surface   Double Leg Stance  (Feet Together)        0    Single Leg Stance  (non-dominant foot)      10    Tandem Stance  (non-dominant foot in back)        3    Total Score:        13

## 2019-02-14 NOTE — PROGRESS NOTES
Divya Richards is a 26 y.o. female who presents for evaluation of concussion. She was sitting in a chair 2 weeks ago and leaned her head back. She hit her head on the wall and developed a headache. No LOC. No neurological deficits. She continue to have headaches. She is taking motrin with some relief. She was seen in clinic 1 week ago and referred to PT for concussion rehab but has not started. Her sleep is decreased. PMHx:  History reviewed. No pertinent past medical history. Meds:   Current Outpatient Medications   Medication Sig Dispense Refill    ibuprofen (MOTRIN) 200 mg tablet Take  by mouth. Allergies:   No Known Allergies    Smoker:  Social History     Tobacco Use   Smoking Status Never Smoker   Smokeless Tobacco Never Used       ETOH:   Social History     Substance and Sexual Activity   Alcohol Use No       FH:   Family History   Problem Relation Age of Onset    Cancer Mother         breast    Cancer Father         prostate       ROS:  Per HPI  See scanned sx score sheet. Physical Exam:  Visit Vitals  /74 (BP 1 Location: Left arm, BP Patient Position: Sitting)   Pulse 87   Temp 98.9 °F (37.2 °C) (Oral)   Resp 16   Ht 5' 5\" (1.651 m)   Wt 150 lb (68 kg)   LMP 01/22/2019   SpO2 99%   BMI 24.96 kg/m²     General: Alert and oriented, in no acute distress. Responds to all questions appropriately. EYES: Conjunctiva are clear bilaterally; pupils round and reactive to light; extraocular movements intact. Accomodation: 3 inches    Horizontal tracking: no nystagmus    Eye tracking from examiners nose to examiners thumb on command:     no nystagmus. Coordinated movements: intact but slow  NECK:  Supple; no masses; no lymphadenopathy. LUNGS: Respirations unlabored; clear to auscultation bilaterally.   CARDIOVASCULAR: Regular, rate, and rhythm without murmurs, gallops or rubs  NEUROLOGIC:     Cranial nerves II - XII: Intact   Speech: Normal.     Face: Symmetrical   Extremities: Moving all equally, well perfused, and no edema. Strength and sensation: Grossly intact. Gait: Normal   Rhombergs: Negative   Finger to Nose with eyes closed: intact but slow   Repeated movements with right thumb extended and tracking with left thumb from thumb to nose with: Eyes open: intact; Eyes closed: intact   Repeated movements with left thumb extended and tracking with right thumb from thumb to nose with: Eyes open: intact; Eyes closed: intact    Able to perform 3 word recall at 1 min and 2/3 5 min. Able to spell WORLD frontwards and backwards. Long term memory intact (remembers phone number, address, friends names). Derek-Devick Test                     Test Number Seconds Errors   1)        22.79          0   2)        24.62           0   3)         31.35          0         B.E.S.S. Score Card     Count Number of Errors   Max of 10 ea. Stance/  Surface    Firm Surface Foam Surface   Double Leg Stance  (Feet Together)        0     Single Leg Stance  (non-dominant foot)      10     Tandem Stance  (non-dominant foot in back)        3     Total Score:              Assessment:    ICD-10-CM ICD-9-CM    1. Concussion without loss of consciousness, initial encounter S06.0X0A 850.0        Plan:  Start concussion rehab with PT. Activities as tolerated    Medications:    1. Naproxin (Aleve): 220mg 1-2 tablets twice a day PRN. 2. Acetaminophen (Tylenol):  500mg 1-2 tablets every 6 hours as needed for pain.     RTC: 2 weeks

## 2019-02-18 ENCOUNTER — OFFICE VISIT (OUTPATIENT)
Dept: FAMILY MEDICINE | Age: 34
End: 2019-02-18

## 2019-02-18 ENCOUNTER — CLINICAL ABSTRACT (OUTPATIENT)
Dept: PHARMACY | Age: 34
End: 2019-02-18

## 2019-02-18 VITALS
RESPIRATION RATE: 16 BRPM | HEIGHT: 64 IN | TEMPERATURE: 97.7 F | SYSTOLIC BLOOD PRESSURE: 82 MMHG | BODY MASS INDEX: 25.78 KG/M2 | HEART RATE: 78 BPM | OXYGEN SATURATION: 97 % | WEIGHT: 151 LBS | DIASTOLIC BLOOD PRESSURE: 58 MMHG

## 2019-02-18 DIAGNOSIS — Z86.69 HISTORY OF MIGRAINE HEADACHES: Primary | ICD-10-CM

## 2019-02-18 DIAGNOSIS — J30.2 SEASONAL ALLERGIES: ICD-10-CM

## 2019-02-18 DIAGNOSIS — G43.909 MIGRAINE WITHOUT STATUS MIGRAINOSUS, NOT INTRACTABLE, UNSPECIFIED MIGRAINE TYPE: ICD-10-CM

## 2019-02-18 PROCEDURE — 99395 PREV VISIT EST AGE 18-39: CPT | Performed by: FAMILY MEDICINE

## 2019-02-18 ASSESSMENT — PATIENT HEALTH QUESTIONNAIRE - PHQ9
SUM OF ALL RESPONSES TO PHQ9 QUESTIONS 1 AND 2: 0
1. LITTLE INTEREST OR PLEASURE IN DOING THINGS: NOT AT ALL
2. FEELING DOWN, DEPRESSED OR HOPELESS: NOT AT ALL
SUM OF ALL RESPONSES TO PHQ9 QUESTIONS 1 AND 2: 0

## 2019-02-19 RX ORDER — CEFUROXIME AXETIL 250 MG/1
6 TABLET ORAL
Qty: 4 ML | Refills: 3 | Status: SHIPPED | OUTPATIENT
Start: 2019-02-19 | End: 2020-05-18 | Stop reason: SDUPTHER

## 2019-02-19 RX ORDER — FLUTICASONE PROPIONATE 50 MCG
2 SPRAY, SUSPENSION (ML) NASAL DAILY
Qty: 16 G | Refills: 5 | Status: SHIPPED | COMMUNITY
Start: 2019-02-18

## 2019-02-19 RX ORDER — AMITRIPTYLINE HYDROCHLORIDE 25 MG/1
TABLET, FILM COATED ORAL
Qty: 90 TABLET | Refills: 3 | Status: SHIPPED | OUTPATIENT
Start: 2019-02-19 | End: 2023-10-30 | Stop reason: SINTOL

## 2019-02-19 RX ORDER — MULTIVIT,TX WITH IRON,MINERALS
TABLET, EXTENDED RELEASE ORAL
Qty: 120 TABLET | Refills: 3 | Status: SHIPPED | OUTPATIENT
Start: 2019-02-19

## 2019-02-19 RX ORDER — NAPROXEN 500 MG/1
TABLET ORAL
Qty: 60 TABLET | Refills: 4 | Status: SHIPPED | OUTPATIENT
Start: 2019-02-19

## 2019-02-27 ENCOUNTER — OFFICE VISIT (OUTPATIENT)
Dept: FAMILY MEDICINE CLINIC | Age: 34
End: 2019-02-27

## 2019-02-27 VITALS
BODY MASS INDEX: 25.16 KG/M2 | DIASTOLIC BLOOD PRESSURE: 64 MMHG | RESPIRATION RATE: 16 BRPM | HEART RATE: 73 BPM | HEIGHT: 65 IN | TEMPERATURE: 98.7 F | SYSTOLIC BLOOD PRESSURE: 114 MMHG | OXYGEN SATURATION: 100 % | WEIGHT: 151 LBS

## 2019-02-27 DIAGNOSIS — S06.0X0D CONCUSSION WITHOUT LOSS OF CONSCIOUSNESS, SUBSEQUENT ENCOUNTER: Primary | ICD-10-CM

## 2019-02-27 NOTE — PROGRESS NOTES
Identified Patient with two Patient identifiers (Name and ). Two Patient Identifiers confirmed. Reviewed record in preparation for visit and have obtained necessary documentation. Chief Complaint   Patient presents with    Concussion     Follow Up - Tylenol As Needed        Visit Vitals  /64 (BP 1 Location: Left arm, BP Patient Position: Sitting)   Pulse 73   Temp 98.7 °F (37.1 °C) (Oral)   Resp 16   Ht 5' 5\" (1.651 m)   Wt 151 lb (68.5 kg)   SpO2 100%   BMI 25.13 kg/m²       1. Have you been to the ER, urgent care clinic since your last visit? Hospitalized since your last visit? No    2. Have you seen or consulted any other health care providers outside of the [address] [place] [address] since your last visit? Include any pap smears or colon screening. No         Derek-Devick Test             Test Number Seconds Errors   1)      22.15 secs 0 errors   2)      23.22 secs 1 error   3)      25.55 secs 1 error        B.E.S.S. Score Card    Count Number of Errors   Max of 10 ea.  Stance/  Surface   Firm Surface Foam Surface   Double Leg Stance  (Feet Together)   0     Single Leg Stance  (non-dominant foot)    6    Tandem Stance  (non-dominant foot in back)    2    Total Score:     8

## 2019-02-27 NOTE — PROGRESS NOTES
Sana Dawson is a 26 y.o. female who presents for follow-up of concussion. She was sitting in a chair 4 weeks ago and leaned her head back. She hit her head on the wall and developed a headache. No LOC. No neurological deficits. She continue to have headaches. She was seen in clinic 2 weeks ago. She has started PT with [facility]. She is taking motrin with some relief. Her sleep has improved. She has returned to work and is working half days. Partner is with her today. He states that she seems to be acting her normal self at home. PMHx:  History reviewed. No pertinent past medical history. Meds: motrin PRN      Allergies:   No Known Allergies    Smoker:  Social History     Tobacco Use   Smoking Status Never Smoker   Smokeless Tobacco Never Used       ETOH:   Social History     Substance and Sexual Activity   Alcohol Use No       FH:   Family History   Problem Relation Age of Onset    Cancer Mother         breast    Cancer Father         prostate       ROS:  Per HPI  See scanned sx score sheet. Physical Exam:  Visit Vitals  /64 (BP 1 Location: Left arm, BP Patient Position: Sitting)   Pulse 73   Temp 98.7 °F (37.1 °C) (Oral)   Resp 16   Ht 5' 5\" (1.651 m)   Wt 151 lb (68.5 kg)   SpO2 100%   BMI 25.13 kg/m²     General: Alert and oriented, in no acute distress. Responds to all questions appropriately. EYES: Conjunctiva are clear bilaterally; pupils round and reactive to light; extraocular movements intact. Accomodation: 3 inches    Horizontal tracking: no nystagmus    Eye tracking from examiners nose to examiners thumb on command:     no nystagmus. Coordinated movements: intact but slow  NECK:  Supple; no masses; no lymphadenopathy. LUNGS: Respirations unlabored; clear to auscultation bilaterally.   CARDIOVASCULAR: Regular, rate, and rhythm without murmurs, gallops or rubs  NEUROLOGIC:     Cranial nerves II - XII: Intact   Speech: Normal.     Face: Symmetrical   Extremities: Moving all equally, well perfused, and no edema. Strength and sensation: Grossly intact. Gait: Normal   Timed Tandem Gait: 12.51 seconds     Finger to Nose with eyes closed: intact but slow   Repeated movements with right thumb extended and tracking with left thumb from thumb to nose with: Eyes open: intact; Eyes closed: intact   Repeated movements with left thumb extended and tracking with right thumb from thumb to nose with: Eyes open: intact; Eyes closed: intact    Able to perform 3 word recall at 1 min and 3 at 5 min. Able to spell WORLD frontwards and backwards. Derek-Devick Test                     Test Number Seconds Errors   1)      22.15 secs 0 errors   2)      23.22 secs 1 error   3)      25.55 secs 1 error         B.E.S.S. Score Card     Count Number of Errors   Max of 10 ea. Stance/  Surface    Firm Surface Foam Surface   Double Leg Stance  (Feet Together)   0      Single Leg Stance  (non-dominant foot)    6     Tandem Stance  (non-dominant foot in back)    2     Total Score:      8           Assessment:    ICD-10-CM ICD-9-CM    1. Concussion without loss of consciousness, subsequent encounter S06.0X0D V58.89      850.0      Sx improving. Plan:  Continue rehab with PT. She will return to work full time as she reports minimal sx at work currently with half days. Activities as tolerated    Medications:    1. Naproxin (Aleve): 220mg 1-2 tablets twice a day PRN. 2. Acetaminophen (Tylenol):  500mg 1-2 tablets every 6 hours as needed for pain.     RTC: 2 weeks

## 2019-03-04 ENCOUNTER — OFFICE VISIT (OUTPATIENT)
Dept: FAMILY MEDICINE CLINIC | Age: 34
End: 2019-03-04

## 2019-03-04 ENCOUNTER — TELEPHONE (OUTPATIENT)
Dept: FAMILY MEDICINE | Age: 34
End: 2019-03-04

## 2019-03-04 VITALS
RESPIRATION RATE: 20 BRPM | DIASTOLIC BLOOD PRESSURE: 67 MMHG | TEMPERATURE: 99.1 F | BODY MASS INDEX: 24.26 KG/M2 | OXYGEN SATURATION: 100 % | HEIGHT: 65 IN | HEART RATE: 88 BPM | WEIGHT: 145.6 LBS | SYSTOLIC BLOOD PRESSURE: 102 MMHG

## 2019-03-04 DIAGNOSIS — H51.11 CONVERGENCE INSUFFICIENCY: ICD-10-CM

## 2019-03-04 DIAGNOSIS — F07.81 POST CONCUSSION SYNDROME: Primary | ICD-10-CM

## 2019-03-04 DIAGNOSIS — R11.0 NAUSEA: ICD-10-CM

## 2019-03-04 DIAGNOSIS — R10.84 GENERALIZED ABDOMINAL PAIN: ICD-10-CM

## 2019-03-04 DIAGNOSIS — R51.9 NONINTRACTABLE HEADACHE, UNSPECIFIED CHRONICITY PATTERN, UNSPECIFIED HEADACHE TYPE: ICD-10-CM

## 2019-03-04 DIAGNOSIS — M54.2 NECK PAIN: ICD-10-CM

## 2019-03-04 LAB
BILIRUB UR QL STRIP: NEGATIVE
GLUCOSE UR-MCNC: NEGATIVE MG/DL
HCG URINE, QL. (POC): NEGATIVE
KETONES P FAST UR STRIP-MCNC: NEGATIVE MG/DL
PH UR STRIP: 7.5 [PH] (ref 4.6–8)
PROT UR QL STRIP: NEGATIVE
SP GR UR STRIP: 1.02 (ref 1–1.03)
UA UROBILINOGEN AMB POC: NORMAL (ref 0.2–1)
URINALYSIS CLARITY POC: CLEAR
URINALYSIS COLOR POC: YELLOW
URINE BLOOD POC: NORMAL
URINE LEUKOCYTES POC: NORMAL
URINE NITRITES POC: NEGATIVE
VALID INTERNAL CONTROL?: YES

## 2019-03-04 NOTE — PROGRESS NOTES
History of Present Illness     Patient Identification  Sammy Cedeno is a 26 y.o. female here for follow-up on concussion. Has been going to [facility] for occipital therapy twice a week for an hour each. She continues to have complaints of headache. Taking up to 6 Tylenols a day. Nothing seems to make it better or worse. She has no trouble falling or staying asleep. She does not exercise. Complains of new onset nausea now that is worse throughout that started about 10 days ago. Throughout the day, does not vomiting, recently had her period 2/19/19. Does have occasional abdominal pain, but this is not daily, does not better or worse with food. No current complaints of abdominal pain today. Patient also complaining of neck pain since her injury. She has returned to full work. She does feel tired at the end of the day. Date of Onset: 1/30/19  Mechanism of Injury:  Hit back of her head at work while on sofa      History reviewed. No pertinent past medical history. Family History   Problem Relation Age of Onset    Cancer Mother         breast    Cancer Father         prostate     Current Outpatient Medications   Medication Sig Dispense Refill    acetaminophen (TYLENOL PO) Take 500 mg by mouth. No Known Allergies    Review of Systems  A comprehensive review of systems was negative except for that written in the HPI. Physical Exam     Visit Vitals  /67 (BP 1 Location: Right arm, BP Patient Position: Sitting)   Pulse 88   Temp 99.1 °F (37.3 °C)   Resp 20   Ht 5' 5\" (1.651 m)   Wt 145 lb 9.6 oz (66 kg)   SpO2 100%   BMI 24.23 kg/m²     General: Alert and oriented, in no acute distress. Responds to all questions appropriately. Lungs: No labored respirations. Talking in  complete sentences without difficulty.   Abdomen:  BS normal, NTND, no rebound or guarding      VOMS: Asymptomatic   [place] Convergence:  15cm, symptomatic  Horizonal-Pursuits- symptomatic  Horizontal Saccades-symptomatic   Vertical Saccades- symptomatic  Horizontal VOR - symptomatic   Vertical VOR- symtomatic  VORC- symptomatic      Assessment:    ICD-10-CM ICD-9-CM    1. Post concussion syndrome F07.81 310.2 REFERRAL TO PHYSICAL THERAPY   2. Nausea T89.0 325.70 METABOLIC PANEL, COMPREHENSIVE      CBC WITH AUTOMATED DIFF      AMB POC URINALYSIS DIP STICK AUTO W/O MICRO      AMB POC URINE PREGNANCY TEST, VISUAL COLOR COMPARISON   3. Convergence insufficiency H51.11 378.83 REFERRAL TO PHYSICAL THERAPY   4. Nonintractable headache, unspecified chronicity pattern, unspecified headache type R51 784.0    5. Neck pain M54.2 723.1 REFERRAL TO PHYSICAL THERAPY   6. Generalized abdominal pain R10.84 789.07        Plan:  Still with significant vestibular and occular symptoms. Continue with therapy. Encouraged to do her home exercises. Also instructed to start some light cardio, 10-15 minutes a day on a stationary bike. She should cut back on taking so much Tylenol. Concern for rebound headaches. Patient not interested in any other medications at this time. Importance of scheduled meals and not taking naps emphasized. Stay well-hydrated. Nausea and intermittent abdominal pain may or may not be from her concussion. She reports having her nausea far out from her current concussion initial presentation. We will get labs to assess for other causes. Patient encounter was at least 25 minutes with more than 50 percent of the visit involved in counseling      Follow-up Disposition:  Return in about 2 weeks (around 3/18/2019) for Concussion .

## 2019-03-04 NOTE — PATIENT INSTRUCTIONS
Postconcussion Syndrome: Care Instructions Your Care Instructions Postconcussion syndrome occurs after a blow to the head or body. Common symptoms are changes in the ability to concentrate, think, remember, or solve problems. Symptoms, which may include headaches, personality changes, and dizziness, may be related to stress from the events surrounding the accident that caused the injury. Follow-up care is a key part of your treatment and safety. Be sure to make and go to all appointments, and call your doctor if you are having problems. It's also a good idea to know your test results and keep a list of the medicines you take. How can you care for yourself at home? Pain · Rest is the best treatment for postconcussion syndrome. · Do not drive if you have taken a prescription pain medicine. · Rest in a quiet, dark room until your headache is gone. Close your eyes and try to relax or go to sleep. Do not watch TV or read. · Put a cold, moist cloth or cold pack on the painful area for 10 to 20 minutes at a time. Put a thin cloth between the cold pack and your skin. · Have someone gently massage your neck and shoulders. · Take your medicines exactly as prescribed. Call your doctor if you think you are having a problem with your medicine. You will get more details on the specific medicines your doctor prescribes. Stress · Try to reduce stress. Some ways to do this include: ? Taking slow, deep breaths. ? Soaking in a warm bath. ? Listening to soothing music. ? Taking a yoga class. ? Having a massage or back rub. ? Drinking a warm, nonalcoholic, noncaffeinated beverage. · Get enough sleep. · Eat a healthy, balanced diet. A balanced diet includes whole grains, dairy, fruits and vegetables, and protein. Eat a variety of foods from each of those groups so you get all the nutrients you need. · Avoid alcohol and illegal drugs.  
· Try relaxation exercises, such as breathing and muscle relaxation exercises. · Talk to your doctor about counseling. It may help you deal with stress from your accident. When should you call for help? Watch closely for changes in your health, and be sure to contact your doctor if: 
  · You do not get better as expected.  
  · Your symptoms, such as headaches, trouble concentrating, or changes in mood, get worse. Where can you learn more? Go to http://roland-agueda.info/. Enter O174 in the search box to learn more about \"Postconcussion Syndrome: Care Instructions. \" Current as of: Laurie 3, 2018 Content Version: 11.9 © 8543-8915 [employer]. Care instructions adapted under license by [employer] (which disclaims liability or warranty for this information). If you have questions about a medical condition or this instruction, always ask your healthcare professional. [address] any warranty or liability for your use of this information.

## 2019-03-04 NOTE — PROGRESS NOTES
Chief Complaint   Patient presents with    Concussion     follow up from 1-21-19,  Patient states she is still nauseated with headaces.  Neck Pain     radiating to shoulders Right side greater than left today. .  1. Have you been to the ER, urgent care clinic since your last visit? Hospitalized since your last visit? No    2. Have you seen or consulted any other health care providers outside of the [address] [place] [address] since your last visit? Include any pap smears or colon screening.  no    Visit Vitals  /67 (BP 1 Location: Right arm, BP Patient Position: Sitting)   Pulse 88   Temp 99.1 °F (37.3 °C)   Resp 20   Ht 5' 5\" (1.651 m)   Wt 145 lb 9.6 oz (66 kg)   SpO2 100%   BMI 24.23 kg/m²

## 2019-03-05 ENCOUNTER — TELEPHONE (OUTPATIENT)
Dept: FAMILY MEDICINE CLINIC | Age: 34
End: 2019-03-05

## 2019-03-05 NOTE — TELEPHONE ENCOUNTER
----- Message from Luisana Rizo sent at 3/5/2019  2:45 PM EST -----  Regarding: DR Rafaela Abbasi / TELEPHONE   Pt is requesting that the Physical Therapy order be sent to [facility].        Best Contact:  [phone]

## 2019-03-05 NOTE — TELEPHONE ENCOUNTER
Per call from Alysa Booker with [facility], she states that patient's order was faxed to [facility] in error.  She states that patient is coming to them and asking that you have order faxed to [phone]    Questions call [phone]

## 2019-03-18 ENCOUNTER — OFFICE VISIT (OUTPATIENT)
Dept: FAMILY MEDICINE CLINIC | Age: 34
End: 2019-03-18

## 2019-03-18 ENCOUNTER — TELEPHONE (OUTPATIENT)
Dept: FAMILY MEDICINE CLINIC | Age: 34
End: 2019-03-18

## 2019-03-18 VITALS
DIASTOLIC BLOOD PRESSURE: 65 MMHG | RESPIRATION RATE: 16 BRPM | TEMPERATURE: 98.5 F | SYSTOLIC BLOOD PRESSURE: 102 MMHG | BODY MASS INDEX: 24.83 KG/M2 | WEIGHT: 149 LBS | HEIGHT: 65 IN | OXYGEN SATURATION: 100 % | HEART RATE: 72 BPM

## 2019-03-18 DIAGNOSIS — S06.0X0D CONCUSSION WITHOUT LOSS OF CONSCIOUSNESS, SUBSEQUENT ENCOUNTER: Primary | ICD-10-CM

## 2019-03-18 DIAGNOSIS — M54.81 OCCIPITAL NEURALGIA OF RIGHT SIDE: ICD-10-CM

## 2019-03-18 DIAGNOSIS — M54.81 OCCIPITAL NEURALGIA OF LEFT SIDE: ICD-10-CM

## 2019-03-18 RX ORDER — TRIAMCINOLONE ACETONIDE 40 MG/ML
20 INJECTION, SUSPENSION INTRA-ARTICULAR; INTRAMUSCULAR ONCE
Qty: 0.5 VIAL | Refills: 0
Start: 2019-03-18 | End: 2019-03-18

## 2019-03-18 RX ORDER — LIDOCAINE HYDROCHLORIDE 10 MG/ML
2 INJECTION, SOLUTION EPIDURAL; INFILTRATION; INTRACAUDAL; PERINEURAL ONCE
Qty: 4 ML | Refills: 0
Start: 2019-03-18 | End: 2019-03-18

## 2019-03-18 RX ORDER — TRIAMCINOLONE ACETONIDE 40 MG/ML
20 INJECTION, SUSPENSION INTRA-ARTICULAR; INTRAMUSCULAR ONCE
Qty: 1 ML | Refills: 0
Start: 2019-03-18 | End: 2019-03-18

## 2019-03-18 RX ORDER — LIDOCAINE HYDROCHLORIDE 10 MG/ML
2 INJECTION INFILTRATION; PERINEURAL ONCE
Qty: 2 ML | Refills: 0
Start: 2019-03-18 | End: 2019-03-18

## 2019-03-18 NOTE — PROGRESS NOTES
History of Present Illness     Patient Identification  Alonzo Graf is a 26 y.o. female here for follow-up on concussion. Still with HA, worse than when last seen. Not taking as much tylenol as when last week. Takes about 3 regular strength tabs a week. Better when she is at home. Doing pencil pushups, tracking drills, horizontal and vertical x's on the wall, started 10mins on the stationary bike twice a week. Still with complaints of on nausea started about 24 days ago. Unchanged from last visit and worse at work. Throughout the day, does not vomiting, recently had her period 2/19/19. Patient also with some fatigue. Especially at work. States she feels some cognitive fatigue after a few hours. Date of Onset: 1/30/19  Mechanism of Injury:  Hit back of her head at work while on sofa      No past medical history on file. Family History   Problem Relation Age of Onset    Cancer Mother         breast    Cancer Father         prostate     Current Outpatient Medications   Medication Sig Dispense Refill    amantadine (SYMMETREL) tab Take one tab a day for 5 days then take twice a day thereafter. 60 Each 1    triamcinolone acetonide (KENALOG-40) 40 mg/mL injection 0.5 mL by Other route once for 1 dose. 0.5 Vial 0    lidocaine, PF, (XYLOCAINE) 10 mg/mL (1 %) injection 2 mL by Other route once for 1 dose. 4 mL 0    triamcinolone acetonide (KENALOG) 40 mg/mL injection 0.5 mL by IntraBURSal route once for 1 dose. 1 mL 0    lidocaine (XYLOCAINE) 10 mg/mL (1 %) injection 2 mL by IntraBURSal route once for 1 dose. 2 mL 0    acetaminophen (TYLENOL PO) Take 500 mg by mouth. No Known Allergies    Review of Systems  A comprehensive review of systems was negative except for that written in the HPI.      Physical Exam     Visit Vitals  /65 (BP 1 Location: Right arm, BP Patient Position: Sitting)   Pulse 72   Temp 98.5 °F (36.9 °C) (Oral)   Resp 16   Ht 5' 5\" (1.651 m)   Wt 149 lb (67.6 kg) LMP 02/19/2019   SpO2 100%   BMI 24.79 kg/m²     General: Alert and oriented, in no acute distress. Responds to all questions appropriately. Lungs: No labored respirations. Talking in  complete sentences without difficulty. Abdomen:  BS normal, NTND, no rebound or guarding      VOMS: Asymptomatic   Near Point Convergence:  20cm, symptomatic, was 15cm last visit   Horizonal-Pursuits- symptomatic  Horizontal Saccades- Asymptomatic, improved    Vertical Saccades- symptomatic  Horizontal VOR - symptomatic   Vertical VOR- symtomatic  VORC- symptomatic    Positive tinels over greater occipital nerve bilaterally     [facility]  OFFICE PROCEDURE PROGRESS NOTE        Chart reviewed for the following:   Maria Elena MARI MD, have reviewed the History, Physical and updated the Allergic reactions for "[employer] performed immediately prior to start of procedure:   Maria Elena MARI MD, have performed the following reviews on [place] prior to the start of the procedure:            * Patient was identified by name and date of birth   * Agreement on procedure being performed was verified  * Risks and Benefits explained to the patient  * Procedure site verified and marked as necessary  * Patient was positioned for comfort  * Consent was signed and verified     Time: 6:40pm      Date of procedure: 3/18/2019    Procedure performed by:  Maria Elena Vora MD    Provider assisted by: Pancho Pedraza LPN    Patient assisted by: brother    How tolerated by patient: tolerated the procedure well with no complications    Post Procedural Pain Scale: 0 - No Hurt    Procedure Note: Bilateral/Left/Right Greater Occipital Nerve Block    Indications: Occipital Neuralgia/Pain    Patient's left and right occipital area was palpated to identify location of greater occipital nerve. Chlorhexadine was applied topically to the skin x 2.  Using a 25 gauge needle (aspirating during insertion), 2.5 cc of a mixture of 20mg Kenalog and 1% lidocaine (0.5cc and 2cc respectively drawn up into syringe) was injected on the left and right side (directing needle to center, left and right of painful focus). Pressure with a gauze pad was held briefly upon the site of puncture to minimize bleeding and to further spread anaesthetic subcutaneously. There were no complications. Patient was comfortable and left without complaint. Felt 60% better after injection. Assessment:    ICD-10-CM ICD-9-CM    1. Concussion without loss of consciousness, subsequent encounter S06.0X0D V58.89 amantadine (SYMMETREL) tab     850.0    2. Occipital neuralgia of left side M54.81 723.8 TRIAMCINOLONE ACETONIDE INJ      triamcinolone acetonide (KENALOG) 40 mg/mL injection      lidocaine (XYLOCAINE) 10 mg/mL (1 %) injection   3. Occipital neuralgia of right side M54.81 723.8 INJECT NERV BLCK,OTHR PERIPH NERV      triamcinolone acetonide (KENALOG-40) 40 mg/mL injection      lidocaine, PF, (XYLOCAINE) 10 mg/mL (1 %) injection      TRIAMCINOLONE ACETONIDE INJ       Plan:  Patient will have her physical therapy notes sent twice. Her convergence insufficiency has worsened since last seen. She did improve with occipital nerve injections bilaterally. We will see how long these last.  We will also start amantadine given her cognitive fatigue. She can go up to twice a day after about 5 days she needs. We will follow-up in about 3-4 weeks. Patient encounter was at least 25 minutes with more than 50 percent of the visit involved in counseling      Follow-up Disposition:  Return in about 1 month (around 4/18/2019) for Post-Concussion Syndrome, 30 min appt.

## 2019-03-18 NOTE — PATIENT INSTRUCTIONS
PLEASE HAVE YOUR PT RECORDS FAXED TO US FOR REVIEW     Postconcussion Syndrome: Care Instructions  Your Care Instructions    Postconcussion syndrome occurs after a blow to the head or body. Common symptoms are changes in the ability to concentrate, think, remember, or solve problems. Symptoms, which may include headaches, personality changes, and dizziness, may be related to stress from the events surrounding the accident that caused the injury. Follow-up care is a key part of your treatment and safety. Be sure to make and go to all appointments, and call your doctor if you are having problems. It's also a good idea to know your test results and keep a list of the medicines you take. How can you care for yourself at home? Pain  · Rest is the best treatment for postconcussion syndrome. · Do not drive if you have taken a prescription pain medicine. · Rest in a quiet, dark room until your headache is gone. Close your eyes and try to relax or go to sleep. Do not watch TV or read. · Put a cold, moist cloth or cold pack on the painful area for 10 to 20 minutes at a time. Put a thin cloth between the cold pack and your skin. · Have someone gently massage your neck and shoulders. · Take your medicines exactly as prescribed. Call your doctor if you think you are having a problem with your medicine. You will get more details on the specific medicines your doctor prescribes. Stress  · Try to reduce stress. Some ways to do this include:  ? Taking slow, deep breaths. ? Soaking in a warm bath. ? Listening to soothing music. ? Taking a yoga class. ? Having a massage or back rub. ? Drinking a warm, nonalcoholic, noncaffeinated beverage. · Get enough sleep. · Eat a healthy, balanced diet. A balanced diet includes whole grains, dairy, fruits and vegetables, and protein. Eat a variety of foods from each of those groups so you get all the nutrients you need. · Avoid alcohol and illegal drugs.   · Try relaxation exercises, such as breathing and muscle relaxation exercises. · Talk to your doctor about counseling. It may help you deal with stress from your accident. When should you call for help? Watch closely for changes in your health, and be sure to contact your doctor if:    · You do not get better as expected.     · Your symptoms, such as headaches, trouble concentrating, or changes in mood, get worse. Where can you learn more? Go to http://roland-agueda.info/. Enter U128 in the search box to learn more about \"Postconcussion Syndrome: Care Instructions. \"  Current as of: Laurie 3, 2018  Content Version: 11.9  © 3293-1175 [employer]. Care instructions adapted under license by [employer] (which disclaims liability or warranty for this information). If you have questions about a medical condition or this instruction, always ask your healthcare professional. [address] any warranty or liability for your use of this information.

## 2019-03-19 DIAGNOSIS — F07.81 POST-CONCUSSIONAL SYNDROME: Primary | ICD-10-CM

## 2019-03-19 RX ORDER — AMANTADINE HYDROCHLORIDE 100 MG/1
TABLET ORAL
Qty: 30 TAB | Refills: 1 | Status: SHIPPED | OUTPATIENT
Start: 2019-03-19 | End: 2019-04-22

## 2019-03-19 NOTE — TELEPHONE ENCOUNTER
----- Message from Melly Guillen sent at 3/18/2019  8:37 PM EDT -----  Regarding: Dr. Paloma Orellana,    Pt is requesting for her prescription for \"Amantadine\" to be sent to the [address] [facility]. Best contact number is [phone].     Thanks,  Benedicto Roca

## 2019-03-19 NOTE — TELEPHONE ENCOUNTER
Patient was seen in office, had prescriptions sent to pharmacy. The amantadine printed. Patient called to let us know that pharmacy didn't have the prescription. I called the pharmacy to see if they would be able to fill it with a call in, per pharmacist they didn't have it in stock. I called patient on her phone to ask her if she could see what pharmacies had it in stock. We were disconnected. I attempted to call the patient back. There was no answer. I left the patient a voicemail letting her know that the prescription is available at the front to be picked up.

## 2019-03-20 NOTE — TELEPHONE ENCOUNTER
Dr. Adarsh Moore / Telephone   Received: Dinorah Maharaj, [address] [facility]   Phone Number: [phone]             Pt requested a return call regarding Rx.

## 2019-04-10 ENCOUNTER — E-ADVICE (OUTPATIENT)
Dept: FAMILY MEDICINE | Age: 34
End: 2019-04-10

## 2019-04-15 ENCOUNTER — E-ADVICE (OUTPATIENT)
Dept: FAMILY MEDICINE | Age: 34
End: 2019-04-15

## 2019-04-21 NOTE — PROGRESS NOTES
History of Present Illness     Patient Identification  Abad Miller is a 26 y.o. female here for follow-up on concussion. No longer nauseous. Has not started amantidine because pharmacy did not have this. States her cognitive fatigue is improving, though it is still there. Started cardio, doing stationary bike 10 mins everyday. Had occipital nerve injections last visit, but states these wore off. Doing pencil pushups, tracking drills with cards. Date of Onset: 1/30/19  Mechanism of Injury:  Hit back of her head at work while on sofa    History reviewed. No pertinent past medical history. Family History   Problem Relation Age of Onset    Cancer Mother         breast    Cancer Father         prostate     Current Outpatient Medications   Medication Sig Dispense Refill    acetaminophen (TYLENOL PO) Take 500 mg by mouth. No Known Allergies    Review of Systems  A comprehensive review of systems was negative except for that written in the HPI. Physical Exam     Visit Vitals  /69 (BP 1 Location: Right arm, BP Patient Position: Sitting)   Pulse 86   Temp 98.8 °F (37.1 °C) (Oral)   Resp 16   Ht 5' 5\" (1.651 m)   Wt 148 lb (67.1 kg)   LMP 04/15/2019   SpO2 100%   BMI 24.63 kg/m²     General: Alert and oriented, in no acute distress. Responds to all questions appropriately. Lungs: No labored respirations. Talking in  complete sentences without difficulty. Abdomen:  BS normal, NTND, no rebound or guarding      VOMS: Asymptomatic   Near Point Convergence:  13cm, symptomatic, was 20cm last visit   Horizonal-Pursuits- symptomatic  Horizontal Saccades- Asymptomatic, improved    Vertical Saccades- symptomatic  Horizontal VOR - symptomatic   Vertical VOR- symtomatic  VORC- symptomatic  Assessment:    ICD-10-CM ICD-9-CM    1. Post-concussional syndrome F07.81 310.2 REFERRAL TO PHYSICAL THERAPY   2.  Concussion without loss of consciousness, subsequent encounter S06.0X0D V58.89 REFERRAL TO PHYSICAL THERAPY     850.0    3. Convergence insufficiency H51.11 378.83 REFERRAL TO PHYSICAL THERAPY   4. Occipital neuralgia of left side M54.81 723.8 REFERRAL TO PHYSICAL THERAPY   5. Occipital neuralgia of right side M54.81 723.8 REFERRAL TO PHYSICAL THERAPY       Plan:  Still have not received any notes from her physical therapist.  Will refer to [facility] physical therapy for better coordination of care. We will hold off on amantadine as she is slowly improving to try off medication. Continue cardio. Does not appear to have an anxiety or depression component based on our exam today. She does have some stress from work. Needs to follow-up during the day with sports medicine.

## 2019-04-22 ENCOUNTER — WALK IN (OUTPATIENT)
Dept: URGENT CARE | Age: 34
End: 2019-04-22

## 2019-04-22 ENCOUNTER — OFFICE VISIT (OUTPATIENT)
Dept: FAMILY MEDICINE CLINIC | Age: 34
End: 2019-04-22

## 2019-04-22 VITALS
HEART RATE: 86 BPM | RESPIRATION RATE: 16 BRPM | BODY MASS INDEX: 24.66 KG/M2 | DIASTOLIC BLOOD PRESSURE: 69 MMHG | HEIGHT: 65 IN | SYSTOLIC BLOOD PRESSURE: 103 MMHG | OXYGEN SATURATION: 100 % | TEMPERATURE: 98.8 F | WEIGHT: 148 LBS

## 2019-04-22 VITALS
HEART RATE: 78 BPM | HEIGHT: 64 IN | SYSTOLIC BLOOD PRESSURE: 106 MMHG | TEMPERATURE: 98.2 F | RESPIRATION RATE: 17 BRPM | OXYGEN SATURATION: 99 % | WEIGHT: 152.12 LBS | BODY MASS INDEX: 25.97 KG/M2 | DIASTOLIC BLOOD PRESSURE: 58 MMHG

## 2019-04-22 DIAGNOSIS — M54.81 OCCIPITAL NEURALGIA OF LEFT SIDE: ICD-10-CM

## 2019-04-22 DIAGNOSIS — S06.0X0D CONCUSSION WITHOUT LOSS OF CONSCIOUSNESS, SUBSEQUENT ENCOUNTER: ICD-10-CM

## 2019-04-22 DIAGNOSIS — F07.81 POST-CONCUSSIONAL SYNDROME: Primary | ICD-10-CM

## 2019-04-22 DIAGNOSIS — H51.11 CONVERGENCE INSUFFICIENCY: ICD-10-CM

## 2019-04-22 DIAGNOSIS — J02.9 PHARYNGITIS, UNSPECIFIED ETIOLOGY: Primary | ICD-10-CM

## 2019-04-22 DIAGNOSIS — J02.9 SORE THROAT: ICD-10-CM

## 2019-04-22 DIAGNOSIS — M54.81 OCCIPITAL NEURALGIA OF RIGHT SIDE: ICD-10-CM

## 2019-04-22 LAB — S PYO AG THROAT QL: NEGATIVE

## 2019-04-22 PROCEDURE — 99202 OFFICE O/P NEW SF 15 MIN: CPT | Performed by: INTERNAL MEDICINE

## 2019-04-22 PROCEDURE — 87077 CULTURE AEROBIC IDENTIFY: CPT | Performed by: INTERNAL MEDICINE

## 2019-04-22 PROCEDURE — 87081 CULTURE SCREEN ONLY: CPT | Performed by: INTERNAL MEDICINE

## 2019-04-22 PROCEDURE — 87880 STREP A ASSAY W/OPTIC: CPT | Performed by: INTERNAL MEDICINE

## 2019-04-22 RX ORDER — AMOXICILLIN 875 MG/1
875 TABLET, COATED ORAL 2 TIMES DAILY
Qty: 20 TABLET | Refills: 0 | Status: SHIPPED | OUTPATIENT
Start: 2019-04-22 | End: 2019-05-02

## 2019-04-22 NOTE — PROGRESS NOTES
Chief Complaint   Patient presents with    Concussion     follow up for concussion, headache 8/10 x 4 hours     1. Have you been to the ER, urgent care clinic since your last visit? Hospitalized since your last visit? No    2. Have you seen or consulted any other health care providers outside of the [address] [place] [address] since your last visit? Include any pap smears or colon screening. No     Reviewed record in preparation for visit and have obtained necessary documentation.

## 2019-04-23 ENCOUNTER — TELEPHONE (OUTPATIENT)
Dept: FAMILY MEDICINE CLINIC | Age: 34
End: 2019-04-23

## 2019-04-25 ENCOUNTER — TELEPHONE (OUTPATIENT)
Dept: URGENT CARE | Age: 34
End: 2019-04-25

## 2019-04-25 LAB
REPORT STATUS (RPT): ABNORMAL
S PYO SPEC QL CULT: ABNORMAL
SPECIMEN SOURCE: ABNORMAL

## 2019-05-07 ENCOUNTER — E-ADVICE (OUTPATIENT)
Dept: FAMILY MEDICINE | Age: 34
End: 2019-05-07

## 2019-05-15 ENCOUNTER — OFFICE VISIT (OUTPATIENT)
Dept: FAMILY MEDICINE CLINIC | Age: 34
End: 2019-05-15

## 2019-05-15 VITALS
DIASTOLIC BLOOD PRESSURE: 66 MMHG | RESPIRATION RATE: 18 BRPM | SYSTOLIC BLOOD PRESSURE: 101 MMHG | WEIGHT: 142 LBS | HEIGHT: 65 IN | OXYGEN SATURATION: 99 % | HEART RATE: 74 BPM | TEMPERATURE: 98.5 F | BODY MASS INDEX: 23.66 KG/M2

## 2019-05-15 DIAGNOSIS — S06.0X0D CONCUSSION WITHOUT LOSS OF CONSCIOUSNESS, SUBSEQUENT ENCOUNTER: ICD-10-CM

## 2019-05-15 NOTE — PROGRESS NOTES
History of Present Illness     Patient Identification  Adelita Alcocer is a 26 y.o. female here for follow-up on concussion. She initially had an injury on 1/30/19 where she hit the back of her head on a sofa at work. She has been doing concussion therapy with [facility] since February, and notes that it generally waxes in wanes in terms of symptoms. Her biggest symptoms throughout this have been headache and neck pain, and she notes that both of them get worse with work. She reports that occular strenuous motions worsen her symptoms as well. No new injury. Generally sleeping okay. Reports that mood appears to be more labile with symptoms, but she denies any depression or anxiety accompanying this. With her rehab, she's been doing visual therapy as well as walking on a treadmill and riding an exercise bike. Symptoms worsen with all of this. No past medical history on file. Family History   Problem Relation Age of Onset    Cancer Mother         breast    Cancer Father         prostate     Current Outpatient Medications   Medication Sig Dispense Refill    acetaminophen (TYLENOL PO) Take 500 mg by mouth. No Known Allergies    Review of Systems  A comprehensive review of systems was negative except for that written in the HPI. Physical Exam     Visit Vitals  Resp 18   Ht 5' 5\" (1.651 m)   Wt 142 lb (64.4 kg)   LMP 04/15/2019   BMI 23.63 kg/m²     General: Alert and oriented, in no acute distress. Responds to all questions appropriately. Neck: Full ROM. Muscular pain throughout the trapezius and  cervical paraspinal muscles without trigger point  Lungs: No labored respirations. Talking in complete sentences without difficulty.       VOMS: Symptomatic   Near Point Convergence:  13cm, symptomatic; similar to last exam  Horizonal-Pursuits- symptomatic  Horizontal Saccades- Symptomatic, improved    Vertical Saccades- symptomatic  Horizontal VOR - symptomatic   Vertical VOR- symtomatic    Able to spell WORLD backwards, able to remember names, numbers, and long term memory points. Able to recall 3 words at 1 and 5 mins. No focal neurologic deficits. Able to track finger to nose. Derek-Devick Test                     Test Number Seconds Errors   1) 21.45     2) 22.24     3) 27.37           B.E.S.S. Score Card     Count Number of Errors   Max of 10 ea. Stance/  Surface    Firm Surface Foam Surface   Double Leg Stance  (Feet Together) 0     Single Leg Stance  (non-dominant foot) 0     Tandem Stance  (non-dominant foot in back) 0     Total Score:                  Assessment:    ICD-10-CM ICD-9-CM    1. Concussion without loss of consciousness, subsequent encounter S06.0X0D V58.89 REFERRAL TO PHYSICAL THERAPY     850.0 amantadine (SYMMETREL) tab       Patient presenting with continued concussion symptoms, ongoing for the last 4 months. Not getting consistent improvement, and upset about symptoms continuing. On exam, no other clear secondary issue or complicating diagnosis appears to be present. At times in the room patient was tearful, but when I asked deeper questions about anxiety/depression symptoms she declined them, but noted that she has a significant stressor of work. For now, we'll change her concussion therapy to [employer] while she's stagnated on improvement, and we'll also impliment amantadine as discussed previously by Dr. Jimi Weber.  With the clear stress and possible remaining psychosocial issues going on with patient, she would greatly benefit from seeing a consistent provider who would be able to build a relationship with her.

## 2019-05-15 NOTE — PROGRESS NOTES
Chief Complaint   Patient presents with    Follow-up     Concussion     Derek-Devick Test             Test Number Seconds Errors   1) 21.45    2) 22.24    3) 27.37         B.E.S.S. Score Card    Count Number of Errors   Max of 10 ea.  Stance/  Surface   Firm Surface Foam Surface   Double Leg Stance  (Feet Together) 0    Single Leg Stance  (non-dominant foot) 0    Tandem Stance  (non-dominant foot in back) 0    Total Score:

## 2019-05-22 ENCOUNTER — TELEPHONE (OUTPATIENT)
Dept: FAMILY MEDICINE CLINIC | Age: 34
End: 2019-05-22

## 2019-05-22 NOTE — TELEPHONE ENCOUNTER
An with [address] [place] [address] calling from ph [phone]    Asking to speak with Dr. Tavo Velasco about a form that she received.  States there are some things she doesn't understand    Call transferred    Office ph [phone]

## 2019-05-28 ENCOUNTER — TELEPHONE (OUTPATIENT)
Dept: FAMILY MEDICINE CLINIC | Age: 34
End: 2019-05-28

## 2019-05-28 NOTE — TELEPHONE ENCOUNTER
Called patient back. When I introduced myself and asked if I could help patient asked \"Where is Dr. Jose J Graff". I told her that he was precepting residents in the clinic today. She was adamant about speaking with Dr. Pretty Escalona personally. I asked if I could tell him anything specifically, she would not disclose any information to me. Will forward to Dr. Pretty Escalona to contact patient.     Patient's number [phone]

## 2019-05-28 NOTE — TELEPHONE ENCOUNTER
[phone]  Patient returned call. She was advised the nurse is to contact her. I asked about her concerns and the patient hung up the phone.

## 2019-05-28 NOTE — TELEPHONE ENCOUNTER
Patient wants Dr. Moe Hawkins to call her. Asked for details and she declined.     Call/ [phone]

## 2019-06-13 ENCOUNTER — WALK IN (OUTPATIENT)
Dept: URGENT CARE | Age: 34
End: 2019-06-13

## 2019-06-13 ENCOUNTER — IMAGING SERVICES (OUTPATIENT)
Dept: GENERAL RADIOLOGY | Age: 34
End: 2019-06-13
Attending: PHYSICIAN ASSISTANT

## 2019-06-13 DIAGNOSIS — S63.642A SPRAIN OF METACARPOPHALANGEAL (MCP) JOINT OF LEFT THUMB, INITIAL ENCOUNTER: ICD-10-CM

## 2019-06-13 DIAGNOSIS — S69.92XA INJURY OF LEFT THUMB, INITIAL ENCOUNTER: Primary | ICD-10-CM

## 2019-06-13 DIAGNOSIS — S69.92XA INJURY OF LEFT THUMB, INITIAL ENCOUNTER: ICD-10-CM

## 2019-06-13 PROCEDURE — L3809 WHFO W/O JOINTS PRE OTS: HCPCS | Performed by: PHYSICIAN ASSISTANT

## 2019-06-13 PROCEDURE — 99214 OFFICE O/P EST MOD 30 MIN: CPT | Performed by: PHYSICIAN ASSISTANT

## 2019-06-13 PROCEDURE — 73140 X-RAY EXAM OF FINGER(S): CPT | Performed by: RADIOLOGY

## 2019-06-13 RX ORDER — IBUPROFEN 200 MG
600 TABLET ORAL ONCE
Status: COMPLETED | OUTPATIENT
Start: 2019-06-13 | End: 2019-06-13

## 2019-06-13 RX ADMIN — Medication 600 MG: at 21:32

## 2019-06-13 ASSESSMENT — PAIN SCALES - GENERAL: PAINLEVEL: 7-8

## 2019-06-17 ENCOUNTER — OFFICE VISIT (OUTPATIENT)
Dept: FAMILY MEDICINE | Age: 34
End: 2019-06-17

## 2019-06-17 VITALS
RESPIRATION RATE: 12 BRPM | OXYGEN SATURATION: 95 % | WEIGHT: 153.11 LBS | HEART RATE: 72 BPM | SYSTOLIC BLOOD PRESSURE: 98 MMHG | BODY MASS INDEX: 26.14 KG/M2 | HEIGHT: 64 IN | DIASTOLIC BLOOD PRESSURE: 66 MMHG

## 2019-06-17 DIAGNOSIS — S69.92XD INJURY OF LEFT THUMB, SUBSEQUENT ENCOUNTER: Primary | ICD-10-CM

## 2019-06-17 DIAGNOSIS — Z86.69 HISTORY OF MIGRAINE HEADACHES: ICD-10-CM

## 2019-06-17 PROCEDURE — 99213 OFFICE O/P EST LOW 20 MIN: CPT | Performed by: STUDENT IN AN ORGANIZED HEALTH CARE EDUCATION/TRAINING PROGRAM

## 2019-06-21 ENCOUNTER — WALK IN (OUTPATIENT)
Dept: URGENT CARE | Age: 34
End: 2019-06-21

## 2019-06-21 VITALS
HEIGHT: 64 IN | BODY MASS INDEX: 25.42 KG/M2 | OXYGEN SATURATION: 100 % | WEIGHT: 148.92 LBS | SYSTOLIC BLOOD PRESSURE: 97 MMHG | DIASTOLIC BLOOD PRESSURE: 71 MMHG | TEMPERATURE: 97.8 F | HEART RATE: 72 BPM | RESPIRATION RATE: 16 BRPM

## 2019-06-21 DIAGNOSIS — H02.846 EDEMA OF LEFT EYELID: Primary | ICD-10-CM

## 2019-06-21 PROCEDURE — 99214 OFFICE O/P EST MOD 30 MIN: CPT | Performed by: NURSE PRACTITIONER

## 2019-06-21 RX ORDER — PREDNISONE 20 MG/1
40 TABLET ORAL DAILY
Qty: 10 TABLET | Refills: 0 | Status: SHIPPED | OUTPATIENT
Start: 2019-06-21 | End: 2019-06-26

## 2019-06-21 RX ORDER — CETIRIZINE HYDROCHLORIDE 10 MG/1
10 TABLET ORAL ONCE
Status: COMPLETED | OUTPATIENT
Start: 2019-06-21 | End: 2019-06-21

## 2019-06-21 RX ADMIN — CETIRIZINE HYDROCHLORIDE 10 MG: 10 TABLET ORAL at 10:14

## 2019-06-21 ASSESSMENT — ENCOUNTER SYMPTOMS
EYE ITCHING: 0
COUGH: 0
SORE THROAT: 0
CHILLS: 0
CHEST TIGHTNESS: 0
PHOTOPHOBIA: 0
STRIDOR: 0
EYE PAIN: 0
WHEEZING: 0
EYE REDNESS: 0
SHORTNESS OF BREATH: 0
TROUBLE SWALLOWING: 0
FACIAL SWELLING: 1
FEVER: 0
FATIGUE: 0
EYE DISCHARGE: 0

## 2019-07-01 ENCOUNTER — OFFICE VISIT (OUTPATIENT)
Dept: FAMILY MEDICINE CLINIC | Age: 34
End: 2019-07-01

## 2019-07-01 VITALS
RESPIRATION RATE: 18 BRPM | HEIGHT: 65 IN | TEMPERATURE: 98.4 F | BODY MASS INDEX: 23.99 KG/M2 | SYSTOLIC BLOOD PRESSURE: 100 MMHG | DIASTOLIC BLOOD PRESSURE: 65 MMHG | OXYGEN SATURATION: 100 % | WEIGHT: 144 LBS | HEART RATE: 89 BPM

## 2019-07-01 DIAGNOSIS — M54.2 NECK PAIN: ICD-10-CM

## 2019-07-01 DIAGNOSIS — F07.81 POST-CONCUSSIONAL SYNDROME: Primary | ICD-10-CM

## 2019-07-01 DIAGNOSIS — S06.0X0D CONCUSSION WITHOUT LOSS OF CONSCIOUSNESS, SUBSEQUENT ENCOUNTER: ICD-10-CM

## 2019-07-01 DIAGNOSIS — M54.81 OCCIPITAL NEURALGIA OF LEFT SIDE: ICD-10-CM

## 2019-07-01 DIAGNOSIS — M54.81 OCCIPITAL NEURALGIA OF RIGHT SIDE: ICD-10-CM

## 2019-07-01 NOTE — PROGRESS NOTES
Identified Patient with two Patient identifiers (Name and ). Two Patient Identifiers confirmed. Reviewed record in preparation for visit and have obtained necessary documentation. Chief Complaint   Patient presents with    Concussion     follow up - head pain/eye pain       Visit Vitals  /65 (BP 1 Location: Right arm, BP Patient Position: Sitting)   Pulse 89   Temp 98.4 °F (36.9 °C) (Oral)   Resp 18   Ht 5' 5\" (1.651 m)   Wt 144 lb (65.3 kg)   SpO2 100%   BMI 23.96 kg/m²       1. Have you been to the ER, urgent care clinic since your last visit? Hospitalized since your last visit? No    2. Have you seen or consulted any other health care providers outside of the [address] [place] [address] since your last visit? Include any pap smears or colon screening.  No

## 2019-07-01 NOTE — PATIENT INSTRUCTIONS
Postconcussion Syndrome: Care Instructions  Your Care Instructions    Postconcussion syndrome occurs after a blow to the head or body. Common symptoms are changes in the ability to concentrate, think, remember, or solve problems. Symptoms, which may include headaches, personality changes, and dizziness, may be related to stress from the events surrounding the accident that caused the injury. Follow-up care is a key part of your treatment and safety. Be sure to make and go to all appointments, and call your doctor if you are having problems. It's also a good idea to know your test results and keep a list of the medicines you take. How can you care for yourself at home? Pain  · Rest is the best treatment for postconcussion syndrome. · Do not drive if you have taken a prescription pain medicine. · Rest in a quiet, dark room until your headache is gone. Close your eyes and try to relax or go to sleep. Do not watch TV or read. · Put a cold, moist cloth or cold pack on the painful area for 10 to 20 minutes at a time. Put a thin cloth between the cold pack and your skin. · Have someone gently massage your neck and shoulders. · Take your medicines exactly as prescribed. Call your doctor if you think you are having a problem with your medicine. You will get more details on the specific medicines your doctor prescribes. Stress  · Try to reduce stress. Some ways to do this include:  ? Taking slow, deep breaths. ? Soaking in a warm bath. ? Listening to soothing music. ? Taking a yoga class. ? Having a massage or back rub. ? Drinking a warm, nonalcoholic, noncaffeinated beverage. · Get enough sleep. · Eat a healthy, balanced diet. A balanced diet includes whole grains, dairy, fruits and vegetables, and protein. Eat a variety of foods from each of those groups so you get all the nutrients you need. · Avoid alcohol and illegal drugs.   · Try relaxation exercises, such as breathing and muscle relaxation exercises. · Talk to your doctor about counseling. It may help you deal with stress from your accident. When should you call for help? Watch closely for changes in your health, and be sure to contact your doctor if:    · You do not get better as expected.     · Your symptoms, such as headaches, trouble concentrating, or changes in mood, get worse. Where can you learn more? Go to http://roland-agueda.info/. Enter B011 in the search box to learn more about \"Postconcussion Syndrome: Care Instructions. \"  Current as of: Laurie 3, 2018  Content Version: 11.9  © 1947-6738 [employer]. Care instructions adapted under license by [employer] (which disclaims liability or warranty for this information). If you have questions about a medical condition or this instruction, always ask your healthcare professional. [address] any warranty or liability for your use of this information.

## 2019-07-01 NOTE — PROGRESS NOTES
History of Present Illness     Patient Identification  Jac Leone is a 26 y.o. female here for follow-up on concussion. No longer nauseous. Has not started amantidine because pharmacy did not have this. States her cognitive fatigue is not improved. She has been taking breaks at work that have been helpful. She is frustrated with her progress. Continues to have headaches and eye pressure. Does not take ibuprofen or Tylenol daily. She was started on nortriptyline by another physician and states it did not help. Date of Onset: 1/30/19  Mechanism of Injury:  Hit back of her head on the wall from a whiplash motion on 1/30 at work        No past medical history on file. Family History   Problem Relation Age of Onset    Cancer Mother         breast    Cancer Father         prostate     Current Outpatient Medications   Medication Sig Dispense Refill    amantadine (SYMMETREL) tab Take one tab a day for 5 days then take twice a day thereafter. 60 Each 1    acetaminophen (TYLENOL PO) Take 500 mg by mouth. No Known Allergies    Review of Systems  A comprehensive review of systems was negative except for that written in the HPI. Physical Exam     Visit Vitals  /65 (BP 1 Location: Right arm, BP Patient Position: Sitting)   Pulse 89   Temp 98.4 °F (36.9 °C) (Oral)   Resp 18   Ht 5' 5\" (1.651 m)   Wt 144 lb (65.3 kg)   LMP 06/04/2019 (Exact Date)   SpO2 100%   BMI 23.96 kg/m²     General: Alert and oriented, in no acute distress. Responds to all questions appropriately. Lungs: No labored respirations. Talking in  complete sentences without difficulty.   Abdomen:  BS normal, NTND, no rebound or guarding      VOMS: Asymptomatic   [place] Convergence:  10cm now, symptomatic, was 20cm last visit   Horizonal-Pursuits- symptomatic, HA  Horizontal Saccades- symptomatic, HA    Vertical Saccades- symptomatic, eye pressure  Horizontal VOR - symptomatic, eye pressure   Vertical VOR- symtomatic, HA and dizziness  VORC- symptomatic, HA and dizziness  Assessment:    ICD-10-CM ICD-9-CM    1. Post-concussional syndrome F07.81 310.2 REFERRAL TO PHYSICAL THERAPY   2. Neck pain M54.2 723.1 REFERRAL TO PHYSICAL THERAPY   3. Occipital neuralgia of left side M54.81 723.8 REFERRAL TO PHYSICAL THERAPY   4. Occipital neuralgia of right side M54.81 723.8 REFERRAL TO PHYSICAL THERAPY   5. Concussion without loss of consciousness, subsequent encounter S06.0X0D V58.89 amantadine (SYMMETREL) tab     850.0        Plan:    CI improved. We will patient desires to have a new set of eyes to help manage her concussions. This may be beneficial.  Will refer to a new physical therapist.  I believe she will benefit from amantadine and encouraged to continue to take breaks as needed. Had a long discussion with the patient about prognosis and course of postconcussion syndrome. She is making slow progress but I believe she will continue to benefit from symptomatic treatment with potential medications and therapy. Patient encounter was at least 25 minutes with more than 50 percent of the visit involved in counseling      Follow-up and Dispositions    · Return in about 1 month (around 8/1/2019) for Concussion .

## 2019-07-12 ENCOUNTER — TELEPHONE (OUTPATIENT)
Dept: FAMILY MEDICINE CLINIC | Age: 34
End: 2019-07-12

## 2019-07-12 NOTE — TELEPHONE ENCOUNTER
Pt called and stated that she would like to speak with Dr Velma Danielle regarding work and her medication. Pt refused to give any other details. Pt knows Dr Velma Danielle will be in on Monday. Pt can be reached at [phone].

## 2019-07-17 ENCOUNTER — HOSPITAL ENCOUNTER (OUTPATIENT)
Dept: PHYSICAL THERAPY | Age: 34
Discharge: HOME | End: 2019-07-17
Payer: COMMERCIAL

## 2019-07-17 PROCEDURE — 97112 NEUROMUSCULAR REEDUCATION: CPT | Performed by: PHYSICAL THERAPIST

## 2019-07-17 PROCEDURE — 97162 PT EVAL MOD COMPLEX 30 MIN: CPT | Performed by: PHYSICAL THERAPIST

## 2019-07-17 PROCEDURE — 97140 MANUAL THERAPY 1/> REGIONS: CPT | Performed by: PHYSICAL THERAPIST

## 2019-07-17 NOTE — PROGRESS NOTES
PT INITIAL EVALUATION NOTE 2-15    Patient Name: Kenneth Garrido  Date:2019  : 1985  [x]  Patient  Verified  Payor: Tyree Cruz / Plan: Brody Magdiel / Product Type: HMO /    In time:3:40 PM  Out time:4:50 PM  Total Treatment Time (min): 70  Visit #: 1     Treatment Area: Concussion [S06.0X9A]    SUBJECTIVE  Pain Level (0-10 scale): 8/10, headache  Any medication changes, allergies to medications, adverse drug reactions, diagnosis change, or new procedure performed?: [] No    [x] Yes (see summary sheet for update)  Subjective:     Pt hit her head on a wall on a ledge 19. Pt reports she laid down and when she got up it was the worst pain she could imagine. \"I just went to [employer] coworkers said that I was repeating myself about my head hurting. My vision was blurry. \"  Pt reports she went to her MD the next day, \"he did a few tests and said I had a concussion. He told me to rest for 3 days. I did that went back to work and felt like I was worse than before. \" \"I had to leave work everyday early. \" \"I went back to the doctor who took her out for another week. Every time I tried to go back it was worse. Pt is working every day but it hasn't gotten much better. The past month it was worse. \"  Pt has tried concussion PT at [address] [place] [address] since . Pt was doing occulomotor exercises and she got up to 9 minutes on the treadmill. Pt reports she has R sided arm and neck pain, upper back pain on both sides. Pt has numbness/ tingling in her R leg and R arm.      Pt gets very sleepy \"I feel like I get enough sleep at night\"  Light sensitivty has increased   Pt fell on the ice \"I was out for hours\"  2-3 years ago   \"Very irritable\" \"difficulty focusing\"  Trouble going up and down stairs  PLOF: Pt enjoys singing and dancing, she works full time, 13 years    Mechanism of Injury: Hit her head  Previous Treatment/Compliance: Yes  PMHx/Surgical Hx: hx of back pain and headaches  Work Hx: Pt works full time for [employer]  Living Situation: 3 story home  Pt Goals: \"everything to go back to normal\"  Barriers: Chronic nature of condition, hx of concussion  Motivation: good  Substance use: none   Cognition: A & O x 3          OBJECTIVE:  Observations:   Other observations: sunglasses donned  Cervical AROM:  Flexion 25  Extension 25  Side Bend R 25 L 20  Rotation 65 deg B  Oculomotor examination:              Smooth Pursuit:                            Horizontal: Dizziness, \"I feel weird\" Saccades                          Vertical: eye and head pain, saccades              Gaze stabilization:                            Horizontal: \"my eyes hurt\"                          Vertical: \"weird\"              PDHSHUWA:                                 Horizontal: NT                          Vertical: NT              Convergence: NT  Vertebral Artery Test: Negative  Balance Tests:   Rhomberg: EO 30 EC 30   Sharpened Rhomberg: EO 30 EC 5      Modality rationale: decrease inflammation, decrease pain and increase tissue extensibility to improve the patients ability to sit, stand, transfer, ambulate, lift, carry, reach, complete ADLs   Min Type Additional Details    [] Estim: []Att   []Unatt        []TENS instruct                  []IFC  []Premod   []NMES                     []Other:  []w/US   []w/ice   []w/heat  Position:  Location:    []  Traction: [] Cervical       []Lumbar                       [] Prone          []Supine                       []Intermittent   []Continuous Lbs:  [] before manual  [] after manual  []w/heat    []  Ultrasound: []Continuous   [] Pulsed at:                            []1MHz   []3MHz Location:  W/cm2:    []  Paraffin         Location:  []w/heat   15 [x]  Ice     [x]  Heat  []  Ice massage Position: supine  Location: head and neck    []  Laser  []  Other: Position:  Location:    []  Vasopneumatic Device Pressure:       [] lo [] med [] hi   Temperature:    [x] Skin assessment post-treatment: [x]intact []redness- no adverse reaction    []redness  adverse reaction:     10 min Neuromuscular Re-education:  [x]  See flow sheet :   Rationale: improve coordination, improve balance and increase proprioception  to improve the patients ability to sit, stand, transfer, ambulate, lift, carry, reach, complete ADLs    10 min Manual Therapy:  Infraclavicular pumping, c-spine PROM to tolerance, MFR B UT, B levator   Rationale: decrease pain, increase ROM, increase tissue extensibility and decrease trigger points  to improve the patients ability to sit, stand, transfer, ambulate, lift, carry, reach, complete ADLs        With   [x] TE   [] TA   [x] neuro   [] other: Patient Education: [x] Review HEP    [] Progressed/Changed HEP based on:   [x] positioning   [x] body mechanics   [] transfers   [x] heat/ice application    [] other:        Pain Level (0-10 scale) post treatment: 6      ASSESSMENT:      [x]  See Plan of [address] [place] [address], PT 7/17/2019

## 2019-07-17 NOTE — PROGRESS NOTES
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 Assessment/ key information: The patient presents with post concussion symptoms of headaches, dizziness, imbalance, oculomotor dysfunction, dififculty focusing, irritability, light sensitivity, and fatigue, affecting her ability to home school her daughter, perform work tasks, and complete household chores. The patient's condition is complicated by chronic nature of condition. Evaluation Complexity History MEDIUM  Complexity : 1-2 comorbidities / personal factors will impact the outcome/ POC ; Examination HIGH Complexity : 4+ Standardized tests and measures addressing body structure, function, activity limitation and / or participation in recreation  ;Presentation MEDIUM Complexity : Evolving with changing characteristics  ; Clinical Decision Making MEDIUM Complexity : FOTO score of 26-74  Overall Complexity Rating: MEDIUM    Problem List: pain affecting function, decrease ROM, decrease strength, edema affecting function, impaired gait/ balance, decrease ADL/ functional abilitiies, decrease activity tolerance, decrease flexibility/ joint mobility and decrease transfer abilities   Treatment Plan may include any combination of the following: Therapeutic exercise, Neuromuscular re-education, Physical agent/modality, Gait/balance training, Manual therapy, Patient education and Functional mobility training  Patient / Family readiness to learn indicated by: asking questions, trying to perform skills and interest  Persons(s) to be included in education: patient (P)  Barriers to Learning/Limitations: None  Patient Goal (s): back to normal  Patient Self Reported Health Status: fair  Rehabilitation Potential: good    Short Term Goals: To be accomplished in 4 weeks:  1) The patient will be independent with introductory HEP  2) The patient will improve the [place] Post Concussion Symptoms Scale to 46/64 or greater to indicate a significant improvement in function  3) The patient will report ability to complete a half day of work without an increase in symptoms  Long Term Goals: To be accomplished in 12 weeks:  1) The patient will complete a full day of work without an increase in symptoms  2) The patient will report ability to complete household chores without an increase in symptoms  3) The patient will resume fitness routine without an increase in symptoms  Frequency / Duration: Patient to be seen 2 times per week for 12 weeks. Patient/ Caregiver education and instruction: self care, activity modification and exercises    [x]  Plan of care has been reviewed with LOIS Tang, PT 7/17/2019     ________________________________________________________________________    I certify that the above Therapy Services are being furnished while the patient is under my care. I agree with the treatment plan and certify that this therapy is necessary.     [de-identified] Signature:____________________  Date:____________Time: _________

## 2019-07-22 ENCOUNTER — HOSPITAL ENCOUNTER (OUTPATIENT)
Dept: PHYSICAL THERAPY | Age: 34
Discharge: HOME | End: 2019-07-22
Payer: COMMERCIAL

## 2019-07-22 PROCEDURE — 97112 NEUROMUSCULAR REEDUCATION: CPT | Performed by: PHYSICAL MEDICINE & REHABILITATION

## 2019-07-22 PROCEDURE — 97140 MANUAL THERAPY 1/> REGIONS: CPT | Performed by: PHYSICAL MEDICINE & REHABILITATION

## 2019-07-22 PROCEDURE — 97110 THERAPEUTIC EXERCISES: CPT | Performed by: PHYSICAL MEDICINE & REHABILITATION

## 2019-07-22 NOTE — PROGRESS NOTES
PT DAILY TREATMENT NOTE - [facility] 2-15    Patient Name: Army Flores  Date:2019  : 1985  [x]  Patient  Verified  Payor: Nanette Carver / Plan: Jaja Valdez / Product Type: HMO /    In time:430 pm  Out time:530 pm  Total Treatment Time (min): 60  Total Timed Codes (min): 45  1:1 Treatment Time ([facility] only): 30   Visit #: 2      Treatment Area: Concussion [S06.0X9A]    SUBJECTIVE  Pain Level (0-10 scale): 8/10 HA  Any medication changes, allergies to medications, adverse drug reactions, diagnosis change, or new procedure performed?: [x] No    [] Yes (see summary sheet for update)  Subjective functional status/changes:   [] No changes reported  Patient reported balance exercises are still challenging and didn't do card exercise at home. Patient states she wake up with 4/10 HA and by the end of the work day it's up to 8/10.     OBJECTIVE    Modality rationale: decrease inflammation, decrease pain and increase tissue extensibility to improve the patients ability to ADLs, standing and walking tolerance   Min Type Additional Details    [] Estim: []Att   []Unatt        []TENS instruct                  []IFC  []Premod   []NMES                     []Other:  []w/US   []w/ice   []w/heat  Position:  Location:    []  Traction: [] Cervical       []Lumbar                       [] Prone          []Supine                       []Intermittent   []Continuous Lbs:  [] before manual  [] after manual  []w/heat    []  Ultrasound: []Continuous   [] Pulsed at:                           []1MHz   []3MHz Location:  W/cm2:    [] Paraffin         Location:   []w/heat   15 [x]  Ice     [x]  Heat  []  Ice massage Position: supine  Location: head and neck    []  Laser  []  Other: Position:  Location:      []  Vasopneumatic Device Pressure:       [] lo [] med [] hi   Temperature:      [x] Skin assessment post-treatment:  [x]intact []redness- no adverse reaction    []redness  adverse reaction:     15 min Therapeutic Exercise:  [x] See flow sheet :   Rationale: increase ROM and improve coordination to improve the patients ability to ADLs, work tolerance     15 min Neuromuscular Re-education:  [x]  See flow sheet :   Rationale: improve coordination, improve balance and increase proprioception  to improve the patients ability to ADLs, work tolerance    15 min Manual Therapy:  SOR, cervical traction, UT stretch, P/A mobs gd III/IV   Rationale: decrease pain, increase ROM and increase tissue extensibility to improve the patients ability to ADLs, work tolerance          With   [x] TE   [] TA   [] neuro   [] other: Patient Education: [x] Review HEP    [] Progressed/Changed HEP based on:   [] positioning   [] body mechanics   [] transfers   [] heat/ice application    [] other:      Other Objective/Functional Measures: VORx1 Vertical: dizziness present  VORx1 Horizontal: less dizziness than vertical  Position: [] Standing   [x] Sitting   [] Walking   Background: [x] Blank   [] Curtain   [] Pink Dots   []   Recovery Time:2 minutes     Mod difficulty with balance exercises today. Mod sway present. Pain Level (0-10 scale) post treatment: 2/10    ASSESSMENT/Changes in Function:     Patient will continue to benefit from skilled PT services to modify and progress therapeutic interventions, address functional mobility deficits, address ROM deficits, address strength deficits, analyze and address soft tissue restrictions, analyze and cue movement patterns, analyze and modify body mechanics/ergonomics, assess and modify postural abnormalities and address imbalance/dizziness to attain remaining goals. []  See Plan of Care  []  See progress note/recertification  []  See Discharge Summary         Progress towards goals / Updated goals:  Patient is progressing slowly towards goals due to continued high HA levels following work days.      PLAN  [x]  Upgrade activities as tolerated     [x]  Continue plan of care  [x]  Update interventions per flow sheet       []  Discharge due to:_  []  Other:_      Brittany Pérez PTA, CPT 7/22/2019

## 2019-07-23 NOTE — TELEPHONE ENCOUNTER
----- Message from Nino Villafuerte sent at 7/23/2019  7:19 AM EDT -----  Regarding: Dr. Sarah Thomas: [phone]  Pt requesting callback concerning medication and work.  This is the second request.

## 2019-07-24 ENCOUNTER — HOSPITAL ENCOUNTER (OUTPATIENT)
Dept: PHYSICAL THERAPY | Age: 34
Discharge: HOME | End: 2019-07-24
Payer: COMMERCIAL

## 2019-07-24 PROCEDURE — 97112 NEUROMUSCULAR REEDUCATION: CPT | Performed by: PHYSICAL MEDICINE & REHABILITATION

## 2019-07-24 PROCEDURE — 97140 MANUAL THERAPY 1/> REGIONS: CPT | Performed by: PHYSICAL MEDICINE & REHABILITATION

## 2019-07-24 PROCEDURE — 97110 THERAPEUTIC EXERCISES: CPT | Performed by: PHYSICAL MEDICINE & REHABILITATION

## 2019-07-24 NOTE — PROGRESS NOTES
PT DAILY TREATMENT NOTE - [facility] 2-15    Patient Name: Cristina First  Date:2019  : 1985  [x]  Patient  Verified  Payor: Monica Zafar / Plan: Alessandra Olivo / Product Type: HMO /    In time:430 pm  Out time:530 pm  Total Treatment Time (min): 60  Total Timed Codes (min): 45  1:1 Treatment Time ([address] only): 39   Visit #: 3      Treatment Area: Concussion [N33.1Q6V]    SUBJECTIVE  Pain Level (0-10 scale): 7/10 HA  Any medication changes, allergies to medications, adverse drug reactions, diagnosis change, or new procedure performed?: [x] No    [] Yes (see summary sheet for update)  Subjective functional status/changes:   [] No changes reported  Patient reported her HA increases immediately when she gets to work.      OBJECTIVE    Modality rationale: decrease inflammation, decrease pain and increase tissue extensibility to improve the patients ability to ADLs, standing and walking tolerance   Min Type Additional Details    [] Estim: []Att   []Unatt        []TENS instruct                  []IFC  []Premod   []NMES                     []Other:  []w/US   []w/ice   []w/heat  Position:  Location:    []  Traction: [] Cervical       []Lumbar                       [] Prone          []Supine                       []Intermittent   []Continuous Lbs:  [] before manual  [] after manual  []w/heat    []  Ultrasound: []Continuous   [] Pulsed at:                           []1MHz   []3MHz Location:  W/cm2:    [] Paraffin         Location:   []w/heat   15 [x]  Ice     [x]  Heat  []  Ice massage Position: supine  Location: head and neck    []  Laser  []  Other: Position:  Location:      []  Vasopneumatic Device Pressure:       [] lo [] med [] hi   Temperature:      [x] Skin assessment post-treatment:  [x]intact []redness- no adverse reaction    []redness  adverse reaction:     15 min Therapeutic Exercise:  [x] See flow sheet :   Rationale: increase ROM and improve coordination to improve the patients ability to ADLs, work tolerance     15 min Neuromuscular Re-education:  [x]  See flow sheet :   Rationale: improve coordination, improve balance and increase proprioception  to improve the patients ability to ADLs, work tolerance    15 min Manual Therapy:  SOR, cervical traction, UT stretch, P/A mobs gd III/IV   Rationale: decrease pain, increase ROM and increase tissue extensibility to improve the patients ability to ADLs, work tolerance          With   [x] TE   [] TA   [] neuro   [] other: Patient Education: [x] Review HEP    [] Progressed/Changed HEP based on:   [] positioning   [] body mechanics   [] transfers   [] heat/ice application    [] other:      Other Objective/Functional Measures: VORx1 Vertical: mod dizziness present  VORx1 Horizontal: min dizziness  Position: [] Standing   [x] Sitting   [] Walking   Background: [x] Blank   [] Curtain   [] Pink Dots   []   Recovery Time:2.5-3 minutes     Able to progress EO balance. SLS with no LOB but mod sway. EC balance - mod sway present. Pain Level (0-10 scale) post treatment: 6/10    ASSESSMENT/Changes in Function:     Patient will continue to benefit from skilled PT services to modify and progress therapeutic interventions, address functional mobility deficits, address ROM deficits, address strength deficits, analyze and address soft tissue restrictions, analyze and cue movement patterns, analyze and modify body mechanics/ergonomics, assess and modify postural abnormalities and address imbalance/dizziness to attain remaining goals. []  See Plan of Care  []  See progress note/recertification  []  See Discharge Summary         Progress towards goals / Updated goals:  Patient is progressing slowly towards goals due to continued high HA levels following work days but also isn't participating with HEP performance.      PLAN  [x]  Upgrade activities as tolerated     [x]  Continue plan of care  [x]  Update interventions per flow sheet       []  Discharge due to:_  [] Other:_      Real Prude, PTA, CPT 7/24/2019

## 2019-07-30 ENCOUNTER — HOSPITAL ENCOUNTER (OUTPATIENT)
Dept: PHYSICAL THERAPY | Age: 34
Discharge: HOME | End: 2019-07-30
Payer: COMMERCIAL

## 2019-07-30 PROCEDURE — 97140 MANUAL THERAPY 1/> REGIONS: CPT | Performed by: PHYSICAL THERAPIST

## 2019-07-30 PROCEDURE — 97112 NEUROMUSCULAR REEDUCATION: CPT | Performed by: PHYSICAL THERAPIST

## 2019-07-30 NOTE — PROGRESS NOTES
PT DAILY TREATMENT NOTE - [facility] 2-15    Patient Name: Paty Newby  Date:2019  : 1985  [x]  Patient  Verified  Payor: Darnell Correa / Plan: Loly Welsh / Product Type: HMO /    In time: 8:30 AM  Out time: 9:30 AM  Total Treatment Time (min): 60  Total Timed Codes (min): 45  1:1 Treatment Time ( only): 35  Visit #: 4      Treatment Area: Concussion [S06.0X9A]    SUBJECTIVE  Pain Level (0-10 scale): 3/10  Any medication changes, allergies to medications, adverse drug reactions, diagnosis change, or new procedure performed?: [x] No    [] Yes (see summary sheet for update)  Subjective functional status/changes:   [] No changes reported  Patient reports she hasn't been getting breaks at work because they need a note from her MD.  Pt reports she started having sharp pains in her head.   Pt reports she has not been compliant with HEP daily    OBJECTIVE    Modality rationale: decrease inflammation, decrease pain and increase tissue extensibility to improve the patients ability to ADLs, standing and walking tolerance   Min Type Additional Details    [] Estim: []Att   []Unatt        []TENS instruct                  []IFC  []Premod   []NMES                     []Other:  []w/US   []w/ice   []w/heat  Position:  Location:    []  Traction: [] Cervical       []Lumbar                       [] Prone          []Supine                       []Intermittent   []Continuous Lbs:  [] before manual  [] after manual  []w/heat    []  Ultrasound: []Continuous   [] Pulsed at:                           []1MHz   []3MHz Location:  W/cm2:    [] Paraffin         Location:   []w/heat   15 [x]  Ice     [x]  Heat  []  Ice massage Position: supine  Location: head and neck    []  Laser  []  Other: Position:  Location:      []  Vasopneumatic Device Pressure:       [] lo [] med [] hi   Temperature:      [x] Skin assessment post-treatment:  [x]intact []redness- no adverse reaction    []redness  adverse reaction:     15 min Therapeutic Exercise:  [x] See flow sheet :   Rationale: increase ROM and improve coordination to improve the patients ability to ADLs, work tolerance     15 min Neuromuscular Re-education:  [x]  See flow sheet :   Rationale: improve coordination, improve balance and increase proprioception  to improve the patients ability to ADLs, work tolerance    15 min Manual Therapy:  SOR, cervical traction, UT stretch, P/A mobs gd III/IV   Rationale: decrease pain, increase ROM and increase tissue extensibility to improve the patients ability to ADLs, work tolerance          With   [x] TE   [] TA   [] neuro   [] other: Patient Education: [x] Review HEP    [] Progressed/Changed HEP based on:   [] positioning   [] body mechanics   [] transfers   [] heat/ice application    [] other:      Other Objective/Functional Measures:   Pt able to tolerate 6.5 minutes on leg bike without an increase in symptoms  VORx1 x45 sVertical: mod dizziness present  VORx1 x45 s Horizontal: min dizziness  Position: [] Standing   [x] Sitting   [] Walking   Background: [x] Blank   [] Curtain   [] Pink Dots   []   Recovery Time: 3.5 minutes     EC balance - advanced to heel to instep    Pain Level (0-10 scale) post treatment: 4/10    ASSESSMENT/Changes in Function:     Patient will continue to benefit from skilled PT services to modify and progress therapeutic interventions, address functional mobility deficits, address ROM deficits, address strength deficits, analyze and address soft tissue restrictions, analyze and cue movement patterns, analyze and modify body mechanics/ergonomics, assess and modify postural abnormalities and address imbalance/dizziness to attain remaining goals.      []  See Plan of Care  []  See progress note/recertification  []  See Discharge Summary         Progress towards goals / Updated goals:   Pt limited by increased pain following VOR     PLAN  [x]  Upgrade activities as tolerated     [x]  Continue plan of care  [x]  Update interventions per flow sheet       []  Discharge due to:_  []  Other:_      Tay Min, PT 7/30/2019

## 2019-08-01 ENCOUNTER — HOSPITAL ENCOUNTER (OUTPATIENT)
Dept: PHYSICAL THERAPY | Age: 34
Discharge: HOME | End: 2019-08-01
Payer: COMMERCIAL

## 2019-08-01 PROCEDURE — 97112 NEUROMUSCULAR REEDUCATION: CPT | Performed by: PHYSICAL THERAPIST

## 2019-08-01 PROCEDURE — 97110 THERAPEUTIC EXERCISES: CPT | Performed by: PHYSICAL THERAPIST

## 2019-08-01 NOTE — PROGRESS NOTES
PT DAILY TREATMENT NOTE - [facility] 2-15    Patient Name: Claudell Grist  Date:2019  : 1985  [x]  Patient  Verified  Payor: Jerald Vidal / Plan: Reyes Pancoast / Product Type: HMO /    In time: 8:30 AM  Out time: 9:30 AM  Total Treatment Time (min): 60  Total Timed Codes (min): 45  1:1 Treatment Time ( only): 35  Visit #: 5      Treatment Area: Concussion [S06.0X9A]    SUBJECTIVE  Pain Level (0-10 scale): 4/10  Any medication changes, allergies to medications, adverse drug reactions, diagnosis change, or new procedure performed?: [x] No    [] Yes (see summary sheet for update)  Subjective functional status/changes:   [] No changes reported  Patient reports she has a headache and tried to do her exercises yesterday    OBJECTIVE    Modality rationale: decrease inflammation, decrease pain and increase tissue extensibility to improve the patients ability to ADLs, standing and walking tolerance   Min Type Additional Details    [] Estim: []Att   []Unatt        []TENS instruct                  []IFC  []Premod   []NMES                     []Other:  []w/US   []w/ice   []w/heat  Position:  Location:    []  Traction: [] Cervical       []Lumbar                       [] Prone          []Supine                       []Intermittent   []Continuous Lbs:  [] before manual  [] after manual  []w/heat    []  Ultrasound: []Continuous   [] Pulsed at:                           []1MHz   []3MHz Location:  W/cm2:    [] Paraffin         Location:   []w/heat   15 [x]  Ice     [x]  Heat  []  Ice massage Position: supine  Location: head and neck    []  Laser  []  Other: Position:  Location:      []  Vasopneumatic Device Pressure:       [] lo [] med [] hi   Temperature:      [x] Skin assessment post-treatment:  [x]intact []redness- no adverse reaction    []redness  adverse reaction:     20 min Therapeutic Exercise:  [x] See flow sheet :   Rationale: increase ROM and improve coordination to improve the patients ability to ADLs, work tolerance     20 min Neuromuscular Re-education:  [x]  See flow sheet :   Rationale: improve coordination, improve balance and increase proprioception  to improve the patients ability to ADLs, work tolerance    0 min Manual Therapy:  SOR, cervical traction, UT stretch, P/A mobs gd III/IV   Rationale: decrease pain, increase ROM and increase tissue extensibility to improve the patients ability to ADLs, work tolerance          With   [x] TE   [] TA   [] neuro   [] other: Patient Education: [x] Review HEP    [] Progressed/Changed HEP based on:   [] positioning   [] body mechanics   [] transfers   [] heat/ice application    [] other:      Other Objective/Functional Measures: VORx1 x60 sVertical: max dizziness present  VORx1 x45 s Horizontal: min dizziness  Position: [] Standing   [x] Sitting   [] Walking   Background: [x] Blank   [] Curtain   [] Pink Dots   []   Recovery Time: 4 min     1 LOB with balancing exercises    Pain Level (0-10 scale) post treatment: 4/10    ASSESSMENT/Changes in Function:     Patient will continue to benefit from skilled PT services to modify and progress therapeutic interventions, address functional mobility deficits, address ROM deficits, address strength deficits, analyze and address soft tissue restrictions, analyze and cue movement patterns, analyze and modify body mechanics/ergonomics, assess and modify postural abnormalities and address imbalance/dizziness to attain remaining goals. []  See Plan of Care  []  See progress note/recertification  []  See Discharge Summary         Progress towards goals / Updated goals:   Significant time spent discussing life stressors and need for stress management.     PLAN  [x]  Upgrade activities as tolerated     [x]  Continue plan of care  [x]  Update interventions per flow sheet       []  Discharge due to:_  []  Other:_      Kyler Dubois, PT 8/1/2019

## 2019-08-06 ENCOUNTER — HOSPITAL ENCOUNTER (OUTPATIENT)
Dept: PHYSICAL THERAPY | Age: 34
Discharge: HOME | End: 2019-08-06
Payer: COMMERCIAL

## 2019-08-06 PROCEDURE — 97112 NEUROMUSCULAR REEDUCATION: CPT | Performed by: PHYSICAL THERAPIST

## 2019-08-06 PROCEDURE — 97140 MANUAL THERAPY 1/> REGIONS: CPT | Performed by: PHYSICAL THERAPIST

## 2019-08-06 PROCEDURE — 97110 THERAPEUTIC EXERCISES: CPT | Performed by: PHYSICAL THERAPIST

## 2019-08-06 NOTE — PROGRESS NOTES
[employer] [facility]  [place]  [facility] [address]  Phone: [phone]      Fax:  [phone]    Progress Note    Name: Lawyer Aquino   : 1985   MD: Suleiman Larson MD       Treatment Diagnosis: Concussion [S06.0X9A]  Start of Care: 19    Visits from Start of Care: 6  Missed Visits: 1    Summary of Care: Therapeutic Exercise,  Manual Therapy, Neuromuscular Re-education,Patient Education, Home Exercise Program       Assessment / Recommendations: The patient has completed 6 visits and has made limited progress since start of care. The patient is unable to perform > 1 hour of work before experiencing an increase in symptoms. Her balance has improved slightly, but is poor if performed after a day of work. She is able to complete 7 minutes on the recumbent bike but is limited by headache. She may benefit from counseling or a medication for depression. She may benefit from working part time or taking a leave of absence from work. The stress and the demands of the job are making it difficult for her to advance in physical therapy. Short Term Goals: To be accomplished in 4 weeks:  1) The patient will be independent with introductory HEP - MET  2) The patient will improve the [place] Concussion Symptoms Scale to 46/64 or greater to indicate a significant improvement in function - Not assessed, patient did not attend  visit  3) The patient will report ability to complete a half day of work without an increase in symptoms - NOT MET  Long Term Goals:  To be accomplished in 12 weeks:  1) The patient will complete a full day of work without an increase in symptoms - NOT MET  2) The patient will report ability to complete household chores without an increase in symptoms - NOT MET  3) The patient will resume fitness routine without an increase in symptoms - NOT MET    Natividad Diego, PT 8/6/2019    ________________________________________________________________________  NOTE TO PHYSICIAN:  Please complete the following and fax to: [facility]: [phone]  . Retain this original for your records. If you are unable to process this request in 24 hours, please contact our office.        ____ I have read the above report and request that my patient continue therapy with the following changes/special instructions:  ____ I have read the above report and request that my patient be discharged from therapy    Physician's Signature:_________________ Date:___________Time:__________

## 2019-08-06 NOTE — PROGRESS NOTES
PT DAILY TREATMENT NOTE - [facility] 2-15    Patient Name: Claudio Hamm  Date:2019  : 1985  [x]  Patient  Verified  Payor: Yasir Richey / Plan: Rosa Frias / Product Type: HMO /    In time: 4:30 PM  Out time: 5:50 PM  Total Treatment Time (min): 80  Total Timed Codes (min): 60  1:1 Treatment Time ( only): 39  Visit #: 6      Treatment Area: Concussion [S06.0X9A]    SUBJECTIVE  Pain Level (0-10 scale): 8/10  Any medication changes, allergies to medications, adverse drug reactions, diagnosis change, or new procedure performed?: [x] No    [] Yes (see summary sheet for update)  Subjective functional status/changes:   [] No changes reported  Patient reports she started feeling bad one hour after getting to work    OBJECTIVE    Modality rationale: decrease inflammation, decrease pain and increase tissue extensibility to improve the patients ability to ADLs, standing and walking tolerance   Min Type Additional Details    [] Estim: []Att   []Unatt        []TENS instruct                  []IFC  []Premod   []NMES                     []Other:  []w/US   []w/ice   []w/heat  Position:  Location:    []  Traction: [] Cervical       []Lumbar                       [] Prone          []Supine                       []Intermittent   []Continuous Lbs:  [] before manual  [] after manual  []w/heat    []  Ultrasound: []Continuous   [] Pulsed at:                           []1MHz   []3MHz Location:  W/cm2:    [] Paraffin         Location:   []w/heat   20 [x]  Ice     [x]  Heat  []  Ice massage Position: supine  Location: head and neck    []  Laser  []  Other: Position:  Location:      []  Vasopneumatic Device Pressure:       [] lo [] med [] hi   Temperature:      [x] Skin assessment post-treatment:  [x]intact []redness- no adverse reaction    []redness  adverse reaction:     30 min Therapeutic Exercise:  [x] See flow sheet :   Rationale: increase ROM and improve coordination to improve the patients ability to ADLs, work tolerance     15 min Neuromuscular Re-education:  [x]  See flow sheet :   Rationale: improve coordination, improve balance and increase proprioception  to improve the patients ability to ADLs, work tolerance    15 min Manual Therapy:  SOR, cervical traction, UT stretch, P/A mobs gd III/IV   Rationale: decrease pain, increase ROM and increase tissue extensibility to improve the patients ability to ADLs, work tolerance          With   [x] TE   [] TA   [] neuro   [] other: Patient Education: [x] Review HEP    [] Progressed/Changed HEP based on:   [] positioning   [] body mechanics   [] transfers   [] heat/ice application    [] other:      Other Objective/Functional Measures:   Poor performance with balance exercises this visit      Excellent tolerance of supine stretching, added to HEP    Pain Level (0-10 scale) post treatment: 7    ASSESSMENT/Changes in Function:     Patient will continue to benefit from skilled PT services to modify and progress therapeutic interventions, address functional mobility deficits, address ROM deficits, address strength deficits, analyze and address soft tissue restrictions, analyze and cue movement patterns, analyze and modify body mechanics/ergonomics, assess and modify postural abnormalities and address imbalance/dizziness to attain remaining goals. []  See Plan of Care  []  See progress note/recertification  []  See Discharge Summary         Progress towards goals / Updated goals:   Give FOTO next visit.     PLAN  [x]  Upgrade activities as tolerated     [x]  Continue plan of care  [x]  Update interventions per flow sheet       []  Discharge due to:_  []  Other:_      Candice Vizcarra, PT 8/6/2019

## 2019-08-08 ENCOUNTER — HOSPITAL ENCOUNTER (OUTPATIENT)
Dept: PHYSICAL THERAPY | Age: 34
End: 2019-08-08
Payer: COMMERCIAL

## 2019-08-12 ENCOUNTER — HOSPITAL ENCOUNTER (OUTPATIENT)
Dept: PHYSICAL THERAPY | Age: 34
Discharge: HOME | End: 2019-08-12
Payer: COMMERCIAL

## 2019-08-12 ENCOUNTER — OFFICE VISIT (OUTPATIENT)
Dept: FAMILY MEDICINE CLINIC | Age: 34
End: 2019-08-12

## 2019-08-12 VITALS
OXYGEN SATURATION: 100 % | RESPIRATION RATE: 18 BRPM | SYSTOLIC BLOOD PRESSURE: 104 MMHG | TEMPERATURE: 98.8 F | HEART RATE: 74 BPM | BODY MASS INDEX: 23.32 KG/M2 | DIASTOLIC BLOOD PRESSURE: 65 MMHG | HEIGHT: 65 IN | WEIGHT: 140 LBS

## 2019-08-12 DIAGNOSIS — F07.81 POST-CONCUSSIONAL SYNDROME: Primary | ICD-10-CM

## 2019-08-12 DIAGNOSIS — R20.2 NUMBNESS AND TINGLING OF RIGHT ARM AND LEG: ICD-10-CM

## 2019-08-12 DIAGNOSIS — R20.0 NUMBNESS AND TINGLING OF RIGHT ARM AND LEG: ICD-10-CM

## 2019-08-12 DIAGNOSIS — R53.83 FATIGUE, UNSPECIFIED TYPE: ICD-10-CM

## 2019-08-12 PROCEDURE — 97112 NEUROMUSCULAR REEDUCATION: CPT | Performed by: PHYSICAL MEDICINE & REHABILITATION

## 2019-08-12 PROCEDURE — 97110 THERAPEUTIC EXERCISES: CPT | Performed by: PHYSICAL MEDICINE & REHABILITATION

## 2019-08-12 NOTE — PROGRESS NOTES
History of Present Illness     Patient Identification  Azam Fischer is a 26 y.o. female here for follow-up on concussion. Continues to have HA everyday. Left and right sided. Nothing makes its worse, no triggers. Better with rest.  States HA are getting worse, not more intense and lasting longer. HA worse with exertion and sometimes wakes up with HA. Complains of fatigue that has been worsening over the past few months. States she has not started Amantidine because this was too expensive. Tried Amitrypine and made her too tired so she stopped. She states she is been having right upper extremity weakness with tingling and numbness in the right upper and lower extremities. States she has had this on and off since her head injury but now more prominent. States she is having a tough time at work. They are not allowing her to have breaks. States that letter that I wrote for what is required for her workplace. She states will bring    Date of Onset: 1/30/19  Mechanism of Injury:  Hit back of her head on the wall from a whiplash motion on 1/30 at work    . History reviewed. No pertinent past medical history. Family History   Problem Relation Age of Onset    Cancer Mother         breast    Cancer Father         prostate     Current Outpatient Medications   Medication Sig Dispense Refill    acetaminophen/caffeine (TENSION HEADACHE PO) Take  by mouth. No Known Allergies    Review of Systems  A comprehensive review of systems was negative except for that written in the HPI.      Physical Exam     Visit Vitals  /65 (BP 1 Location: Right arm, BP Patient Position: Sitting)   Pulse 74   Temp 98.8 °F (37.1 °C) (Oral)   Resp 18   Ht 5' 5\" (1.651 m)   Wt 140 lb (63.5 kg)   LMP 07/30/2019   SpO2 100%   BMI 23.30 kg/m²     General: Well-developed, well-nourished, in no acute distress   Neck: Supple, non-tender, no lymphadenopathy, no carotid bruits   Lungs: Normal symmetric bilateral chest movement. Clear to auscultation bilaterally   CV: Regular rate and rhythm. No murmur or gallops auscultated    Abdomen: Soft, non-tender, bowel sounds positive, no rebound or guarding    Neurological Exam   Mental status: Alert and oriented to person, place and situation  Language: normal fluency and comprehension; no dysarthria  CN II-XII: CAROL; EOMI; no nystagmus   Fundoscopic exam benign. Visual fields full to confrontation   Facial sensation intact in V1, V2, V3 distribution bilaterally  Face appears symmetric and facial strength normal   Hearing is intact to casual conversation. Palate elevates symmetrically  Normal shoulder shrug bilaterally  Tongue protrudes midline, no atrophy, no fasciculations   Sensory: intact light touch and position  Motor: Normal bulk, tone, and strength in all 4 extremities. No cog-wheeling, but spasticity noted on RUE   Reflexes: DTRs are symmetric, 2+, no sustained clonus   Coordination: No disdiadochokinesia with normal rapid alternating movements. Normal heel to shin bilaterally and finger to nose. Gait: normal stance and stride  Romberg: negative             Assessment:    ICD-10-CM ICD-9-CM    1. Post-concussional syndrome F07.81 310.2    2. Fatigue, unspecified type R53.83 780.79 CBC WITH AUTOMATED DIFF      METABOLIC PANEL, COMPREHENSIVE      TSH REFLEX TO T4   3. Numbness and tingling of right arm and leg R20.0 782.0 MRI BRAIN WO CONT    R20.2  VITAMIN B12       Plan: We will get an MRI for right sided paresthesias. We will also get labs to assess for her fatigue and spasticity. Patient with flat affect today. If work-up above negative, will consider cognitive behavioral therapy. Concerned she may be having adjustment disorder from her concussion versus depression. Denies any thoughts of hurting herself or others at this time. She will bring her work accommodations form for me to fill out.     Follow-up and Dispositions    · Return for MRI follow up and Concussion .

## 2019-08-12 NOTE — PROGRESS NOTES
Chief Complaint   Patient presents with    Concussion     one month follow up      1. Have you been to the ER, urgent care clinic since your last visit? Hospitalized since your last visit? No    2. Have you seen or consulted any other health care providers outside of the [address] [place] [address] since your last visit? Include any pap smears or colon screening. No     Reviewed record in preparation for visit and have obtained necessary documentation.

## 2019-08-12 NOTE — PROGRESS NOTES
PT DAILY TREATMENT NOTE - [facility] 2-15    Patient Name: Maddi Res  Date:2019  : 1985  [x]  Patient  Verified  Payor: Linda Brown / Plan: Jessica Monae / Product Type: HMO /    In time: 530 PM  Out time: 640 PM  Total Treatment Time (min): 70  Total Timed Codes (min): 55  1:1 Treatment Time ( only): 40  Visit #: 7      Treatment Area: Concussion [G47.6K6R]    SUBJECTIVE  Pain Level (0-10 scale): 6/10  Any medication changes, allergies to medications, adverse drug reactions, diagnosis change, or new procedure performed?: [x] No    [] Yes (see summary sheet for update)  Subjective functional status/changes:   [] No changes reported  Patient reports she has a most a 4/10 HA over the weekend and usually >6/10 HA on work days.     OBJECTIVE    Modality rationale: decrease inflammation, decrease pain and increase tissue extensibility to improve the patients ability to ADLs, standing and walking tolerance   Min Type Additional Details    [] Estim: []Att   []Unatt        []TENS instruct                  []IFC  []Premod   []NMES                     []Other:  []w/US   []w/ice   []w/heat  Position:  Location:    []  Traction: [] Cervical       []Lumbar                       [] Prone          []Supine                       []Intermittent   []Continuous Lbs:  [] before manual  [] after manual  []w/heat    []  Ultrasound: []Continuous   [] Pulsed at:                           []1MHz   []3MHz Location:  W/cm2:    [] Paraffin         Location:   []w/heat   15 [x]  Ice     [x]  Heat  []  Ice massage Position: supine  Location: head and neck    []  Laser  []  Other: Position:  Location:      []  Vasopneumatic Device Pressure:       [] lo [] med [] hi   Temperature:      [x] Skin assessment post-treatment:  [x]intact []redness- no adverse reaction    []redness  adverse reaction:     30 min Therapeutic Exercise:  [x] See flow sheet :   Rationale: increase ROM and improve coordination to improve the patients ability to ADLs, work tolerance     25 min Neuromuscular Re-education:  [x]  See flow sheet :   Rationale: improve coordination, improve balance and increase proprioception  to improve the patients ability to ADLs, work tolerance          With   [x] TE   [] TA   [] neuro   [] other: Patient Education: [x] Review HEP    [] Progressed/Changed HEP based on:   [] positioning   [] body mechanics   [] transfers   [] heat/ice application    [] other:      Other Objective/Functional Measures: VORx1 x60 sVertical: mod dizziness present  VORx1 x60 s Horizontal: mod dizziness  Position: [] Standing   [x] Sitting   [] Walking   Background: [x] Blank   [] Curtain   [] Pink Dots   []   Recovery Time: 1.5 vertical 3.5 horizontal    Pain Level (0-10 scale) post treatment: 7    ASSESSMENT/Changes in Function:     Patient will continue to benefit from skilled PT services to modify and progress therapeutic interventions, address functional mobility deficits, address ROM deficits, address strength deficits, analyze and address soft tissue restrictions, analyze and cue movement patterns, analyze and modify body mechanics/ergonomics, assess and modify postural abnormalities and address imbalance/dizziness to attain remaining goals. []  See Plan of Care  []  See progress note/recertification  []  See Discharge Summary         Progress towards goals / Updated goals:   Patient is showing slow progress towards goals due to severity of symptoms with work duties after 1 hour.     PLAN  [x]  Upgrade activities as tolerated     [x]  Continue plan of care  [x]  Update interventions per flow sheet       []  Discharge due to:_  []  Other:_      Gustavus Mcardle, PTA, CPT 8/12/2019

## 2019-08-13 LAB
ALBUMIN SERPL-MCNC: 4.5 G/DL (ref 3.5–5.5)
ALBUMIN/GLOB SERPL: 1.5 {RATIO} (ref 1.2–2.2)
ALP SERPL-CCNC: 55 IU/L (ref 39–117)
ALT SERPL-CCNC: 11 IU/L (ref 0–32)
AST SERPL-CCNC: 12 IU/L (ref 0–40)
BASOPHILS # BLD AUTO: 0 X10E3/UL (ref 0–0.2)
BASOPHILS NFR BLD AUTO: 0 %
BILIRUB SERPL-MCNC: 0.2 MG/DL (ref 0–1.2)
BUN SERPL-MCNC: 5 MG/DL (ref 6–20)
BUN/CREAT SERPL: 6 (ref 9–23)
CALCIUM SERPL-MCNC: 9.2 MG/DL (ref 8.7–10.2)
CHLORIDE SERPL-SCNC: 105 MMOL/L (ref 96–106)
CO2 SERPL-SCNC: 21 MMOL/L (ref 20–29)
CREAT SERPL-MCNC: 0.77 MG/DL (ref 0.57–1)
EOSINOPHIL # BLD AUTO: 0.1 X10E3/UL (ref 0–0.4)
EOSINOPHIL NFR BLD AUTO: 1 %
ERYTHROCYTE [DISTWIDTH] IN BLOOD BY AUTOMATED COUNT: 14 % (ref 12.3–15.4)
GLOBULIN SER CALC-MCNC: 3.1 G/DL (ref 1.5–4.5)
GLUCOSE SERPL-MCNC: 89 MG/DL (ref 65–99)
HCT VFR BLD AUTO: 38.1 % (ref 34–46.6)
HGB BLD-MCNC: 12.4 G/DL (ref 11.1–15.9)
IMM GRANULOCYTES # BLD AUTO: 0 X10E3/UL (ref 0–0.1)
IMM GRANULOCYTES NFR BLD AUTO: 0 %
LYMPHOCYTES # BLD AUTO: 1.9 X10E3/UL (ref 0.7–3.1)
LYMPHOCYTES NFR BLD AUTO: 34 %
MCH RBC QN AUTO: 26.8 PG (ref 26.6–33)
MCHC RBC AUTO-ENTMCNC: 32.5 G/DL (ref 31.5–35.7)
MCV RBC AUTO: 82 FL (ref 79–97)
MONOCYTES # BLD AUTO: 0.5 X10E3/UL (ref 0.1–0.9)
MONOCYTES NFR BLD AUTO: 9 %
NEUTROPHILS # BLD AUTO: 3 X10E3/UL (ref 1.4–7)
NEUTROPHILS NFR BLD AUTO: 56 %
PLATELET # BLD AUTO: 250 X10E3/UL (ref 150–450)
POTASSIUM SERPL-SCNC: 4.1 MMOL/L (ref 3.5–5.2)
PROT SERPL-MCNC: 7.6 G/DL (ref 6–8.5)
RBC # BLD AUTO: 4.63 X10E6/UL (ref 3.77–5.28)
SODIUM SERPL-SCNC: 141 MMOL/L (ref 134–144)
TSH SERPL DL<=0.005 MIU/L-ACNC: 0.93 UIU/ML (ref 0.45–4.5)
VIT B12 SERPL-MCNC: 656 PG/ML (ref 232–1245)
WBC # BLD AUTO: 5.5 X10E3/UL (ref 3.4–10.8)

## 2019-08-20 ENCOUNTER — OFFICE VISIT (OUTPATIENT)
Dept: FAMILY MEDICINE CLINIC | Age: 34
End: 2019-08-20

## 2019-08-20 ENCOUNTER — TELEPHONE (OUTPATIENT)
Dept: TELEHEALTH | Age: 34
End: 2019-08-20

## 2019-08-20 VITALS
RESPIRATION RATE: 16 BRPM | BODY MASS INDEX: 23.32 KG/M2 | HEART RATE: 91 BPM | SYSTOLIC BLOOD PRESSURE: 104 MMHG | HEIGHT: 65 IN | TEMPERATURE: 98.6 F | OXYGEN SATURATION: 100 % | DIASTOLIC BLOOD PRESSURE: 66 MMHG | WEIGHT: 140 LBS

## 2019-08-20 DIAGNOSIS — F07.81 POST-CONCUSSIONAL SYNDROME: Primary | ICD-10-CM

## 2019-08-20 NOTE — LETTER
8/20/2019 11:15 AM 
 
Ms. Jorge Oates [address] [facility] [address] To Whom It May Concern: 
 
Jorge Hitchcock is currently under the care of [facility] [employer]. The Sports Medicine attending physician she regularly sees is not in the office today and he will be back next week. Return to work form will be completed once pending imaging study has resulted. If there are questions or concerns please have the patient contact our office.  
 
 
 
Sincerely, 
 
 
Alf Mondragon MD

## 2019-08-22 ENCOUNTER — HOSPITAL ENCOUNTER (OUTPATIENT)
Dept: MRI IMAGING | Age: 34
Discharge: HOME | End: 2019-08-22
Attending: FAMILY MEDICINE
Payer: COMMERCIAL

## 2019-08-22 DIAGNOSIS — R20.2 NUMBNESS AND TINGLING OF RIGHT ARM AND LEG: ICD-10-CM

## 2019-08-22 DIAGNOSIS — R20.0 NUMBNESS AND TINGLING OF RIGHT ARM AND LEG: ICD-10-CM

## 2019-08-22 PROCEDURE — 70551 MRI BRAIN STEM W/O DYE: CPT

## 2019-08-26 ENCOUNTER — TELEPHONE (OUTPATIENT)
Dept: FAMILY MEDICINE CLINIC | Age: 34
End: 2019-08-26

## 2019-08-26 NOTE — TELEPHONE ENCOUNTER
----- Message from Charo Goldman sent at 8/26/2019  4:15 PM EDT -----  Regarding: Dr Saemer Cotton: [phone]  Pt. wants to see if return to work form is ready for .  Best contact: [phone]

## 2019-08-28 NOTE — TELEPHONE ENCOUNTER
Dr Castro Greek   Received: Sherren Shan, [address] [address] Message/Vendor Calls     Caller's first and last name:       Reason for call: pt is following up on request for medical forms to be filled out for her job so she can return to work. Pt has requested several times.        Callback required yes/no and why:       Best contact number(s):[phone]       Details to clarify the request:       Bev Huggins

## 2019-08-29 NOTE — TELEPHONE ENCOUNTER
Abdulaziz message of 5:22 pm of yesterday. /Telephone   Received: Yesterday      Call patient   Message Contents   [place], [address]             General Message/Vendor Calls     Caller's first and last name:       Reason for call:Pt requesting a call back regarding discussing completing medical form submitted last week. Pt stated this is her 4th attempt for a call back.        Callback required yes/no and why:Yes       Best contact number(s):[phone]       Details to clarify the request:       Wally Strauss

## 2019-09-16 NOTE — ANCILLARY DISCHARGE INSTRUCTIONS
[address] [facility] Dianna Franco 55  Phone: [phone]  Fax: [phone]    Discharge Summary  2-15    Patient name: Mei Norris  : 1985  Provider#: [id number]  Referral source: Nikolai Holt MD      Medical/Treatment Diagnosis: Concussion [S06.0X9A]     Prior Hospitalization: see medical history     Comorbidities: See Plan of Care  Prior Level of Function:See Plan of Care  Medications: Verified on Patient Summary List    Start of Care: 19      Onset Date:19   Visits from Start of Care: 7     Missed Visits: 0  Reporting Period : 19 to 19      ASSESSMENT/SUMMARY OF CARE: The patient has not made significant progress towards short or long term goals despite excellent attendance with PT visits. See last progress note from 19 for additional information.           RECOMMENDATIONS:  [x]Discontinue therapy: []Patient has reached or is progressing toward set goals      []Patient is non-compliant or has abdicated      [x]Due to lack of appreciable progress towards set goals      []Other    Wil Wall, PT 2019

## 2019-09-18 ENCOUNTER — E-ADVICE (OUTPATIENT)
Dept: FAMILY MEDICINE | Age: 34
End: 2019-09-18

## 2019-11-02 ENCOUNTER — WALK IN (OUTPATIENT)
Dept: URGENT CARE | Age: 34
End: 2019-11-02

## 2019-11-02 VITALS
TEMPERATURE: 97.8 F | OXYGEN SATURATION: 99 % | RESPIRATION RATE: 18 BRPM | SYSTOLIC BLOOD PRESSURE: 102 MMHG | DIASTOLIC BLOOD PRESSURE: 70 MMHG | HEART RATE: 72 BPM | BODY MASS INDEX: 26.29 KG/M2 | WEIGHT: 154 LBS | HEIGHT: 64 IN

## 2019-11-02 DIAGNOSIS — N39.0 URINARY TRACT INFECTION WITHOUT HEMATURIA, SITE UNSPECIFIED: Primary | ICD-10-CM

## 2019-11-02 LAB
APPEARANCE, POC: CLEAR
B-HCG UR QL: NEGATIVE
BILIRUBIN, POC: NEGATIVE
COLOR, POC: YELLOW
GLUCOSE UR-MCNC: NEGATIVE MG/DL
KETONES, POC: NEGATIVE
NITRITE, POC: POSITIVE
OCCULT BLOOD, POC: ABNORMAL
PH UR: 5.5 [PH] (ref 5–7)
PROT UR-MCNC: NEGATIVE G/DL
SP GR UR: 1.03 (ref 1–1.03)
UROBILINOGEN UR-MCNC: 0.2 MG/DL (ref 0–1)
WBC (LEUKOCYTE) ESTERASE, POC: ABNORMAL

## 2019-11-02 PROCEDURE — 81025 URINE PREGNANCY TEST: CPT | Performed by: NURSE PRACTITIONER

## 2019-11-02 PROCEDURE — 99214 OFFICE O/P EST MOD 30 MIN: CPT | Performed by: NURSE PRACTITIONER

## 2019-11-02 PROCEDURE — 87077 CULTURE AEROBIC IDENTIFY: CPT | Performed by: INTERNAL MEDICINE

## 2019-11-02 PROCEDURE — 87186 SC STD MICRODIL/AGAR DIL: CPT | Performed by: INTERNAL MEDICINE

## 2019-11-02 PROCEDURE — 81002 URINALYSIS NONAUTO W/O SCOPE: CPT | Performed by: NURSE PRACTITIONER

## 2019-11-02 PROCEDURE — 87086 URINE CULTURE/COLONY COUNT: CPT | Performed by: INTERNAL MEDICINE

## 2019-11-02 RX ORDER — SULFAMETHOXAZOLE AND TRIMETHOPRIM 800; 160 MG/1; MG/1
1 TABLET ORAL 2 TIMES DAILY
Qty: 14 TABLET | Refills: 0 | Status: SHIPPED | OUTPATIENT
Start: 2019-11-02 | End: 2019-11-09

## 2019-11-02 ASSESSMENT — ENCOUNTER SYMPTOMS
RESPIRATORY NEGATIVE: 1
CONSTITUTIONAL NEGATIVE: 1
NAUSEA: 1

## 2019-11-05 ENCOUNTER — TELEPHONE (OUTPATIENT)
Dept: URGENT CARE | Age: 34
End: 2019-11-05

## 2019-11-07 ENCOUNTER — OFFICE VISIT (OUTPATIENT)
Dept: FAMILY MEDICINE CLINIC | Age: 34
End: 2019-11-07

## 2019-11-07 VITALS
HEIGHT: 65 IN | OXYGEN SATURATION: 100 % | DIASTOLIC BLOOD PRESSURE: 75 MMHG | RESPIRATION RATE: 16 BRPM | BODY MASS INDEX: 24.19 KG/M2 | SYSTOLIC BLOOD PRESSURE: 109 MMHG | HEART RATE: 79 BPM | TEMPERATURE: 98.1 F | WEIGHT: 145.2 LBS

## 2019-11-07 DIAGNOSIS — N75.0 BARTHOLIN'S CYST: Primary | ICD-10-CM

## 2019-11-07 DIAGNOSIS — Z28.21 REFUSED INFLUENZA VACCINE: ICD-10-CM

## 2019-11-07 NOTE — PROGRESS NOTES
HPI       Chief Complaint   Patient presents with    Other     patient states having swelling in vaginal wall for 5 days        Evans Barboza is a 37 y.o. female who presents for vaginal swelling  It has been present for 5 days  Says its swollen inside, tender to palpation initially and now uncomfortable all the time   1st episode  No vaginal discharge. Feels dry  Sexually active, does not use protection   Denies fever, chills, nausea, vomiting    Declines flu shot        PMHx-reviewed:  History reviewed. No pertinent past medical history. Meds-reviewed:   Current Outpatient Medications   Medication Sig Dispense Refill    acetaminophen/caffeine (TENSION HEADACHE PO) Take  by mouth. Allergies-reviewed:   No Known Allergies    Smoker-reviewed:  Social History     Tobacco Use   Smoking Status Never Smoker   Smokeless Tobacco Never Used     ETOH-reviewed:   Social History     Substance and Sexual Activity   Alcohol Use No     FH-reviewed:   Family History   Problem Relation Age of Onset    Cancer Mother         breast    Cancer Father         prostate     ROS:  Review of Systems   Constitutional: Negative. Respiratory: Negative. Cardiovascular: Negative. Gastrointestinal: Negative. Genitourinary: Positive for vaginal pain. Vaginal swelling   Skin: Negative. Physical Exam:  Visit Vitals  /75   Pulse 79   Temp 98.1 °F (36.7 °C) (Oral)   Resp 16   Ht 5' 5\" (1.651 m)   Wt 145 lb 3.2 oz (65.9 kg)   LMP 10/16/2019   SpO2 100%   BMI 24.16 kg/m²       Wt Readings from Last 3 Encounters:   11/07/19 145 lb 3.2 oz (65.9 kg)   08/20/19 140 lb (63.5 kg)   08/12/19 140 lb (63.5 kg)     BP Readings from Last 3 Encounters:   11/07/19 109/75   08/20/19 104/66   08/12/19 104/65      Physical Exam   Constitutional: She appears well-developed and well-nourished. No distress. Cardiovascular: Normal rate and regular rhythm. No murmur heard.   Pulmonary/Chest: Effort normal and breath sounds normal. She has no wheezes. Abdominal: Soft. Bowel sounds are normal. There is no tenderness. Genitourinary: There is tenderness in the vagina. Genitourinary Comments: 1 cm round, hard, fluid filled cyst. Tender to palpation    Skin: Skin is warm and dry. No rash noted. Assessment     37 y.o. female with:    ICD-10-CM ICD-9-CM    1. Bartholin's cyst N75.0 616.2    2. Refused influenza vaccine Z28.21 V64.06               Plan     No orders of the defined types were placed in this encounter. Bartholin's cyst. Conservative management for now: warm compresses, sitz baths. Return in 1 week. If still present, consider I&D. If it gets worse before then, return sooner   Patient refused Influenza vaccine     Patient discussed and seen with Dr. Stephan Espinoza     I have discussed the diagnosis with the patient and the intended plan as seen in the above orders. The patient has received an after-visit summary and questions were answered concerning future plans. I have discussed medication side effects and warnings with the patient as well.     Abbey Lino MD  Family Medicine Resident  PGY-3

## 2019-11-07 NOTE — PATIENT INSTRUCTIONS
Bartholin Gland Cyst: Care Instructions  Your Care Instructions    The Bartholin glands are in a woman's vulva. This is the area around the vagina. The glands are normally about the size of a pea. They provide fluid to the vulvar area through a small opening. If the opening is blocked, the gland swells with fluid and forms a cyst. You can have a cyst for years with no symptoms. But if a cyst gets infected by bacteria, it can grow and become red and painful. This is called an abscess. Opening and draining the cyst usually cures the infection. You may have had a small tube (catheter) placed into the cyst or had minor surgery to let the cyst drain. The tube will usually be left in for at least 4 weeks. Your doctor may do a lab test to find out what kind of bacteria caused the infection. You may get antibiotics to kill the bacteria. You may have some drainage from the cyst for a few weeks. The gland should return to normal after the infection clears up. Follow-up care is a key part of your treatment and safety. Be sure to make and go to all appointments, and call your doctor if you are having problems. It's also a good idea to know your test results and keep a list of the medicines you take. How can you care for yourself at home? · If your doctor prescribed antibiotics, take them as directed. Do not stop taking them just because you feel better. You need to take the full course of antibiotics. · Sit in a few inches of warm water (sitz bath) 3 times a day and after bowel movements. The warm water helps the area heal and eases discomfort. · Take an over-the-counter pain medicine such as acetaminophen (Tylenol), ibuprofen (Advil, Motrin), or naproxen (Aleve). Read and follow all instructions on the label. · Do not take two or more pain medicines at the same time unless the doctor told you to. Many pain medicines have acetaminophen, which is Tylenol. Too much acetaminophen (Tylenol) can be harmful.   · Wear panty liners or pads if you have discharge from the draining cyst.  · If you are sexually active, avoid sex until:  ? You have finished the antibiotics. ? The area has healed. · If you had a catheter placed in the cyst to help it drain, follow your doctor's instructions for activities until the tube comes out. When should you call for help? Call your doctor now or seek immediate medical care if:    · You have symptoms of a new or worse infection, such as:  ? Increased pain, swelling, warmth, or redness. ? Red streaks leading from the area. ? Pus draining from the area. ? A fever.    Watch closely for changes in your health, and be sure to contact your doctor if:    · The catheter falls out.     · You are not getting better as expected. Where can you learn more? Go to http://roland-agueda.info/. Enter H276 in the search box to learn more about \"Bartholin Gland Cyst: Care Instructions. \"  Current as of: February 19, 2019  Content Version: 12.2  © 5617-4320 [employer]. Care instructions adapted under license by [employer] (which disclaims liability or warranty for this information). If you have questions about a medical condition or this instruction, always ask your healthcare professional. [address] any warranty or liability for your use of this information.

## 2019-11-07 NOTE — PROGRESS NOTES
Chief Complaint   Patient presents with    Other     patient states having swelling in vaginal wall for 5 days      Blood pressure 109/75, pulse 79, temperature 98.1 °F (36.7 °C), temperature source Oral, resp. rate 16, height 5' 5\" (1.651 m), weight 145 lb 3.2 oz (65.9 kg), last menstrual period 10/16/2019, SpO2 100 %. 1. Have you been to the ER, urgent care clinic since your last visit? Hospitalized since your last visit? No    2. Have you seen or consulted any other health care providers outside of the [address] [place] [address] since your last visit? Include any pap smears or colon screening.  No

## 2019-11-08 NOTE — PROGRESS NOTES
37year old female with left sided Bartholin's cust    Conservative therapy for now    I saw and evaluated the patient, performing the key elements of the service. I discussed the findings, assessment and plan with the resident and agree with the resident's findings and plan as documented in the resident's note.

## 2019-12-23 ENCOUNTER — WALK IN (OUTPATIENT)
Dept: URGENT CARE | Age: 34
End: 2019-12-23

## 2019-12-23 VITALS
SYSTOLIC BLOOD PRESSURE: 120 MMHG | DIASTOLIC BLOOD PRESSURE: 59 MMHG | RESPIRATION RATE: 17 BRPM | TEMPERATURE: 98.2 F | OXYGEN SATURATION: 99 % | HEART RATE: 81 BPM

## 2019-12-23 DIAGNOSIS — J11.1 INFLUENZA-LIKE ILLNESS: ICD-10-CM

## 2019-12-23 DIAGNOSIS — J02.0 STREP PHARYNGITIS: Primary | ICD-10-CM

## 2019-12-23 DIAGNOSIS — Z20.828 EXPOSURE TO INFLUENZA: ICD-10-CM

## 2019-12-23 LAB
FLUAV AG UPPER RESP QL IA.RAPID: NEGATIVE
FLUBV AG UPPER RESP QL IA.RAPID: NEGATIVE
INTERNAL PROCEDURAL CONTROLS ACCEPTABLE: YES
S PYO AG THROAT QL IA.RAPID: POSITIVE

## 2019-12-23 PROCEDURE — 87804 INFLUENZA ASSAY W/OPTIC: CPT | Performed by: FAMILY MEDICINE

## 2019-12-23 PROCEDURE — 87880 STREP A ASSAY W/OPTIC: CPT | Performed by: FAMILY MEDICINE

## 2019-12-23 PROCEDURE — 99213 OFFICE O/P EST LOW 20 MIN: CPT | Performed by: FAMILY MEDICINE

## 2019-12-23 RX ORDER — AMOXICILLIN 875 MG/1
875 TABLET, COATED ORAL 2 TIMES DAILY
Qty: 20 TABLET | Refills: 0 | Status: SHIPPED | OUTPATIENT
Start: 2019-12-23 | End: 2020-01-02

## 2019-12-31 ENCOUNTER — TELEPHONE (OUTPATIENT)
Dept: URGENT CARE | Age: 34
End: 2019-12-31

## 2019-12-31 ENCOUNTER — E-ADVICE (OUTPATIENT)
Dept: FAMILY MEDICINE | Age: 34
End: 2019-12-31

## 2020-01-03 ENCOUNTER — OFFICE VISIT (OUTPATIENT)
Dept: FAMILY MEDICINE | Age: 35
End: 2020-01-03

## 2020-01-03 ENCOUNTER — TELEPHONE (OUTPATIENT)
Dept: TELEHEALTH | Age: 35
End: 2020-01-03

## 2020-01-03 VITALS
HEIGHT: 64 IN | BODY MASS INDEX: 24.89 KG/M2 | SYSTOLIC BLOOD PRESSURE: 98 MMHG | TEMPERATURE: 99.4 F | RESPIRATION RATE: 16 BRPM | WEIGHT: 145.8 LBS | OXYGEN SATURATION: 99 % | DIASTOLIC BLOOD PRESSURE: 68 MMHG | HEART RATE: 58 BPM

## 2020-01-03 DIAGNOSIS — J02.9 SORE THROAT: ICD-10-CM

## 2020-01-03 DIAGNOSIS — J11.1 INFLUENZA: Primary | ICD-10-CM

## 2020-01-03 DIAGNOSIS — R50.9 FEVER, UNSPECIFIED FEVER CAUSE: ICD-10-CM

## 2020-01-03 DIAGNOSIS — R52 BODY ACHES: ICD-10-CM

## 2020-01-03 LAB
FLUAV RNA SPEC QL NAA+PROBE: NOT DETECTED
FLUBV RNA SPEC QL NAA+PROBE: DETECTED
INTERNAL PROCEDURAL CONTROLS ACCEPTABLE: YES
S PYO AG THROAT QL IA.RAPID: NEGATIVE
SPECIMEN SOURCE: ABNORMAL

## 2020-01-03 PROCEDURE — 87880 STREP A ASSAY W/OPTIC: CPT | Performed by: FAMILY MEDICINE

## 2020-01-03 PROCEDURE — 87502 INFLUENZA DNA AMP PROBE: CPT | Performed by: INTERNAL MEDICINE

## 2020-01-03 PROCEDURE — 99214 OFFICE O/P EST MOD 30 MIN: CPT | Performed by: FAMILY MEDICINE

## 2020-01-03 RX ORDER — OSELTAMIVIR PHOSPHATE 75 MG/1
75 CAPSULE ORAL 2 TIMES DAILY
Qty: 10 CAPSULE | Refills: 0 | Status: SHIPPED | OUTPATIENT
Start: 2020-01-03 | End: 2020-01-08

## 2020-01-06 ENCOUNTER — TELEPHONE (OUTPATIENT)
Dept: FAMILY MEDICINE | Age: 35
End: 2020-01-06

## 2020-01-06 ENCOUNTER — E-ADVICE (OUTPATIENT)
Dept: FAMILY MEDICINE | Age: 35
End: 2020-01-06

## 2020-01-26 ENCOUNTER — E-ADVICE (OUTPATIENT)
Dept: FAMILY MEDICINE | Age: 35
End: 2020-01-26

## 2020-02-07 ENCOUNTER — TELEPHONE (OUTPATIENT)
Dept: FAMILY MEDICINE | Age: 35
End: 2020-02-07

## 2020-02-07 ENCOUNTER — OFFICE VISIT (OUTPATIENT)
Dept: FAMILY MEDICINE | Age: 35
End: 2020-02-07

## 2020-02-07 VITALS
WEIGHT: 147.71 LBS | SYSTOLIC BLOOD PRESSURE: 102 MMHG | DIASTOLIC BLOOD PRESSURE: 74 MMHG | BODY MASS INDEX: 25.22 KG/M2 | HEIGHT: 64 IN | TEMPERATURE: 96.9 F | HEART RATE: 68 BPM | RESPIRATION RATE: 12 BRPM

## 2020-02-07 DIAGNOSIS — J06.9 VIRAL URI WITH COUGH: ICD-10-CM

## 2020-02-07 DIAGNOSIS — J35.1 ENLARGED TONSILS: ICD-10-CM

## 2020-02-07 DIAGNOSIS — J02.0 STREP PHARYNGITIS: Primary | ICD-10-CM

## 2020-02-07 LAB
INTERNAL PROCEDURAL CONTROLS ACCEPTABLE: YES
S PYO AG THROAT QL IA.RAPID: POSITIVE

## 2020-02-07 PROCEDURE — 99214 OFFICE O/P EST MOD 30 MIN: CPT | Performed by: STUDENT IN AN ORGANIZED HEALTH CARE EDUCATION/TRAINING PROGRAM

## 2020-02-07 PROCEDURE — 87880 STREP A ASSAY W/OPTIC: CPT | Performed by: STUDENT IN AN ORGANIZED HEALTH CARE EDUCATION/TRAINING PROGRAM

## 2020-02-07 RX ORDER — AMOXICILLIN AND CLAVULANATE POTASSIUM 875; 125 MG/1; MG/1
1 TABLET, FILM COATED ORAL EVERY 12 HOURS
Qty: 20 TABLET | Refills: 0 | Status: SHIPPED | OUTPATIENT
Start: 2020-02-07 | End: 2020-05-18 | Stop reason: ALTCHOICE

## 2020-02-07 ASSESSMENT — ENCOUNTER SYMPTOMS
SPUTUM PRODUCTION: 1
COUGH: 1
WHEEZING: 0
DIARRHEA: 0
SHORTNESS OF BREATH: 0
FEVER: 1
CONSTIPATION: 0
CHILLS: 1

## 2020-02-07 ASSESSMENT — PATIENT HEALTH QUESTIONNAIRE - PHQ9
2. FEELING DOWN, DEPRESSED OR HOPELESS: NOT AT ALL
SUM OF ALL RESPONSES TO PHQ9 QUESTIONS 1 AND 2: 0
1. LITTLE INTEREST OR PLEASURE IN DOING THINGS: NOT AT ALL
SUM OF ALL RESPONSES TO PHQ9 QUESTIONS 1 AND 2: 0

## 2020-02-18 ENCOUNTER — TELEPHONE (OUTPATIENT)
Dept: SCHEDULING | Age: 35
End: 2020-02-18

## 2020-04-03 ENCOUNTER — E-ADVICE (OUTPATIENT)
Dept: FAMILY MEDICINE | Age: 35
End: 2020-04-03

## 2020-04-03 DIAGNOSIS — Z20.822 SUSPECTED COVID-19 VIRUS INFECTION: ICD-10-CM

## 2020-04-03 DIAGNOSIS — Z71.89 ADVICE GIVEN ABOUT COVID-19 VIRUS BY TELEPHONE: ICD-10-CM

## 2020-04-03 DIAGNOSIS — Z20.822 EXPOSURE TO COVID-19 VIRUS: ICD-10-CM

## 2020-04-03 PROCEDURE — 99441 TELEPHONE E&M BY PHYSICIAN EST PT NOT ORIG PREV 7 DAYS 5-10 MIN: CPT | Performed by: FAMILY MEDICINE

## 2020-04-08 ENCOUNTER — TELEPHONE (OUTPATIENT)
Dept: OTHER | Age: 35
End: 2020-04-08

## 2020-04-10 ENCOUNTER — TELEPHONE (OUTPATIENT)
Dept: OTHER | Age: 35
End: 2020-04-10

## 2020-05-18 ENCOUNTER — E-ADVICE (OUTPATIENT)
Dept: FAMILY MEDICINE | Age: 35
End: 2020-05-18

## 2020-05-18 DIAGNOSIS — L70.0 SUPERFICIAL MIXED COMEDONAL AND INFLAMMATORY ACNE VULGARIS: ICD-10-CM

## 2020-05-18 DIAGNOSIS — Z86.69 HISTORY OF MIGRAINE HEADACHES: ICD-10-CM

## 2020-05-18 RX ORDER — CLINDAMYCIN PHOSPHATE 11.9 MG/ML
SOLUTION TOPICAL 2 TIMES DAILY
Qty: 60 ML | Refills: 5 | Status: SHIPPED | OUTPATIENT
Start: 2020-05-18

## 2020-05-18 RX ORDER — CEFUROXIME AXETIL 250 MG/1
6 TABLET ORAL
Qty: 4 ML | Refills: 3 | Status: SHIPPED | OUTPATIENT
Start: 2020-05-18 | End: 2020-10-29 | Stop reason: SDUPTHER

## 2020-07-13 ENCOUNTER — E-ADVICE (OUTPATIENT)
Dept: FAMILY MEDICINE | Age: 35
End: 2020-07-13

## 2020-09-08 ENCOUNTER — WALK IN (OUTPATIENT)
Dept: URGENT CARE | Age: 35
End: 2020-09-08

## 2020-09-08 DIAGNOSIS — H57.89 EYE SWELLING: Primary | ICD-10-CM

## 2020-09-08 PROCEDURE — 99214 OFFICE O/P EST MOD 30 MIN: CPT | Performed by: NURSE PRACTITIONER

## 2020-09-08 RX ORDER — CLINDAMYCIN HYDROCHLORIDE 300 MG/1
300 CAPSULE ORAL 3 TIMES DAILY
Qty: 21 CAPSULE | Refills: 0 | Status: SHIPPED | OUTPATIENT
Start: 2020-09-08 | End: 2020-09-15

## 2020-09-08 RX ORDER — PREDNISONE 20 MG/1
60 TABLET ORAL ONCE
Status: COMPLETED | OUTPATIENT
Start: 2020-09-08 | End: 2020-09-08

## 2020-09-08 RX ORDER — PREDNISONE 20 MG/1
60 TABLET ORAL DAILY
Qty: 12 TABLET | Refills: 0 | Status: SHIPPED | OUTPATIENT
Start: 2020-09-08 | End: 2020-09-12

## 2020-09-08 RX ADMIN — PREDNISONE 60 MG: 20 TABLET ORAL at 11:48

## 2020-09-08 ASSESSMENT — ENCOUNTER SYMPTOMS
EYE ITCHING: 0
FEVER: 0
EYE PAIN: 0
CHILLS: 0
EYE DISCHARGE: 0
EYE REDNESS: 0

## 2020-09-08 ASSESSMENT — PAIN SCALES - GENERAL: PAINLEVEL: 5-6

## 2020-09-09 ENCOUNTER — TELEPHONE (OUTPATIENT)
Dept: TELEHEALTH | Age: 35
End: 2020-09-09

## 2020-10-27 ENCOUNTER — E-ADVICE (OUTPATIENT)
Dept: FAMILY MEDICINE | Age: 35
End: 2020-10-27

## 2020-10-29 DIAGNOSIS — Z86.69 HISTORY OF MIGRAINE HEADACHES: ICD-10-CM

## 2020-10-31 RX ORDER — CEFUROXIME AXETIL 250 MG/1
6 TABLET ORAL
Qty: 4 ML | Refills: 3 | Status: SHIPPED | OUTPATIENT
Start: 2020-10-31 | End: 2021-12-03 | Stop reason: SDUPTHER

## 2020-11-13 ENCOUNTER — NURSE ONLY (OUTPATIENT)
Dept: FAMILY MEDICINE | Age: 35
End: 2020-11-13
Attending: FAMILY MEDICINE

## 2020-11-13 DIAGNOSIS — Z23 NEED FOR PROPHYLACTIC VACCINATION AND INOCULATION AGAINST INFLUENZA: Primary | ICD-10-CM

## 2020-11-13 PROCEDURE — 90471 IMMUNIZATION ADMIN: CPT

## 2020-11-13 PROCEDURE — 90686 IIV4 VACC NO PRSV 0.5 ML IM: CPT

## 2020-11-13 PROCEDURE — 10002800 HB RX 250 W HCPCS

## 2020-11-13 RX ADMIN — INFLUENZA A VIRUS A/GUANGDONG-MAONAN/SWL1536/2019 CNIC-1909 (H1N1) ANTIGEN (FORMALDEHYDE INACTIVATED), INFLUENZA A VIRUS A/HONG KONG/2671/2019 (H3N2) ANTIGEN (FORMALDEHYDE INACTIVATED), INFLUENZA B VIRUS B/PHUKET/3073/2013 ANTIGEN (FORMALDEHYDE INACTIVATED), AND INFLUENZA B VIRUS B/WASHINGTON/02/2019 ANTIGEN (FORMALDEHYDE INACTIVATED) 0.5 ML: 15; 15; 15; 15 INJECTION, SUSPENSION INTRAMUSCULAR at 14:48

## 2020-11-23 ENCOUNTER — APPOINTMENT (OUTPATIENT)
Dept: FAMILY MEDICINE | Age: 35
End: 2020-11-23

## 2021-01-08 ENCOUNTER — WALK IN (OUTPATIENT)
Dept: URGENT CARE | Age: 36
End: 2021-01-08

## 2021-01-08 VITALS
HEART RATE: 68 BPM | SYSTOLIC BLOOD PRESSURE: 111 MMHG | BODY MASS INDEX: 25.27 KG/M2 | RESPIRATION RATE: 15 BRPM | HEIGHT: 64 IN | OXYGEN SATURATION: 100 % | WEIGHT: 148 LBS | TEMPERATURE: 98.3 F | DIASTOLIC BLOOD PRESSURE: 58 MMHG

## 2021-01-08 DIAGNOSIS — H92.02 OTALGIA OF LEFT EAR: Primary | ICD-10-CM

## 2021-01-08 DIAGNOSIS — H92.12 DRAINAGE FROM EAR, LEFT: ICD-10-CM

## 2021-01-08 PROCEDURE — 99213 OFFICE O/P EST LOW 20 MIN: CPT | Performed by: NURSE PRACTITIONER

## 2021-01-08 RX ORDER — OFLOXACIN 3 MG/ML
10 SOLUTION AURICULAR (OTIC) DAILY
Qty: 5 ML | Refills: 0 | Status: SHIPPED | OUTPATIENT
Start: 2021-01-08 | End: 2021-01-15

## 2021-01-08 RX ORDER — AMOXICILLIN AND CLAVULANATE POTASSIUM 875; 125 MG/1; MG/1
1 TABLET, FILM COATED ORAL EVERY 12 HOURS
Qty: 20 TABLET | Refills: 0 | Status: SHIPPED | OUTPATIENT
Start: 2021-01-08 | End: 2021-01-25 | Stop reason: ALTCHOICE

## 2021-01-08 ASSESSMENT — ENCOUNTER SYMPTOMS: FEVER: 0

## 2021-01-25 ENCOUNTER — OFFICE VISIT (OUTPATIENT)
Dept: FAMILY MEDICINE | Age: 36
End: 2021-01-25
Attending: FAMILY MEDICINE

## 2021-01-25 VITALS
HEIGHT: 64 IN | BODY MASS INDEX: 25.76 KG/M2 | DIASTOLIC BLOOD PRESSURE: 70 MMHG | WEIGHT: 150.91 LBS | SYSTOLIC BLOOD PRESSURE: 138 MMHG | HEART RATE: 68 BPM

## 2021-01-25 DIAGNOSIS — Z11.3 SCREEN FOR STD (SEXUALLY TRANSMITTED DISEASE): ICD-10-CM

## 2021-01-25 DIAGNOSIS — Z12.4 CERVICAL CANCER SCREENING: ICD-10-CM

## 2021-01-25 DIAGNOSIS — Z01.419 WELL WOMAN EXAM WITH ROUTINE GYNECOLOGICAL EXAM: Primary | ICD-10-CM

## 2021-01-25 PROCEDURE — 88175 CYTOPATH C/V AUTO FLUID REDO: CPT | Performed by: FAMILY MEDICINE

## 2021-01-25 PROCEDURE — 87591 N.GONORRHOEAE DNA AMP PROB: CPT | Performed by: FAMILY MEDICINE

## 2021-01-25 PROCEDURE — 99395 PREV VISIT EST AGE 18-39: CPT | Performed by: FAMILY MEDICINE

## 2021-01-25 PROCEDURE — 87661 TRICHOMONAS VAGINALIS AMPLIF: CPT | Performed by: FAMILY MEDICINE

## 2021-01-25 PROCEDURE — 87624 HPV HI-RISK TYP POOLED RSLT: CPT | Performed by: FAMILY MEDICINE

## 2021-01-26 LAB
C TRACH RRNA SPEC QL NAA+PROBE: NEGATIVE
Lab: NORMAL
N GONORRHOEA RRNA SPEC QL NAA+PROBE: NEGATIVE
T VAGINALIS RRNA SPEC QL NAA+PROBE: NEGATIVE

## 2021-02-03 LAB
CASE RPRT: NORMAL
CYTOLOGY CVX/VAG STUDY: NORMAL
HPV16+18+45 E6+E7MRNA CVX NAA+PROBE: NEGATIVE
Lab: NORMAL
PAP EDUCATIONAL NOTE: NORMAL
SPECIMEN ADEQUACY: NORMAL

## 2021-02-08 ENCOUNTER — TELEPHONE (OUTPATIENT)
Dept: FAMILY MEDICINE | Age: 36
End: 2021-02-08

## 2021-02-10 ENCOUNTER — E-ADVICE (OUTPATIENT)
Dept: FAMILY MEDICINE | Age: 36
End: 2021-02-10

## 2021-02-17 ENCOUNTER — E-ADVICE (OUTPATIENT)
Dept: FAMILY MEDICINE | Age: 36
End: 2021-02-17

## 2021-04-21 ENCOUNTER — E-ADVICE (OUTPATIENT)
Dept: FAMILY MEDICINE | Age: 36
End: 2021-04-21

## 2021-05-24 VITALS
DIASTOLIC BLOOD PRESSURE: 58 MMHG | OXYGEN SATURATION: 99 % | RESPIRATION RATE: 14 BRPM | RESPIRATION RATE: 16 BRPM | TEMPERATURE: 98.2 F | WEIGHT: 153.22 LBS | HEART RATE: 64 BPM | OXYGEN SATURATION: 98 % | DIASTOLIC BLOOD PRESSURE: 68 MMHG | HEART RATE: 74 BPM | BODY MASS INDEX: 26.16 KG/M2 | SYSTOLIC BLOOD PRESSURE: 116 MMHG | TEMPERATURE: 97.3 F | HEIGHT: 64 IN | SYSTOLIC BLOOD PRESSURE: 107 MMHG

## 2021-05-31 ENCOUNTER — E-ADVICE (OUTPATIENT)
Dept: FAMILY MEDICINE | Age: 36
End: 2021-05-31

## 2021-06-01 ENCOUNTER — TELEPHONE (OUTPATIENT)
Dept: FAMILY MEDICINE | Age: 36
End: 2021-06-01

## 2021-07-07 ENCOUNTER — E-ADVICE (OUTPATIENT)
Dept: FAMILY MEDICINE | Age: 36
End: 2021-07-07

## 2021-07-09 ENCOUNTER — E-ADVICE (OUTPATIENT)
Dept: FAMILY MEDICINE | Age: 36
End: 2021-07-09

## 2021-08-21 ENCOUNTER — E-ADVICE (OUTPATIENT)
Dept: FAMILY MEDICINE | Age: 36
End: 2021-08-21

## 2021-11-04 ENCOUNTER — WALK IN (OUTPATIENT)
Dept: URGENT CARE | Age: 36
End: 2021-11-04

## 2021-11-04 VITALS
HEART RATE: 64 BPM | BODY MASS INDEX: 26.46 KG/M2 | DIASTOLIC BLOOD PRESSURE: 70 MMHG | OXYGEN SATURATION: 100 % | SYSTOLIC BLOOD PRESSURE: 115 MMHG | HEIGHT: 64 IN | TEMPERATURE: 97.8 F | RESPIRATION RATE: 17 BRPM | WEIGHT: 154.98 LBS

## 2021-11-04 DIAGNOSIS — N30.00 ACUTE CYSTITIS WITHOUT HEMATURIA: Primary | ICD-10-CM

## 2021-11-04 LAB
APPEARANCE, POC: ABNORMAL
BILIRUBIN, POC: NEGATIVE
COLOR, POC: YELLOW
GLUCOSE UR-MCNC: NEGATIVE MG/DL
KETONES, POC: NEGATIVE MG/DL
NITRITE, POC: NEGATIVE
OCCULT BLOOD, POC: ABNORMAL
PH UR: 6.5 [PH] (ref 5–7)
PROT UR-MCNC: NEGATIVE MG/DL
SP GR UR: 1.03 (ref 1–1.03)
UROBILINOGEN UR-MCNC: 0.2 MG/DL (ref 0–1)
WBC (LEUKOCYTE) ESTERASE, POC: ABNORMAL

## 2021-11-04 PROCEDURE — 81002 URINALYSIS NONAUTO W/O SCOPE: CPT | Performed by: EMERGENCY MEDICINE

## 2021-11-04 PROCEDURE — 87086 URINE CULTURE/COLONY COUNT: CPT | Performed by: INTERNAL MEDICINE

## 2021-11-04 PROCEDURE — 87088 URINE BACTERIA CULTURE: CPT | Performed by: INTERNAL MEDICINE

## 2021-11-04 PROCEDURE — 99214 OFFICE O/P EST MOD 30 MIN: CPT | Performed by: EMERGENCY MEDICINE

## 2021-11-04 PROCEDURE — 87186 SC STD MICRODIL/AGAR DIL: CPT | Performed by: INTERNAL MEDICINE

## 2021-11-04 RX ORDER — AMOXICILLIN AND CLAVULANATE POTASSIUM 500; 125 MG/1; MG/1
1 TABLET, FILM COATED ORAL EVERY 12 HOURS
Qty: 14 TABLET | Refills: 0 | Status: SHIPPED | OUTPATIENT
Start: 2021-11-04 | End: 2021-11-11

## 2021-11-06 ENCOUNTER — TELEPHONE (OUTPATIENT)
Dept: URGENT CARE | Age: 36
End: 2021-11-06

## 2021-11-06 ENCOUNTER — NURSE TRIAGE (OUTPATIENT)
Dept: TELEHEALTH | Age: 36
End: 2021-11-06

## 2021-11-06 LAB — BACTERIA UR CULT: ABNORMAL

## 2021-11-08 ENCOUNTER — E-ADVICE (OUTPATIENT)
Dept: FAMILY MEDICINE | Age: 36
End: 2021-11-08

## 2021-12-03 DIAGNOSIS — Z86.69 HISTORY OF MIGRAINE HEADACHES: ICD-10-CM

## 2021-12-05 RX ORDER — CEFUROXIME AXETIL 250 MG/1
6 TABLET ORAL
Qty: 4 ML | Refills: 3 | Status: SHIPPED | OUTPATIENT
Start: 2021-12-05 | End: 2021-12-17

## 2021-12-16 DIAGNOSIS — Z86.69 HISTORY OF MIGRAINE HEADACHES: ICD-10-CM

## 2021-12-17 RX ORDER — CEFUROXIME AXETIL 250 MG/1
TABLET ORAL
Qty: 4 ML | Refills: 3 | Status: SHIPPED | OUTPATIENT
Start: 2021-12-17 | End: 2023-02-01 | Stop reason: SDUPTHER

## 2022-05-02 ENCOUNTER — E-ADVICE (OUTPATIENT)
Dept: FAMILY MEDICINE | Age: 37
End: 2022-05-02

## 2022-05-05 ENCOUNTER — TELEPHONE (OUTPATIENT)
Dept: TELEHEALTH | Age: 37
End: 2022-05-05

## 2022-06-09 ENCOUNTER — WALK IN (OUTPATIENT)
Dept: URGENT CARE | Age: 37
End: 2022-06-09

## 2022-06-09 VITALS
HEIGHT: 64 IN | TEMPERATURE: 98.1 F | DIASTOLIC BLOOD PRESSURE: 72 MMHG | SYSTOLIC BLOOD PRESSURE: 96 MMHG | HEART RATE: 68 BPM | BODY MASS INDEX: 25.61 KG/M2 | OXYGEN SATURATION: 99 % | WEIGHT: 150 LBS | RESPIRATION RATE: 18 BRPM

## 2022-06-09 DIAGNOSIS — H92.03 OTALGIA OF BOTH EARS: Primary | ICD-10-CM

## 2022-06-09 PROCEDURE — 99214 OFFICE O/P EST MOD 30 MIN: CPT | Performed by: PHYSICIAN ASSISTANT

## 2022-06-09 ASSESSMENT — PAIN SCALES - GENERAL: PAINLEVEL: 4

## 2022-07-22 ENCOUNTER — TELEPHONE (OUTPATIENT)
Dept: FAMILY MEDICINE | Age: 37
End: 2022-07-22

## 2022-08-02 ENCOUNTER — OFFICE VISIT (OUTPATIENT)
Dept: FAMILY MEDICINE | Age: 37
End: 2022-08-02
Attending: STUDENT IN AN ORGANIZED HEALTH CARE EDUCATION/TRAINING PROGRAM

## 2022-08-02 VITALS
TEMPERATURE: 98.6 F | RESPIRATION RATE: 20 BRPM | SYSTOLIC BLOOD PRESSURE: 120 MMHG | HEART RATE: 86 BPM | BODY MASS INDEX: 26.01 KG/M2 | OXYGEN SATURATION: 97 % | HEIGHT: 64 IN | WEIGHT: 152.34 LBS | DIASTOLIC BLOOD PRESSURE: 82 MMHG

## 2022-08-02 DIAGNOSIS — R31.9 HEMATURIA, UNSPECIFIED TYPE: Primary | ICD-10-CM

## 2022-08-02 DIAGNOSIS — L70.0 ACNE VULGARIS: Chronic | ICD-10-CM

## 2022-08-02 LAB
APPEARANCE UR: ABNORMAL
APPEARANCE UR: ABNORMAL
BACTERIA #/AREA URNS HPF: ABNORMAL /HPF
BILIRUB UR QL STRIP: NEGATIVE
BILIRUB UR QL STRIP: NEGATIVE
COLOR UR: YELLOW
COLOR UR: YELLOW
GLUCOSE UR STRIP-MCNC: NEGATIVE MG/DL
GLUCOSE UR STRIP-MCNC: NEGATIVE MG/DL
HGB UR QL STRIP: ABNORMAL
HGB UR QL STRIP: ABNORMAL
HYALINE CASTS #/AREA URNS LPF: ABNORMAL /LPF
KETONES UR STRIP-MCNC: NEGATIVE MG/DL
KETONES UR STRIP-MCNC: NEGATIVE MG/DL
LEUKOCYTE ESTERASE UR QL STRIP: ABNORMAL
LEUKOCYTE ESTERASE UR QL STRIP: NEGATIVE
MUCOUS THREADS URNS QL MICRO: PRESENT
NITRITE UR QL STRIP: NEGATIVE
NITRITE UR QL STRIP: NEGATIVE
PH UR STRIP: 5 [PH] (ref 5–7)
PH UR STRIP: 5.5 UNITS (ref 5–7)
PROT UR STRIP-MCNC: 30 MG/DL
PROT UR STRIP-MCNC: NEGATIVE MG/DL
RBC #/AREA URNS HPF: ABNORMAL /HPF
SP GR UR STRIP: 1.03 (ref 1–1.03)
SP GR UR STRIP: >1.03 (ref 1–1.03)
SQUAMOUS #/AREA URNS HPF: ABNORMAL /HPF
UROBILINOGEN UR STRIP-MCNC: 0.2 MG/DL
UROBILINOGEN UR STRIP-MCNC: 0.2 MG/DL
WBC #/AREA URNS HPF: ABNORMAL /HPF

## 2022-08-02 PROCEDURE — 99215 OFFICE O/P EST HI 40 MIN: CPT | Performed by: STUDENT IN AN ORGANIZED HEALTH CARE EDUCATION/TRAINING PROGRAM

## 2022-08-02 PROCEDURE — 81001 URINALYSIS AUTO W/SCOPE: CPT | Performed by: STUDENT IN AN ORGANIZED HEALTH CARE EDUCATION/TRAINING PROGRAM

## 2022-08-02 PROCEDURE — 99211 OFF/OP EST MAY X REQ PHY/QHP: CPT

## 2022-08-02 PROCEDURE — 87086 URINE CULTURE/COLONY COUNT: CPT | Performed by: STUDENT IN AN ORGANIZED HEALTH CARE EDUCATION/TRAINING PROGRAM

## 2022-08-02 PROCEDURE — 87481 CANDIDA DNA AMP PROBE: CPT | Performed by: STUDENT IN AN ORGANIZED HEALTH CARE EDUCATION/TRAINING PROGRAM

## 2022-08-02 PROCEDURE — 81003 URINALYSIS AUTO W/O SCOPE: CPT | Performed by: STUDENT IN AN ORGANIZED HEALTH CARE EDUCATION/TRAINING PROGRAM

## 2022-08-02 ASSESSMENT — PATIENT HEALTH QUESTIONNAIRE - PHQ9
CLINICAL INTERPRETATION OF PHQ2 SCORE: NO FURTHER SCREENING NEEDED
2. FEELING DOWN, DEPRESSED OR HOPELESS: NOT AT ALL
SUM OF ALL RESPONSES TO PHQ9 QUESTIONS 1 AND 2: 0
SUM OF ALL RESPONSES TO PHQ9 QUESTIONS 1 AND 2: 0
1. LITTLE INTEREST OR PLEASURE IN DOING THINGS: NOT AT ALL

## 2022-08-03 LAB — BACTERIA UR CULT: ABNORMAL

## 2022-08-04 LAB
BACTERIAL VAGINOSIS VAG-IMP: POSITIVE
C ALBICANS DNA VAG QL NAA+PROBE: NOT DETECTED
C GLABRATA DNA VAG QL NAA+PROBE: NOT DETECTED
M GENITALIUM DNA SPEC QL NAA+PROBE: NOT DETECTED
SERVICE CMNT-IMP: ABNORMAL
T VAGINALIS DNA VAG QL NAA+PROBE: NOT DETECTED

## 2022-08-05 ENCOUNTER — TELEPHONE (OUTPATIENT)
Dept: FAMILY MEDICINE | Age: 37
End: 2022-08-05

## 2022-08-05 DIAGNOSIS — N76.0 BV (BACTERIAL VAGINOSIS): Primary | ICD-10-CM

## 2022-08-05 DIAGNOSIS — B96.89 BV (BACTERIAL VAGINOSIS): Primary | ICD-10-CM

## 2022-08-05 RX ORDER — METRONIDAZOLE 500 MG/1
500 TABLET ORAL 2 TIMES DAILY
Qty: 14 TABLET | Refills: 0 | Status: SHIPPED | OUTPATIENT
Start: 2022-08-05 | End: 2022-08-12

## 2022-08-07 PROBLEM — R31.9 HEMATURIA: Status: ACTIVE | Noted: 2022-08-07

## 2022-08-26 ENCOUNTER — E-ADVICE (OUTPATIENT)
Dept: FAMILY MEDICINE | Age: 37
End: 2022-08-26

## 2022-09-02 ENCOUNTER — LAB SERVICES (OUTPATIENT)
Dept: LAB | Age: 37
End: 2022-09-02

## 2022-09-02 DIAGNOSIS — R31.9 HEMATURIA, UNSPECIFIED TYPE: ICD-10-CM

## 2022-09-02 LAB
APPEARANCE UR: CLEAR
BACTERIA #/AREA URNS HPF: ABNORMAL /HPF
BILIRUB UR QL STRIP: NEGATIVE
COLOR UR: YELLOW
GLUCOSE UR STRIP-MCNC: NEGATIVE MG/DL
HGB UR QL STRIP: ABNORMAL
HYALINE CASTS #/AREA URNS LPF: ABNORMAL /LPF
KETONES UR STRIP-MCNC: NEGATIVE MG/DL
LEUKOCYTE ESTERASE UR QL STRIP: NEGATIVE
MUCOUS THREADS URNS QL MICRO: PRESENT
NITRITE UR QL STRIP: NEGATIVE
PH UR STRIP: 5 [PH] (ref 5–7)
PROT UR STRIP-MCNC: NEGATIVE MG/DL
RBC #/AREA URNS HPF: ABNORMAL /HPF
SP GR UR STRIP: 1.01 (ref 1–1.03)
SQUAMOUS #/AREA URNS HPF: ABNORMAL /HPF
UROBILINOGEN UR STRIP-MCNC: 0.2 MG/DL
WBC #/AREA URNS HPF: ABNORMAL /HPF

## 2022-09-02 PROCEDURE — 81001 URINALYSIS AUTO W/SCOPE: CPT | Performed by: INTERNAL MEDICINE

## 2022-11-18 ENCOUNTER — OFFICE VISIT (OUTPATIENT)
Dept: UROLOGY | Age: 37
End: 2022-11-18

## 2022-11-18 VITALS
OXYGEN SATURATION: 98 % | RESPIRATION RATE: 16 BRPM | WEIGHT: 158.73 LBS | SYSTOLIC BLOOD PRESSURE: 97 MMHG | DIASTOLIC BLOOD PRESSURE: 70 MMHG | BODY MASS INDEX: 27.25 KG/M2 | HEART RATE: 88 BPM

## 2022-11-18 DIAGNOSIS — R31.9 HEMATURIA, UNSPECIFIED TYPE: Primary | ICD-10-CM

## 2022-11-18 PROBLEM — R31.29 HEMATURIA, MICROSCOPIC: Status: ACTIVE | Noted: 2022-11-18

## 2022-11-18 LAB
APPEARANCE, POC: CLEAR
BILIRUBIN, POC: NEGATIVE
COLOR, POC: YELLOW
GLUCOSE UR-MCNC: NEGATIVE MG/DL
KETONES, POC: NEGATIVE MG/DL
NITRITE, POC: NEGATIVE
OCCULT BLOOD, POC: ABNORMAL
PH UR: 5 [PH] (ref 5–7)
PROT UR-MCNC: NEGATIVE MG/DL
SP GR UR: 1.02 (ref 1–1.03)
UROBILINOGEN UR-MCNC: 0.2 MG/DL (ref 0–1)
WBC (LEUKOCYTE) ESTERASE, POC: NEGATIVE

## 2022-11-18 PROCEDURE — 81002 URINALYSIS NONAUTO W/O SCOPE: CPT | Performed by: UROLOGY

## 2022-11-18 PROCEDURE — 99203 OFFICE O/P NEW LOW 30 MIN: CPT | Performed by: UROLOGY

## 2022-11-25 ENCOUNTER — HOSPITAL ENCOUNTER (OUTPATIENT)
Dept: ULTRASOUND IMAGING | Age: 37
Discharge: HOME | End: 2022-11-25
Attending: UROLOGY

## 2022-11-25 DIAGNOSIS — R31.9 HEMATURIA, UNSPECIFIED TYPE: ICD-10-CM

## 2022-11-25 PROCEDURE — 76770 US EXAM ABDO BACK WALL COMP: CPT

## 2022-11-25 PROCEDURE — 76770 US EXAM ABDO BACK WALL COMP: CPT | Performed by: RADIOLOGY

## 2022-11-28 ENCOUNTER — TELEPHONE (OUTPATIENT)
Dept: UROLOGY | Age: 37
End: 2022-11-28

## 2022-11-28 DIAGNOSIS — R31.9 HEMATURIA, UNSPECIFIED TYPE: Primary | ICD-10-CM

## 2022-12-13 ENCOUNTER — OFFICE VISIT (OUTPATIENT)
Dept: FAMILY MEDICINE | Age: 37
End: 2022-12-13
Attending: STUDENT IN AN ORGANIZED HEALTH CARE EDUCATION/TRAINING PROGRAM

## 2022-12-13 VITALS
TEMPERATURE: 97.1 F | RESPIRATION RATE: 16 BRPM | DIASTOLIC BLOOD PRESSURE: 64 MMHG | BODY MASS INDEX: 27.31 KG/M2 | OXYGEN SATURATION: 99 % | WEIGHT: 159.94 LBS | HEIGHT: 64 IN | HEART RATE: 70 BPM | SYSTOLIC BLOOD PRESSURE: 102 MMHG

## 2022-12-13 DIAGNOSIS — N89.8 VAGINAL DISCHARGE: Primary | ICD-10-CM

## 2022-12-13 DIAGNOSIS — Z23 NEED FOR VACCINATION: ICD-10-CM

## 2022-12-13 DIAGNOSIS — Z86.69 HISTORY OF MIGRAINE HEADACHES: ICD-10-CM

## 2022-12-13 PROCEDURE — 87481 CANDIDA DNA AMP PROBE: CPT | Performed by: STUDENT IN AN ORGANIZED HEALTH CARE EDUCATION/TRAINING PROGRAM

## 2022-12-13 PROCEDURE — 10002800 HB RX 250 W HCPCS: Performed by: STUDENT IN AN ORGANIZED HEALTH CARE EDUCATION/TRAINING PROGRAM

## 2022-12-13 PROCEDURE — 90471 IMMUNIZATION ADMIN: CPT | Performed by: STUDENT IN AN ORGANIZED HEALTH CARE EDUCATION/TRAINING PROGRAM

## 2022-12-13 PROCEDURE — 99214 OFFICE O/P EST MOD 30 MIN: CPT | Performed by: STUDENT IN AN ORGANIZED HEALTH CARE EDUCATION/TRAINING PROGRAM

## 2022-12-13 PROCEDURE — 99211 OFF/OP EST MAY X REQ PHY/QHP: CPT

## 2022-12-13 PROCEDURE — 87491 CHLMYD TRACH DNA AMP PROBE: CPT | Performed by: STUDENT IN AN ORGANIZED HEALTH CARE EDUCATION/TRAINING PROGRAM

## 2022-12-13 PROCEDURE — 90715 TDAP VACCINE 7 YRS/> IM: CPT | Performed by: STUDENT IN AN ORGANIZED HEALTH CARE EDUCATION/TRAINING PROGRAM

## 2022-12-13 RX ADMIN — TETANUS TOXOID, REDUCED DIPHTHERIA TOXOID AND ACELLULAR PERTUSSIS VACCINE, ADSORBED 0.5 ML: 5; 2.5; 8; 8; 2.5 SUSPENSION INTRAMUSCULAR at 09:37

## 2022-12-13 ASSESSMENT — PATIENT HEALTH QUESTIONNAIRE - PHQ9
CLINICAL INTERPRETATION OF PHQ2 SCORE: NO FURTHER SCREENING NEEDED
SUM OF ALL RESPONSES TO PHQ9 QUESTIONS 1 AND 2: 0
1. LITTLE INTEREST OR PLEASURE IN DOING THINGS: NOT AT ALL
2. FEELING DOWN, DEPRESSED OR HOPELESS: NOT AT ALL
SUM OF ALL RESPONSES TO PHQ9 QUESTIONS 1 AND 2: 0

## 2022-12-14 PROBLEM — N89.8 VAGINAL DISCHARGE: Status: ACTIVE | Noted: 2022-12-14

## 2022-12-14 LAB
BACTERIAL VAGINOSIS VAG-IMP: NOT DETECTED
C ALBICANS DNA VAG QL NAA+PROBE: NOT DETECTED
C GLABRATA DNA VAG QL NAA+PROBE: NOT DETECTED
C TRACH RRNA SPEC QL NAA+PROBE: NEGATIVE
Lab: NORMAL
M GENITALIUM DNA SPEC QL NAA+PROBE: NOT DETECTED
N GONORRHOEA RRNA SPEC QL NAA+PROBE: NEGATIVE
SERVICE CMNT-IMP: NORMAL
T VAGINALIS DNA VAG QL NAA+PROBE: NOT DETECTED

## 2022-12-16 ENCOUNTER — HOSPITAL ENCOUNTER (OUTPATIENT)
Dept: CT IMAGING | Age: 37
Discharge: HOME | End: 2022-12-16
Attending: UROLOGY

## 2022-12-16 DIAGNOSIS — R31.9 HEMATURIA, UNSPECIFIED TYPE: ICD-10-CM

## 2022-12-16 PROCEDURE — 10002807 HB RX 258: Performed by: RADIOLOGY

## 2022-12-16 PROCEDURE — 10002805 HB CONTRAST AGENT: Performed by: RADIOLOGY

## 2022-12-16 PROCEDURE — 74178 CT ABD&PLV WO CNTR FLWD CNTR: CPT

## 2022-12-16 PROCEDURE — G1004 CDSM NDSC: HCPCS | Performed by: RADIOLOGY

## 2022-12-16 PROCEDURE — G1004 CDSM NDSC: HCPCS

## 2022-12-16 PROCEDURE — 74178 CT ABD&PLV WO CNTR FLWD CNTR: CPT | Performed by: RADIOLOGY

## 2022-12-16 PROCEDURE — 76377 3D RENDER W/INTRP POSTPROCES: CPT | Performed by: RADIOLOGY

## 2022-12-16 RX ADMIN — SODIUM CHLORIDE 100 ML: 9 INJECTION, SOLUTION INTRAVENOUS at 09:00

## 2022-12-16 RX ADMIN — IOHEXOL 180 ML: 350 INJECTION, SOLUTION INTRAVENOUS at 09:00

## 2023-01-10 ENCOUNTER — E-ADVICE (OUTPATIENT)
Dept: FAMILY MEDICINE | Age: 38
End: 2023-01-10

## 2023-01-13 ENCOUNTER — OFFICE VISIT (OUTPATIENT)
Dept: UROLOGY | Age: 38
End: 2023-01-13

## 2023-01-13 DIAGNOSIS — R31.9 HEMATURIA, UNSPECIFIED TYPE: Primary | ICD-10-CM

## 2023-01-13 LAB
APPEARANCE, POC: CLEAR
BILIRUBIN, POC: NEGATIVE
COLOR, POC: YELLOW
GLUCOSE UR-MCNC: NEGATIVE MG/DL
KETONES, POC: NEGATIVE MG/DL
NITRITE, POC: NEGATIVE
OCCULT BLOOD, POC: NEGATIVE
PH UR: 5 [PH] (ref 5–7)
PROT UR-MCNC: NEGATIVE MG/DL
SP GR UR: 1.02 (ref 1–1.03)
UROBILINOGEN UR-MCNC: 0.2 MG/DL (ref 0–1)
WBC (LEUKOCYTE) ESTERASE, POC: NEGATIVE

## 2023-01-13 PROCEDURE — 81002 URINALYSIS NONAUTO W/O SCOPE: CPT | Performed by: UROLOGY

## 2023-01-13 PROCEDURE — 52000 CYSTOURETHROSCOPY: CPT | Performed by: UROLOGY

## 2023-01-13 PROCEDURE — 99213 OFFICE O/P EST LOW 20 MIN: CPT | Performed by: UROLOGY

## 2023-02-01 DIAGNOSIS — Z86.69 HISTORY OF MIGRAINE HEADACHES: ICD-10-CM

## 2023-02-04 RX ORDER — CEFUROXIME AXETIL 250 MG/1
TABLET ORAL
Qty: 4 ML | Refills: 3 | Status: SHIPPED | OUTPATIENT
Start: 2023-02-04

## 2023-02-09 ENCOUNTER — TELEPHONE (OUTPATIENT)
Dept: FAMILY MEDICINE | Age: 38
End: 2023-02-09

## 2023-02-15 ENCOUNTER — TELEPHONE (OUTPATIENT)
Dept: FAMILY MEDICINE | Age: 38
End: 2023-02-15

## 2023-02-15 PROBLEM — R31.9 HEMATURIA: Status: RESOLVED | Noted: 2022-08-07 | Resolved: 2023-02-15

## 2023-07-31 ENCOUNTER — E-ADVICE (OUTPATIENT)
Dept: FAMILY MEDICINE | Age: 38
End: 2023-07-31

## 2023-07-31 ENCOUNTER — TELEPHONE (OUTPATIENT)
Dept: FAMILY MEDICINE | Age: 38
End: 2023-07-31

## 2023-08-03 ENCOUNTER — E-ADVICE (OUTPATIENT)
Dept: FAMILY MEDICINE | Age: 38
End: 2023-08-03

## 2023-08-18 ENCOUNTER — TELEPHONE (OUTPATIENT)
Dept: UROLOGY | Age: 38
End: 2023-08-18

## 2023-08-18 ENCOUNTER — E-ADVICE (OUTPATIENT)
Dept: FAMILY MEDICINE | Age: 38
End: 2023-08-18

## 2023-08-18 ENCOUNTER — LAB SERVICES (OUTPATIENT)
Dept: LAB | Age: 38
End: 2023-08-18

## 2023-08-18 DIAGNOSIS — R31.9 HEMATURIA, UNSPECIFIED TYPE: ICD-10-CM

## 2023-08-18 LAB
APPEARANCE UR: ABNORMAL
BACTERIA #/AREA URNS HPF: ABNORMAL /HPF
BILIRUB UR QL STRIP: NEGATIVE
COLOR UR: YELLOW
GLUCOSE UR STRIP-MCNC: NEGATIVE MG/DL
HGB UR QL STRIP: ABNORMAL
HYALINE CASTS #/AREA URNS LPF: ABNORMAL /LPF
KETONES UR STRIP-MCNC: NEGATIVE MG/DL
LEUKOCYTE ESTERASE UR QL STRIP: ABNORMAL
MUCOUS THREADS URNS QL MICRO: PRESENT
NITRITE UR QL STRIP: NEGATIVE
PH UR STRIP: 5.5 [PH] (ref 5–7)
PROT UR STRIP-MCNC: ABNORMAL MG/DL
RBC #/AREA URNS HPF: ABNORMAL /HPF
SP GR UR STRIP: 1.03 (ref 1–1.03)
SQUAMOUS #/AREA URNS HPF: ABNORMAL /HPF
UROBILINOGEN UR STRIP-MCNC: 0.2 MG/DL
WBC #/AREA URNS HPF: >100 /HPF

## 2023-08-18 PROCEDURE — 87086 URINE CULTURE/COLONY COUNT: CPT | Performed by: INTERNAL MEDICINE

## 2023-08-18 PROCEDURE — 87088 URINE BACTERIA CULTURE: CPT | Performed by: INTERNAL MEDICINE

## 2023-08-18 PROCEDURE — 87186 SC STD MICRODIL/AGAR DIL: CPT | Performed by: INTERNAL MEDICINE

## 2023-08-18 PROCEDURE — 81001 URINALYSIS AUTO W/SCOPE: CPT | Performed by: INTERNAL MEDICINE

## 2023-08-18 RX ORDER — PHENAZOPYRIDINE HYDROCHLORIDE 200 MG/1
200 TABLET, FILM COATED ORAL 3 TIMES DAILY PRN
Qty: 9 TABLET | Refills: 0 | Status: SHIPPED | OUTPATIENT
Start: 2023-08-18 | End: 2023-08-21

## 2023-08-18 RX ORDER — SULFAMETHOXAZOLE AND TRIMETHOPRIM 800; 160 MG/1; MG/1
1 TABLET ORAL 2 TIMES DAILY
Qty: 6 TABLET | Refills: 0 | Status: SHIPPED | OUTPATIENT
Start: 2023-08-18 | End: 2023-08-22 | Stop reason: ALTCHOICE

## 2023-08-20 LAB — BACTERIA UR CULT: ABNORMAL

## 2023-08-22 ENCOUNTER — TELEPHONE (OUTPATIENT)
Dept: UROLOGY | Age: 38
End: 2023-08-22

## 2023-08-22 RX ORDER — CEPHALEXIN 500 MG/1
500 CAPSULE ORAL 3 TIMES DAILY
Qty: 15 CAPSULE | Refills: 0 | Status: SHIPPED | OUTPATIENT
Start: 2023-08-22 | End: 2023-08-27

## 2023-08-28 ENCOUNTER — OFFICE VISIT (OUTPATIENT)
Age: 38
End: 2023-08-28
Payer: COMMERCIAL

## 2023-08-28 VITALS
OXYGEN SATURATION: 100 % | HEIGHT: 64 IN | HEART RATE: 73 BPM | SYSTOLIC BLOOD PRESSURE: 111 MMHG | DIASTOLIC BLOOD PRESSURE: 79 MMHG | BODY MASS INDEX: 30.39 KG/M2 | WEIGHT: 178 LBS | RESPIRATION RATE: 18 BRPM | TEMPERATURE: 98.2 F

## 2023-08-28 DIAGNOSIS — K21.9 GASTROESOPHAGEAL REFLUX DISEASE WITHOUT ESOPHAGITIS: ICD-10-CM

## 2023-08-28 DIAGNOSIS — R51.9 CHRONIC DAILY HEADACHE: Primary | ICD-10-CM

## 2023-08-28 DIAGNOSIS — T78.40XA ALLERGY, INITIAL ENCOUNTER: ICD-10-CM

## 2023-08-28 DIAGNOSIS — E66.9 OBESITY (BMI 30-39.9): ICD-10-CM

## 2023-08-28 DIAGNOSIS — Z76.89 ENCOUNTER TO ESTABLISH CARE: ICD-10-CM

## 2023-08-28 DIAGNOSIS — N92.0 MENORRHAGIA WITH REGULAR CYCLE: ICD-10-CM

## 2023-08-28 LAB
ALBUMIN SERPL-MCNC: 3.8 G/DL (ref 3.5–5)
ALBUMIN/GLOB SERPL: 1.1 (ref 1.1–2.2)
ALP SERPL-CCNC: 67 U/L (ref 45–117)
ALT SERPL-CCNC: 26 U/L (ref 12–78)
ANION GAP SERPL CALC-SCNC: 1 MMOL/L (ref 5–15)
AST SERPL-CCNC: 19 U/L (ref 15–37)
BASOPHILS # BLD: 0 K/UL (ref 0–0.1)
BASOPHILS NFR BLD: 1 % (ref 0–1)
BILIRUB SERPL-MCNC: 0.2 MG/DL (ref 0.2–1)
BUN SERPL-MCNC: 10 MG/DL (ref 6–20)
BUN/CREAT SERPL: 13 (ref 12–20)
CALCIUM SERPL-MCNC: 8.8 MG/DL (ref 8.5–10.1)
CHLORIDE SERPL-SCNC: 108 MMOL/L (ref 97–108)
CHOLEST SERPL-MCNC: 162 MG/DL
CO2 SERPL-SCNC: 29 MMOL/L (ref 21–32)
CREAT SERPL-MCNC: 0.8 MG/DL (ref 0.55–1.02)
DIFFERENTIAL METHOD BLD: ABNORMAL
EOSINOPHIL # BLD: 0.2 K/UL (ref 0–0.4)
EOSINOPHIL NFR BLD: 3 % (ref 0–7)
ERYTHROCYTE [DISTWIDTH] IN BLOOD BY AUTOMATED COUNT: 12.4 % (ref 11.5–14.5)
EST. AVERAGE GLUCOSE BLD GHB EST-MCNC: 105 MG/DL
GLOBULIN SER CALC-MCNC: 3.4 G/DL (ref 2–4)
GLUCOSE SERPL-MCNC: 108 MG/DL (ref 65–100)
HBA1C MFR BLD: 5.3 % (ref 4–5.6)
HCT VFR BLD AUTO: 38.4 % (ref 35–47)
HCV AB SER IA-ACNC: 0.44 INDEX
HCV AB SERPL QL IA: NONREACTIVE
HDLC SERPL-MCNC: 49 MG/DL
HDLC SERPL: 3.3 (ref 0–5)
HGB BLD-MCNC: 11.5 G/DL (ref 11.5–16)
IMM GRANULOCYTES # BLD AUTO: 0 K/UL (ref 0–0.04)
IMM GRANULOCYTES NFR BLD AUTO: 0 % (ref 0–0.5)
LDLC SERPL CALC-MCNC: 95 MG/DL (ref 0–100)
LYMPHOCYTES # BLD: 1.7 K/UL (ref 0.8–3.5)
LYMPHOCYTES NFR BLD: 29 % (ref 12–49)
MCH RBC QN AUTO: 26.6 PG (ref 26–34)
MCHC RBC AUTO-ENTMCNC: 29.9 G/DL (ref 30–36.5)
MCV RBC AUTO: 88.7 FL (ref 80–99)
MONOCYTES # BLD: 0.5 K/UL (ref 0–1)
MONOCYTES NFR BLD: 9 % (ref 5–13)
NEUTS SEG # BLD: 3.4 K/UL (ref 1.8–8)
NEUTS SEG NFR BLD: 58 % (ref 32–75)
NRBC # BLD: 0 K/UL (ref 0–0.01)
NRBC BLD-RTO: 0 PER 100 WBC
PLATELET # BLD AUTO: 255 K/UL (ref 150–400)
PMV BLD AUTO: 10.6 FL (ref 8.9–12.9)
POTASSIUM SERPL-SCNC: 4 MMOL/L (ref 3.5–5.1)
PROT SERPL-MCNC: 7.2 G/DL (ref 6.4–8.2)
RBC # BLD AUTO: 4.33 M/UL (ref 3.8–5.2)
SODIUM SERPL-SCNC: 138 MMOL/L (ref 136–145)
TRIGL SERPL-MCNC: 90 MG/DL
TSH SERPL DL<=0.05 MIU/L-ACNC: 1.09 UIU/ML (ref 0.36–3.74)
VLDLC SERPL CALC-MCNC: 18 MG/DL
WBC # BLD AUTO: 5.9 K/UL (ref 3.6–11)

## 2023-08-28 PROCEDURE — 99204 OFFICE O/P NEW MOD 45 MIN: CPT | Performed by: NURSE PRACTITIONER

## 2023-08-28 RX ORDER — ACETAMINOPHEN 500 MG
500 TABLET ORAL EVERY 6 HOURS PRN
COMMUNITY

## 2023-08-28 RX ORDER — OMEPRAZOLE 40 MG/1
40 CAPSULE, DELAYED RELEASE ORAL EVERY MORNING
COMMUNITY
Start: 2023-08-15

## 2023-08-28 SDOH — ECONOMIC STABILITY: FOOD INSECURITY: WITHIN THE PAST 12 MONTHS, YOU WORRIED THAT YOUR FOOD WOULD RUN OUT BEFORE YOU GOT MONEY TO BUY MORE.: NEVER TRUE

## 2023-08-28 SDOH — ECONOMIC STABILITY: HOUSING INSECURITY
IN THE LAST 12 MONTHS, WAS THERE A TIME WHEN YOU DID NOT HAVE A STEADY PLACE TO SLEEP OR SLEPT IN A SHELTER (INCLUDING NOW)?: NO

## 2023-08-28 SDOH — ECONOMIC STABILITY: FOOD INSECURITY: WITHIN THE PAST 12 MONTHS, THE FOOD YOU BOUGHT JUST DIDN'T LAST AND YOU DIDN'T HAVE MONEY TO GET MORE.: NEVER TRUE

## 2023-08-28 SDOH — ECONOMIC STABILITY: INCOME INSECURITY: HOW HARD IS IT FOR YOU TO PAY FOR THE VERY BASICS LIKE FOOD, HOUSING, MEDICAL CARE, AND HEATING?: NOT HARD AT ALL

## 2023-08-28 ASSESSMENT — PATIENT HEALTH QUESTIONNAIRE - PHQ9
SUM OF ALL RESPONSES TO PHQ QUESTIONS 1-9: 0
SUM OF ALL RESPONSES TO PHQ QUESTIONS 1-9: 0
1. LITTLE INTEREST OR PLEASURE IN DOING THINGS: 0
SUM OF ALL RESPONSES TO PHQ QUESTIONS 1-9: 0
2. FEELING DOWN, DEPRESSED OR HOPELESS: 0
SUM OF ALL RESPONSES TO PHQ9 QUESTIONS 1 & 2: 0
SUM OF ALL RESPONSES TO PHQ QUESTIONS 1-9: 0

## 2023-09-11 ENCOUNTER — E-ADVICE (OUTPATIENT)
Dept: FAMILY MEDICINE | Age: 38
End: 2023-09-11

## 2023-10-20 ENCOUNTER — TELEPHONE (OUTPATIENT)
Dept: FAMILY MEDICINE | Age: 38
End: 2023-10-20

## 2023-10-30 ENCOUNTER — OFFICE VISIT (OUTPATIENT)
Dept: FAMILY MEDICINE | Age: 38
End: 2023-10-30
Attending: FAMILY MEDICINE

## 2023-10-30 VITALS
HEART RATE: 99 BPM | OXYGEN SATURATION: 99 % | HEIGHT: 64 IN | BODY MASS INDEX: 27.95 KG/M2 | DIASTOLIC BLOOD PRESSURE: 60 MMHG | RESPIRATION RATE: 18 BRPM | TEMPERATURE: 98.2 F | SYSTOLIC BLOOD PRESSURE: 110 MMHG | WEIGHT: 163.69 LBS

## 2023-10-30 DIAGNOSIS — Z13.220 LIPID SCREENING: ICD-10-CM

## 2023-10-30 DIAGNOSIS — Z12.31 ENCOUNTER FOR SCREENING MAMMOGRAM FOR MALIGNANT NEOPLASM OF BREAST: ICD-10-CM

## 2023-10-30 DIAGNOSIS — Z02.0 SCHOOL PHYSICAL EXAM: ICD-10-CM

## 2023-10-30 DIAGNOSIS — R39.15 URINARY URGENCY: ICD-10-CM

## 2023-10-30 DIAGNOSIS — Z00.00 VISIT FOR PREVENTIVE HEALTH EXAMINATION: Primary | ICD-10-CM

## 2023-10-30 DIAGNOSIS — Z86.69 HISTORY OF MIGRAINE HEADACHES: ICD-10-CM

## 2023-10-30 DIAGNOSIS — M62.89 PELVIC FLOOR DYSFUNCTION: ICD-10-CM

## 2023-10-30 PROBLEM — N94.89 PELVIC CONGESTION SYNDROME: Status: ACTIVE | Noted: 2023-10-30

## 2023-10-30 PROCEDURE — 99211 OFF/OP EST MAY X REQ PHY/QHP: CPT

## 2023-10-30 PROCEDURE — 99396 PREV VISIT EST AGE 40-64: CPT | Performed by: FAMILY MEDICINE

## 2023-10-30 RX ORDER — TOPIRAMATE 25 MG/1
TABLET ORAL
Qty: 120 TABLET | Refills: 0 | Status: SHIPPED | OUTPATIENT
Start: 2023-10-30

## 2023-10-30 ASSESSMENT — PATIENT HEALTH QUESTIONNAIRE - PHQ9
SUM OF ALL RESPONSES TO PHQ9 QUESTIONS 1 AND 2: 0
1. LITTLE INTEREST OR PLEASURE IN DOING THINGS: NOT AT ALL
CLINICAL INTERPRETATION OF PHQ2 SCORE: NO FURTHER SCREENING NEEDED
SUM OF ALL RESPONSES TO PHQ9 QUESTIONS 1 AND 2: 0
2. FEELING DOWN, DEPRESSED OR HOPELESS: NOT AT ALL

## 2023-10-31 ENCOUNTER — CLINICAL ABSTRACT (OUTPATIENT)
Dept: PHARMACY | Age: 38
End: 2023-10-31

## 2023-11-03 ENCOUNTER — LAB SERVICES (OUTPATIENT)
Dept: LAB | Age: 38
End: 2023-11-03

## 2023-11-03 DIAGNOSIS — Z02.0 SCHOOL PHYSICAL EXAM: ICD-10-CM

## 2023-11-03 DIAGNOSIS — R31.9 HEMATURIA, UNSPECIFIED TYPE: ICD-10-CM

## 2023-11-03 DIAGNOSIS — Z13.220 LIPID SCREENING: ICD-10-CM

## 2023-11-03 LAB
APPEARANCE UR: CLEAR
BILIRUB UR QL STRIP: NEGATIVE
COLOR UR: COLORLESS
GLUCOSE UR STRIP-MCNC: NEGATIVE MG/DL
HGB UR QL STRIP: NEGATIVE
KETONES UR STRIP-MCNC: NEGATIVE MG/DL
LEUKOCYTE ESTERASE UR QL STRIP: NEGATIVE
NITRITE UR QL STRIP: NEGATIVE
PH UR STRIP: 6 [PH] (ref 5–7)
PROT UR STRIP-MCNC: NEGATIVE MG/DL
SP GR UR STRIP: 1.01 (ref 1–1.03)
UROBILINOGEN UR STRIP-MCNC: 0.2 MG/DL

## 2023-11-03 PROCEDURE — 36415 COLL VENOUS BLD VENIPUNCTURE: CPT | Performed by: INTERNAL MEDICINE

## 2023-11-03 PROCEDURE — 86480 TB TEST CELL IMMUN MEASURE: CPT | Performed by: CLINICAL MEDICAL LABORATORY

## 2023-11-03 PROCEDURE — 80061 LIPID PANEL: CPT | Performed by: CLINICAL MEDICAL LABORATORY

## 2023-11-03 PROCEDURE — 81003 URINALYSIS AUTO W/O SCOPE: CPT | Performed by: CLINICAL MEDICAL LABORATORY

## 2023-11-03 PROCEDURE — 86787 VARICELLA-ZOSTER ANTIBODY: CPT | Performed by: CLINICAL MEDICAL LABORATORY

## 2023-11-04 LAB
CHOLEST SERPL-MCNC: 173 MG/DL
CHOLEST/HDLC SERPL: 2.9 {RATIO}
GAMMA INTERFERON BACKGROUND BLD IA-ACNC: 0.03 IU/ML
HDLC SERPL-MCNC: 59 MG/DL
LDLC SERPL CALC-MCNC: 99 MG/DL
M TB IFN-G BLD-IMP: NEGATIVE
M TB IFN-G CD4+ BCKGRND COR BLD-ACNC: 0.01 IU/ML
M TB IFN-G CD4+CD8+ BCKGRND COR BLD-ACNC: 0.01 IU/ML
MITOGEN IGNF BCKGRD COR BLD-ACNC: 8.64 IU/ML
NONHDLC SERPL-MCNC: 114 MG/DL
TRIGL SERPL-MCNC: 74 MG/DL

## 2023-11-06 LAB
VZV IGG SER IA-ACNC: 942.6 INDEX
VZV IGG SER QL IA: NORMAL

## 2023-11-07 ENCOUNTER — E-ADVICE (OUTPATIENT)
Dept: FAMILY MEDICINE | Age: 38
End: 2023-11-07

## 2023-11-10 ENCOUNTER — TELEPHONE (OUTPATIENT)
Dept: FAMILY MEDICINE | Age: 38
End: 2023-11-10

## 2023-11-21 ENCOUNTER — E-ADVICE (OUTPATIENT)
Dept: FAMILY MEDICINE | Age: 38
End: 2023-11-21

## 2023-12-07 ENCOUNTER — E-ADVICE (OUTPATIENT)
Dept: FAMILY MEDICINE | Age: 38
End: 2023-12-07

## 2023-12-14 ENCOUNTER — OFFICE VISIT (OUTPATIENT)
Age: 38
End: 2023-12-14
Payer: COMMERCIAL

## 2023-12-14 ENCOUNTER — HOSPITAL ENCOUNTER (OUTPATIENT)
Facility: HOSPITAL | Age: 38
Discharge: HOME | End: 2023-12-14
Payer: COMMERCIAL

## 2023-12-14 VITALS
DIASTOLIC BLOOD PRESSURE: 75 MMHG | HEART RATE: 88 BPM | HEIGHT: 64 IN | RESPIRATION RATE: 20 BRPM | BODY MASS INDEX: 31.41 KG/M2 | SYSTOLIC BLOOD PRESSURE: 112 MMHG | TEMPERATURE: 97 F | OXYGEN SATURATION: 98 % | WEIGHT: 184 LBS

## 2023-12-14 DIAGNOSIS — M79.10 MYALGIA: ICD-10-CM
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
## 2023-12-14 DIAGNOSIS — M53.3 COCCYDYNIA: ICD-10-CM
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
## 2023-12-14 PROBLEM — O46.90 VAGINAL BLEEDING DURING PREGNANCY: Status: ACTIVE | Noted: 2022-04-16

## 2023-12-14 PROBLEM — O03.9 SPONTANEOUS ABORTION: Status: ACTIVE | Noted: 2017-07-28

## 2023-12-14 PROCEDURE — 72220 X-RAY EXAM SACRUM TAILBONE: CPT

## 2023-12-14 PROCEDURE — 99213 OFFICE O/P EST LOW 20 MIN: CPT | Performed by: NURSE PRACTITIONER

## 2023-12-14 RX ORDER — PREDNISONE 10 MG/1
10 TABLET ORAL SEE ADMIN INSTRUCTIONS
Qty: 1 EACH | Refills: 0 | Status: SHIPPED | OUTPATIENT
Start: 2023-12-14

## 2023-12-14 RX ORDER — TIZANIDINE 4 MG/1
4 TABLET ORAL 3 TIMES DAILY PRN
Qty: 30 TABLET | Refills: 0 | Status: SHIPPED | OUTPATIENT
Start: 2023-12-14

## 2023-12-19 ENCOUNTER — TELEPHONE (OUTPATIENT)
Dept: FAMILY MEDICINE | Age: 38
End: 2023-12-19

## 2023-12-19 ENCOUNTER — TELEPHONE (OUTPATIENT)
Age: 38
End: 2023-12-19

## 2023-12-19 DIAGNOSIS — N39.3 SUI (STRESS URINARY INCONTINENCE, FEMALE): Primary | ICD-10-CM

## 2023-12-19 DIAGNOSIS — N94.89 PELVIC CONGESTION SYNDROME: ICD-10-CM

## 2023-12-19 NOTE — TELEPHONE ENCOUNTER
Pt calling to get x ray results and to also let provider know that she is in a lot of pain. Pt would like a call back on this matter.

## 2023-12-22 ENCOUNTER — HOSPITAL ENCOUNTER (OUTPATIENT)
Dept: MAMMOGRAPHY | Age: 38
End: 2023-12-22
Attending: FAMILY MEDICINE

## 2023-12-22 DIAGNOSIS — Z12.31 ENCOUNTER FOR SCREENING MAMMOGRAM FOR MALIGNANT NEOPLASM OF BREAST: ICD-10-CM

## 2023-12-22 PROCEDURE — 77063 BREAST TOMOSYNTHESIS BI: CPT

## 2023-12-22 PROCEDURE — 77063 BREAST TOMOSYNTHESIS BI: CPT | Performed by: RADIOLOGY

## 2023-12-22 PROCEDURE — 77067 SCR MAMMO BI INCL CAD: CPT | Performed by: RADIOLOGY

## 2024-01-03 ENCOUNTER — CLINICAL DOCUMENTATION (OUTPATIENT)
Dept: FAMILY MEDICINE | Age: 39
End: 2024-01-03

## 2024-01-11 ENCOUNTER — HOSPITAL ENCOUNTER (OUTPATIENT)
Dept: REHABILITATION | Age: 39
Discharge: STILL ADMITTED OR ADMINISTRATIVE | End: 2024-01-11
Attending: FAMILY MEDICINE

## 2024-01-11 DIAGNOSIS — N39.3 SUI (STRESS URINARY INCONTINENCE, FEMALE): ICD-10-CM

## 2024-01-11 DIAGNOSIS — N94.89 PELVIC CONGESTION SYNDROME: ICD-10-CM

## 2024-01-11 PROCEDURE — 10004173 HB COUNTER-THERAPY VISIT PT: Performed by: PHYSICAL THERAPIST

## 2024-01-11 PROCEDURE — 97162 PT EVAL MOD COMPLEX 30 MIN: CPT | Performed by: PHYSICAL THERAPIST

## 2024-01-11 PROCEDURE — 97530 THERAPEUTIC ACTIVITIES: CPT | Performed by: PHYSICAL THERAPIST

## 2024-01-11 ASSESSMENT — ENCOUNTER SYMPTOMS
SUBJECTIVE PAIN PROGRESSION: WORSENING
ALLEVIATING FACTORS: REST
QUALITY: ACHE

## 2024-01-20 ENCOUNTER — WALK IN (OUTPATIENT)
Dept: URGENT CARE | Age: 39
End: 2024-01-20

## 2024-01-20 VITALS
HEART RATE: 75 BPM | HEIGHT: 64 IN | OXYGEN SATURATION: 98 % | RESPIRATION RATE: 18 BRPM | WEIGHT: 160 LBS | TEMPERATURE: 98.8 F | SYSTOLIC BLOOD PRESSURE: 108 MMHG | BODY MASS INDEX: 27.31 KG/M2 | DIASTOLIC BLOOD PRESSURE: 80 MMHG

## 2024-01-20 DIAGNOSIS — N30.00 ACUTE CYSTITIS WITHOUT HEMATURIA: ICD-10-CM

## 2024-01-20 DIAGNOSIS — R39.9 GU (GENITOURINARY) SYMPTOMS: Primary | ICD-10-CM

## 2024-01-20 LAB
APPEARANCE, POC: ABNORMAL
BACTERIA UR CULT: NORMAL
BILIRUBIN, POC: NEGATIVE
COLOR, POC: YELLOW
GLUCOSE UR-MCNC: NEGATIVE MG/DL
KETONES, POC: NEGATIVE MG/DL
NITRITE, POC: NEGATIVE
OCCULT BLOOD, POC: ABNORMAL
PH UR: 5 [PH] (ref 5–7)
PROT UR-MCNC: NEGATIVE MG/DL
SP GR UR: 1.03 (ref 1–1.03)
UROBILINOGEN UR-MCNC: 0.2 MG/DL (ref 0–1)
WBC (LEUKOCYTE) ESTERASE, POC: ABNORMAL

## 2024-01-20 PROCEDURE — 81003 URINALYSIS AUTO W/O SCOPE: CPT | Performed by: NURSE PRACTITIONER

## 2024-01-20 PROCEDURE — 87088 URINE BACTERIA CULTURE: CPT | Performed by: CLINICAL MEDICAL LABORATORY

## 2024-01-20 PROCEDURE — 99213 OFFICE O/P EST LOW 20 MIN: CPT | Performed by: NURSE PRACTITIONER

## 2024-01-20 PROCEDURE — 87086 URINE CULTURE/COLONY COUNT: CPT | Performed by: CLINICAL MEDICAL LABORATORY

## 2024-01-20 PROCEDURE — 87186 SC STD MICRODIL/AGAR DIL: CPT | Performed by: CLINICAL MEDICAL LABORATORY

## 2024-01-20 RX ORDER — CEPHALEXIN 500 MG/1
500 CAPSULE ORAL 3 TIMES DAILY
Qty: 21 CAPSULE | Refills: 0 | Status: SHIPPED | OUTPATIENT
Start: 2024-01-20 | End: 2024-01-27

## 2024-01-22 LAB — BACTERIA UR CULT: ABNORMAL

## 2024-02-01 ENCOUNTER — HOSPITAL ENCOUNTER (OUTPATIENT)
Dept: REHABILITATION | Age: 39
Discharge: STILL ADMITTED OR ADMINISTRATIVE | End: 2024-02-01

## 2024-02-01 PROCEDURE — 97140 MANUAL THERAPY 1/> REGIONS: CPT | Performed by: PHYSICAL THERAPIST

## 2024-02-01 PROCEDURE — 10004173 HB COUNTER-THERAPY VISIT PT: Performed by: PHYSICAL THERAPIST

## 2024-02-01 PROCEDURE — 97112 NEUROMUSCULAR REEDUCATION: CPT | Performed by: PHYSICAL THERAPIST

## 2024-02-08 ENCOUNTER — HOSPITAL ENCOUNTER (OUTPATIENT)
Dept: REHABILITATION | Age: 39
Discharge: STILL ADMITTED OR ADMINISTRATIVE | End: 2024-02-08

## 2024-02-08 PROCEDURE — 10004173 HB COUNTER-THERAPY VISIT PT: Performed by: PHYSICAL THERAPIST

## 2024-02-08 PROCEDURE — 97110 THERAPEUTIC EXERCISES: CPT | Performed by: PHYSICAL THERAPIST

## 2024-02-08 PROCEDURE — 97530 THERAPEUTIC ACTIVITIES: CPT | Performed by: PHYSICAL THERAPIST

## 2024-02-08 PROCEDURE — 97140 MANUAL THERAPY 1/> REGIONS: CPT | Performed by: PHYSICAL THERAPIST

## 2024-02-08 ASSESSMENT — ENCOUNTER SYMPTOMS
PAIN SCALE AT HIGHEST: 6
PAIN SEVERITY NOW: 0
PAIN SCALE AT LOWEST: 0

## 2024-02-09 DIAGNOSIS — Z86.69 HISTORY OF MIGRAINE HEADACHES: ICD-10-CM

## 2024-02-09 RX ORDER — CEFUROXIME AXETIL 250 MG/1
TABLET ORAL
Qty: 4 ML | Refills: 3 | Status: SHIPPED | OUTPATIENT
Start: 2024-02-09

## 2024-02-13 DIAGNOSIS — Z86.69 HISTORY OF MIGRAINE HEADACHES: ICD-10-CM

## 2024-02-13 RX ORDER — CEFUROXIME AXETIL 250 MG/1
TABLET ORAL
Qty: 4 ML | Refills: 3 | OUTPATIENT
Start: 2024-02-13

## 2024-02-15 ENCOUNTER — HOSPITAL ENCOUNTER (OUTPATIENT)
Dept: REHABILITATION | Age: 39
Discharge: STILL ADMITTED OR ADMINISTRATIVE | End: 2024-02-15

## 2024-02-15 PROCEDURE — 97530 THERAPEUTIC ACTIVITIES: CPT | Performed by: PHYSICAL THERAPIST

## 2024-02-15 PROCEDURE — 97140 MANUAL THERAPY 1/> REGIONS: CPT | Performed by: PHYSICAL THERAPIST

## 2024-02-15 PROCEDURE — 97112 NEUROMUSCULAR REEDUCATION: CPT | Performed by: PHYSICAL THERAPIST

## 2024-02-15 PROCEDURE — 10004173 HB COUNTER-THERAPY VISIT PT: Performed by: PHYSICAL THERAPIST

## 2024-02-15 ASSESSMENT — ENCOUNTER SYMPTOMS
QUALITY: ACHE
PAIN SEVERITY NOW: 1
PAIN LOCATION: LOW BACK
ALLEVIATING FACTORS: REST

## 2024-02-27 ENCOUNTER — E-ADVICE (OUTPATIENT)
Dept: FAMILY MEDICINE | Age: 39
End: 2024-02-27

## 2024-03-06 ENCOUNTER — APPOINTMENT (OUTPATIENT)
Dept: REHABILITATION | Age: 39
End: 2024-03-06

## 2024-03-08 ENCOUNTER — TELEPHONE (OUTPATIENT)
Dept: FAMILY MEDICINE | Age: 39
End: 2024-03-08

## 2024-03-20 ENCOUNTER — HOSPITAL ENCOUNTER (OUTPATIENT)
Dept: REHABILITATION | Age: 39
Discharge: STILL ADMITTED OR ADMINISTRATIVE | End: 2024-03-20

## 2024-03-20 PROCEDURE — 97140 MANUAL THERAPY 1/> REGIONS: CPT | Performed by: PHYSICAL THERAPIST

## 2024-03-20 PROCEDURE — 10004173 HB COUNTER-THERAPY VISIT PT: Performed by: PHYSICAL THERAPIST

## 2024-03-20 PROCEDURE — 97110 THERAPEUTIC EXERCISES: CPT | Performed by: PHYSICAL THERAPIST

## 2024-03-20 PROCEDURE — 97530 THERAPEUTIC ACTIVITIES: CPT | Performed by: PHYSICAL THERAPIST

## 2024-04-17 ENCOUNTER — APPOINTMENT (OUTPATIENT)
Dept: REHABILITATION | Age: 39
End: 2024-04-17

## 2024-04-23 ENCOUNTER — E-ADVICE (OUTPATIENT)
Dept: FAMILY MEDICINE | Age: 39
End: 2024-04-23

## 2024-05-01 ENCOUNTER — HOSPITAL ENCOUNTER (OUTPATIENT)
Dept: REHABILITATION | Age: 39
Discharge: STILL ADMITTED OR ADMINISTRATIVE | End: 2024-05-01

## 2024-05-01 PROCEDURE — 10004173 HB COUNTER-THERAPY VISIT PT: Performed by: PHYSICAL THERAPIST

## 2024-05-01 PROCEDURE — 97530 THERAPEUTIC ACTIVITIES: CPT | Performed by: PHYSICAL THERAPIST

## 2024-05-01 PROCEDURE — 97110 THERAPEUTIC EXERCISES: CPT | Performed by: PHYSICAL THERAPIST

## 2024-05-01 PROCEDURE — 97140 MANUAL THERAPY 1/> REGIONS: CPT | Performed by: PHYSICAL THERAPIST

## 2024-05-01 ASSESSMENT — ENCOUNTER SYMPTOMS: PAIN: 1

## 2024-05-12 ENCOUNTER — TELEPHONE (OUTPATIENT)
Dept: FAMILY MEDICINE | Age: 39
End: 2024-05-12

## 2024-05-24 ENCOUNTER — E-ADVICE (OUTPATIENT)
Dept: FAMILY MEDICINE | Age: 39
End: 2024-05-24

## 2024-06-05 ENCOUNTER — HOSPITAL ENCOUNTER (OUTPATIENT)
Dept: REHABILITATION | Age: 39
Discharge: STILL ADMITTED OR ADMINISTRATIVE | End: 2024-06-05

## 2024-06-05 PROCEDURE — 97140 MANUAL THERAPY 1/> REGIONS: CPT | Performed by: PHYSICAL THERAPIST

## 2024-06-05 PROCEDURE — 97110 THERAPEUTIC EXERCISES: CPT | Performed by: PHYSICAL THERAPIST

## 2024-06-05 PROCEDURE — 97530 THERAPEUTIC ACTIVITIES: CPT | Performed by: PHYSICAL THERAPIST

## 2024-06-05 PROCEDURE — 10004173 HB COUNTER-THERAPY VISIT PT: Performed by: PHYSICAL THERAPIST

## 2024-06-05 ASSESSMENT — ENCOUNTER SYMPTOMS: PAIN: 1

## 2024-06-26 ENCOUNTER — HOSPITAL ENCOUNTER (OUTPATIENT)
Dept: REHABILITATION | Age: 39
Discharge: STILL ADMITTED OR ADMINISTRATIVE | End: 2024-06-26

## 2024-06-26 PROCEDURE — 10004173 HB COUNTER-THERAPY VISIT PT: Performed by: PHYSICAL THERAPIST

## 2024-06-26 PROCEDURE — 97530 THERAPEUTIC ACTIVITIES: CPT | Performed by: PHYSICAL THERAPIST

## 2024-06-26 PROCEDURE — 97110 THERAPEUTIC EXERCISES: CPT | Performed by: PHYSICAL THERAPIST

## 2024-06-26 ASSESSMENT — ENCOUNTER SYMPTOMS: PAIN: 1

## 2024-07-10 ENCOUNTER — APPOINTMENT (OUTPATIENT)
Dept: REHABILITATION | Age: 39
End: 2024-07-10

## 2024-07-15 ENCOUNTER — E-ADVICE (OUTPATIENT)
Dept: FAMILY MEDICINE | Age: 39
End: 2024-07-15

## 2024-07-23 ENCOUNTER — TELEPHONE (OUTPATIENT)
Dept: FAMILY MEDICINE | Age: 39
End: 2024-07-23

## 2024-07-24 ENCOUNTER — APPOINTMENT (OUTPATIENT)
Dept: REHABILITATION | Age: 39
End: 2024-07-24

## 2024-08-27 ENCOUNTER — CLINICAL ABSTRACT (OUTPATIENT)
Dept: REHABILITATION | Age: 39
End: 2024-08-27

## 2024-09-09 ENCOUNTER — E-ADVICE (OUTPATIENT)
Dept: FAMILY MEDICINE | Age: 39
End: 2024-09-09

## 2024-10-15 ENCOUNTER — E-ADVICE (OUTPATIENT)
Dept: FAMILY MEDICINE | Age: 39
End: 2024-10-15

## 2024-10-21 ENCOUNTER — E-ADVICE (OUTPATIENT)
Dept: FAMILY MEDICINE | Age: 39
End: 2024-10-21

## 2024-10-22 ENCOUNTER — E-ADVICE (OUTPATIENT)
Dept: FAMILY MEDICINE | Age: 39
End: 2024-10-22

## 2024-10-22 ENCOUNTER — TELEPHONE (OUTPATIENT)
Dept: FAMILY MEDICINE | Age: 39
End: 2024-10-22

## 2024-11-15 ENCOUNTER — TELEPHONE (OUTPATIENT)
Dept: FAMILY MEDICINE | Age: 39
End: 2024-11-15

## 2024-11-29 ENCOUNTER — TELEPHONE (OUTPATIENT)
Dept: URGENT CARE | Age: 39
End: 2024-11-29

## 2024-11-29 ENCOUNTER — WALK IN (OUTPATIENT)
Dept: URGENT CARE | Age: 39
End: 2024-11-29

## 2024-11-29 VITALS
RESPIRATION RATE: 18 BRPM | HEART RATE: 87 BPM | BODY MASS INDEX: 27.15 KG/M2 | SYSTOLIC BLOOD PRESSURE: 113 MMHG | WEIGHT: 158.18 LBS | DIASTOLIC BLOOD PRESSURE: 75 MMHG | OXYGEN SATURATION: 100 % | TEMPERATURE: 98.1 F

## 2024-11-29 DIAGNOSIS — R59.9 SWOLLEN LYMPH NODES: ICD-10-CM

## 2024-11-29 DIAGNOSIS — J02.0 STREP PHARYNGITIS: ICD-10-CM

## 2024-11-29 DIAGNOSIS — Z20.822 SUSPECTED COVID-19 VIRUS INFECTION: Primary | ICD-10-CM

## 2024-11-29 LAB
FLUAV RNA RESP QL NAA+PROBE: NOT DETECTED
FLUBV RNA RESP QL NAA+PROBE: NOT DETECTED
RSV AG NPH QL IA.RAPID: NOT DETECTED
S PYO DNA THROAT QL NAA+PROBE: DETECTED
SARS-COV-2 RNA RESP QL NAA+PROBE: NOT DETECTED
SERVICE CMNT-IMP: NORMAL
SERVICE CMNT-IMP: NORMAL

## 2024-11-29 PROCEDURE — 87651 STREP A DNA AMP PROBE: CPT | Performed by: INTERNAL MEDICINE

## 2024-11-29 RX ORDER — AMOXICILLIN 500 MG/1
500 CAPSULE ORAL 3 TIMES DAILY
Qty: 30 CAPSULE | Refills: 0 | Status: SHIPPED | OUTPATIENT
Start: 2024-11-29 | End: 2024-12-09

## 2024-12-10 ENCOUNTER — WALK IN (OUTPATIENT)
Age: 39
End: 2024-12-10

## 2024-12-10 VITALS
TEMPERATURE: 98.2 F | HEIGHT: 64 IN | DIASTOLIC BLOOD PRESSURE: 67 MMHG | BODY MASS INDEX: 27.31 KG/M2 | WEIGHT: 160 LBS | SYSTOLIC BLOOD PRESSURE: 114 MMHG | HEART RATE: 88 BPM | RESPIRATION RATE: 16 BRPM | OXYGEN SATURATION: 98 %

## 2024-12-10 DIAGNOSIS — J02.9 SORE THROAT: Primary | ICD-10-CM

## 2024-12-10 DIAGNOSIS — J01.10 ACUTE NON-RECURRENT FRONTAL SINUSITIS: ICD-10-CM

## 2024-12-10 DIAGNOSIS — U07.1 COVID-19 VIRUS DETECTED: ICD-10-CM

## 2024-12-10 LAB
FLUAV RNA RESP QL NAA+PROBE: NOT DETECTED
FLUBV RNA RESP QL NAA+PROBE: NOT DETECTED
RSV AG NPH QL IA.RAPID: NOT DETECTED
S PYO DNA THROAT QL NAA+PROBE: NOT DETECTED
SARS-COV-2 RNA RESP QL NAA+PROBE: DETECTED
TEST LOT EXPIRATION DATE: NORMAL
TEST LOT NUMBER: NORMAL

## 2024-12-10 PROCEDURE — 99213 OFFICE O/P EST LOW 20 MIN: CPT | Performed by: NURSE PRACTITIONER

## 2024-12-10 PROCEDURE — 87651 STREP A DNA AMP PROBE: CPT | Performed by: NURSE PRACTITIONER

## 2024-12-10 PROCEDURE — 0241U POCT COVID/FLU/RSV PANEL: CPT | Performed by: NURSE PRACTITIONER

## 2024-12-10 RX ORDER — DOXYCYCLINE HYCLATE 100 MG
100 TABLET ORAL 2 TIMES DAILY
Qty: 14 TABLET | Refills: 0 | Status: SHIPPED | OUTPATIENT
Start: 2024-12-10 | End: 2024-12-17

## 2024-12-10 RX ORDER — DEXTROMETHORPHAN HYDROBROMIDE AND PROMETHAZINE HYDROCHLORIDE 15; 6.25 MG/5ML; MG/5ML
5 SYRUP ORAL 4 TIMES DAILY PRN
Qty: 180 ML | Refills: 0 | Status: SHIPPED | OUTPATIENT
Start: 2024-12-10

## 2024-12-16 ENCOUNTER — OFFICE VISIT (OUTPATIENT)
Dept: FAMILY MEDICINE | Age: 39
End: 2024-12-16
Attending: FAMILY MEDICINE

## 2024-12-16 ENCOUNTER — E-ADVICE (OUTPATIENT)
Dept: FAMILY MEDICINE | Age: 39
End: 2024-12-16

## 2024-12-16 VITALS
SYSTOLIC BLOOD PRESSURE: 120 MMHG | RESPIRATION RATE: 16 BRPM | HEART RATE: 70 BPM | HEIGHT: 64 IN | TEMPERATURE: 97.7 F | OXYGEN SATURATION: 99 % | BODY MASS INDEX: 27.02 KG/M2 | DIASTOLIC BLOOD PRESSURE: 70 MMHG | WEIGHT: 158.29 LBS

## 2024-12-16 DIAGNOSIS — N93.9 ABNORMAL UTERINE BLEEDING: Primary | ICD-10-CM

## 2024-12-16 DIAGNOSIS — N94.6 DYSMENORRHEA: ICD-10-CM

## 2024-12-16 PROCEDURE — 99213 OFFICE O/P EST LOW 20 MIN: CPT | Performed by: FAMILY MEDICINE

## 2024-12-16 PROCEDURE — 99211 OFF/OP EST MAY X REQ PHY/QHP: CPT

## 2024-12-16 ASSESSMENT — PATIENT HEALTH QUESTIONNAIRE - PHQ9
SUM OF ALL RESPONSES TO PHQ9 QUESTIONS 1 AND 2: 1
CLINICAL INTERPRETATION OF PHQ2 SCORE: NO FURTHER SCREENING NEEDED
1. LITTLE INTEREST OR PLEASURE IN DOING THINGS: SEVERAL DAYS
SUM OF ALL RESPONSES TO PHQ9 QUESTIONS 1 AND 2: 1
2. FEELING DOWN, DEPRESSED OR HOPELESS: NOT AT ALL

## 2024-12-30 ENCOUNTER — HOSPITAL ENCOUNTER (OUTPATIENT)
Dept: ULTRASOUND IMAGING | Age: 39
Discharge: HOME | End: 2024-12-30
Attending: FAMILY MEDICINE

## 2024-12-30 ENCOUNTER — ANCILLARY ORDERS (OUTPATIENT)
Dept: FAMILY MEDICINE | Age: 39
End: 2024-12-30

## 2024-12-30 DIAGNOSIS — D25.9 UTERINE LEIOMYOMA, UNSPECIFIED LOCATION: ICD-10-CM

## 2024-12-30 DIAGNOSIS — N94.6 DYSMENORRHEA: ICD-10-CM

## 2024-12-30 DIAGNOSIS — N93.9 ABNORMAL UTERINE BLEEDING: Primary | ICD-10-CM

## 2024-12-30 DIAGNOSIS — N93.9 ABNORMAL UTERINE BLEEDING: ICD-10-CM

## 2024-12-30 PROCEDURE — 76830 TRANSVAGINAL US NON-OB: CPT

## 2025-01-09 ENCOUNTER — TELEPHONE (OUTPATIENT)
Dept: FAMILY MEDICINE | Age: 40
End: 2025-01-09

## 2025-01-09 ENCOUNTER — APPOINTMENT (OUTPATIENT)
Dept: FAMILY MEDICINE | Age: 40
End: 2025-01-09

## 2025-01-09 DIAGNOSIS — Z86.69 HISTORY OF MIGRAINE HEADACHES: ICD-10-CM

## 2025-01-09 RX ORDER — CEFUROXIME AXETIL 250 MG/1
TABLET ORAL
Qty: 4 ML | Refills: 1 | Status: SHIPPED | OUTPATIENT
Start: 2025-01-09

## 2025-05-05 ENCOUNTER — OFFICE VISIT (OUTPATIENT)
Age: 40
End: 2025-05-05
Payer: MEDICAID

## 2025-05-05 VITALS
TEMPERATURE: 97.9 F | BODY MASS INDEX: 28.68 KG/M2 | RESPIRATION RATE: 18 BRPM | HEART RATE: 70 BPM | WEIGHT: 168 LBS | OXYGEN SATURATION: 100 % | DIASTOLIC BLOOD PRESSURE: 76 MMHG | SYSTOLIC BLOOD PRESSURE: 113 MMHG | HEIGHT: 64 IN

## 2025-05-05 DIAGNOSIS — R73.01 ELEVATED FASTING GLUCOSE: ICD-10-CM

## 2025-05-05 DIAGNOSIS — Z00.00 ENCOUNTER FOR WELL ADULT EXAM WITHOUT ABNORMAL FINDINGS: Primary | ICD-10-CM

## 2025-05-05 DIAGNOSIS — Z23 NEED FOR TETANUS BOOSTER: ICD-10-CM

## 2025-05-05 DIAGNOSIS — Z00.00 ENCOUNTER FOR WELL ADULT EXAM WITHOUT ABNORMAL FINDINGS: ICD-10-CM

## 2025-05-05 DIAGNOSIS — K21.00 GASTROESOPHAGEAL REFLUX DISEASE WITH ESOPHAGITIS WITHOUT HEMORRHAGE: ICD-10-CM

## 2025-05-05 DIAGNOSIS — R51.9 CHRONIC DAILY HEADACHE: ICD-10-CM

## 2025-05-05 PROCEDURE — 99213 OFFICE O/P EST LOW 20 MIN: CPT | Performed by: NURSE PRACTITIONER

## 2025-05-05 PROCEDURE — 99396 PREV VISIT EST AGE 40-64: CPT | Performed by: NURSE PRACTITIONER

## 2025-05-05 SDOH — ECONOMIC STABILITY: FOOD INSECURITY: WITHIN THE PAST 12 MONTHS, THE FOOD YOU BOUGHT JUST DIDN'T LAST AND YOU DIDN'T HAVE MONEY TO GET MORE.: SOMETIMES TRUE

## 2025-05-05 SDOH — ECONOMIC STABILITY: FOOD INSECURITY: WITHIN THE PAST 12 MONTHS, YOU WORRIED THAT YOUR FOOD WOULD RUN OUT BEFORE YOU GOT MONEY TO BUY MORE.: SOMETIMES TRUE

## 2025-05-05 ASSESSMENT — PATIENT HEALTH QUESTIONNAIRE - PHQ9
2. FEELING DOWN, DEPRESSED OR HOPELESS: NOT AT ALL
SUM OF ALL RESPONSES TO PHQ QUESTIONS 1-9: 0
1. LITTLE INTEREST OR PLEASURE IN DOING THINGS: NOT AT ALL
SUM OF ALL RESPONSES TO PHQ QUESTIONS 1-9: 0

## 2025-05-05 NOTE — PROGRESS NOTES
Well Adult Note  Name: Enmanuel Shaw Today’s Date: 2025   MRN: [id number] Sex: Female   Age: 39 y.o. Ethnicity: Non- / Non    : 1985 Race: More than one Race      Enmanuel Shaw is here for a well adult exam.       Assessment & Plan   Enmanuel was seen today for annual exam.    Diagnoses and all orders for this visit:    Encounter for well adult exam without abnormal findings  -     Lipid Panel; Future  -     CBC with Partial Differential; Future  -     Comprehensive Metabolic Panel; Future    Elevated fasting glucose  -     Hemoglobin A1C; Future  -     Comprehensive Metabolic Panel; Future  Hemoglobin A1C   Date Value Ref Range Status   2023 5.3 4.0 - 5.6 % Final     Comment:     NEW METHOD  PLEASE NOTE NEW REFERENCE RANGE  (NOTE)  HbA1C Interpretive Ranges  <5.7              Normal  5.7 - 6.4         Consider Prediabetes  >6.5              Consider Diabetes         Gastroesophageal reflux disease with esophagitis without hemorrhage  -     CBC; Future  Stable/ resolved  Need for tetanus booster    Chronic daily headache  -     Cancel: External Referral To Neurology  -     [facility] - Natacha Noyola DO, Neu[facility] ([address])  Continue allergy treatment  Ibuprofen/ sleep/ hydration  Other orders  -     Tetanus-Diphth-Acell Pertussis (BOOSTRIX) 5-2.5-18.5 LF-MCG/0.5 injection; Inject 0.5 mLs into the muscle once for 1 dose            PRN       Subjective   History:  Elevated fasting glucose-    GERD     History- Concussion- rehab  .   ? Residual headache. Chronic/ neurologist.    Chronic daily headache.   Wakes with headache.    Daily/ posterior / may  be allergies.  Ibuprofen/ tylenol daily.   Sleep  8 hour.  Not working.  Kids at home    Diet well balanced  Exercise- started last week/ stretches.      Concerns today:  None- would like a referral for neurology     Care Gaps-  OB-GYN  Dr Hoffman  Mammogram - next appt   PAP- 3 yrs  Menses  heavy      Immunizations:

## 2025-05-05 NOTE — PATIENT INSTRUCTIONS
[facility] Utility - Financial Resources*  (Call [employer]/211 if need more resources.)  Utilities  [employer]Waaa  What they offer: Partnership with the [facility] of . Assist with finding and applying for government funded programs and benefits. You can also update your benefits or report changes through [employer].  Website: https://www.[employer]itan/  Phone Number: 8335CALLVA (718-424-1789)    iversityShGarena  What they offer: [employer] is [facility]'s energy assistance program of last resort for anyone who faces financial hardships from unemployment or family crisis.  Phone Number: [phone]  Address: [address] [place] [address]  Website: https://www.TournEase/[place]illing/billing-options/energyshare    Energy Assistance Programs (EAP) - [facility] of   What they offer: EAP assists low-income households in meeting their immediate home energy needs.      Website: https://www.Pinchd.[place].gov/benefit/ea/  Available assistance:   Crisis Assistance - Heating Emergencies  Fuel Assistance - Offset Heating Fuel Costs  Cooling Assistance - Applies to Cooling Utility Bills and Equipment  How to apply:  Online:  https://Je[employer] Biosciencesvirginiaviblast/  Call:  488.987.1536   Paper application:   Print application from https://www.Pinchd.[place].gov/benefit/ea/ and submit to your [facility] Department of     [facility] Department of   [facility] of  contacts: https://www.[facility].[place].[facility]/localagency/index.cgi  [place], [place] Utility Affordability Programs  https://[place].gov/public-utilities/billing  Call:  317.139.6017   Email:  katie@a.gov  Programs available:  Equal Monthly Payment Plan, [employer] Heat Program, [employer] Water Program, [employer] Water Conservation Program, Senior Assistance, Other Fuel Assistance Programs, PromisePay Payment Plans, Low-Income Household Water

## 2025-05-05 NOTE — PROGRESS NOTES
I have reviewed all needed documentation in preparation for visit. Verified patient by name and date of birth    Chief Complaint   Patient presents with    Annual Exam       Have you been to the ER, urgent care clinic since your last visit?  Hospitalized since your last visit?   NO    Have you seen or consulted any other health care providers outside our system since your last visit?   NO    Have you had a mammogram?”   NO    No breast cancer screening on file              Vitals:    05/05/25 0818   BP: 113/76   BP Site: Left Upper Arm   Patient Position: Sitting   BP Cuff Size: Medium Adult   Pulse: 70   Resp: 18   Temp: 97.9 °F (36.6 °C)   TempSrc: Temporal   SpO2: 100%   Weight: 76.2 kg (168 lb)   Height: 1.63 m (5' 4.17\")       Health Maintenance Due   Topic Date Due    Varicella vaccine (1 of 2 - 13+ 2-dose series) Never done    Hepatitis B vaccine (1 of 3 - 19+ 3-dose series) Never done    DTaP/Tdap/Td vaccine (1 - Tdap) Never done    Breast cancer screen  Never done    Depression Screen  08/28/2024    COVID-19 Vaccine (1 - 2024-25 season) Never done       Shahrzad Robbins LPN

## 2025-05-06 ENCOUNTER — RESULTS FOLLOW-UP (OUTPATIENT)
Age: 40
End: 2025-05-06

## 2025-05-06 LAB
CHOLEST SERPL-MCNC: 162 MG/DL (ref 100–199)
ERYTHROCYTE [DISTWIDTH] IN BLOOD BY AUTOMATED COUNT: 12.7 % (ref 11.7–15.4)
HBA1C MFR BLD: 5.6 % (ref 4.8–5.6)
HCT VFR BLD AUTO: 40.1 % (ref 34–46.6)
HDLC SERPL-MCNC: 48 MG/DL
HGB BLD-MCNC: 12.8 G/DL (ref 11.1–15.9)
IMP & REVIEW OF LAB RESULTS: NORMAL
LDLC SERPL CALC-MCNC: 92 MG/DL (ref 0–99)
MCH RBC QN AUTO: 26.4 PG (ref 26.6–33)
MCHC RBC AUTO-ENTMCNC: 31.9 G/DL (ref 31.5–35.7)
MCV RBC AUTO: 83 FL (ref 79–97)
PLATELET # BLD AUTO: 264 X10E3/UL (ref 150–450)
RBC # BLD AUTO: 4.84 X10E6/UL (ref 3.77–5.28)
TRIGL SERPL-MCNC: 122 MG/DL (ref 0–149)
VLDLC SERPL CALC-MCNC: 22 MG/DL (ref 5–40)
WBC # BLD AUTO: 6.6 X10E3/UL (ref 3.4–10.8)